# Patient Record
Sex: MALE | Race: WHITE | Employment: UNEMPLOYED | URBAN - NONMETROPOLITAN AREA
[De-identification: names, ages, dates, MRNs, and addresses within clinical notes are randomized per-mention and may not be internally consistent; named-entity substitution may affect disease eponyms.]

---

## 2017-12-15 LAB — PAIN MANAGEMENT DRUG PANEL INTERP, URINE: NORMAL

## 2021-02-22 LAB — AEROBIC CULTURE: ABNORMAL

## 2021-02-25 ENCOUNTER — HOSPITAL ENCOUNTER (INPATIENT)
Age: 56
LOS: 3 days | Discharge: HOME | DRG: 380 | End: 2021-02-28
Attending: INTERNAL MEDICINE | Admitting: INTERNAL MEDICINE
Payer: MEDICAID

## 2021-02-25 ENCOUNTER — APPOINTMENT (OUTPATIENT)
Dept: GENERAL RADIOLOGY | Age: 56
DRG: 380 | End: 2021-02-25
Payer: MEDICAID

## 2021-02-25 ENCOUNTER — APPOINTMENT (OUTPATIENT)
Dept: CT IMAGING | Age: 56
DRG: 380 | End: 2021-02-25
Payer: MEDICAID

## 2021-02-25 DIAGNOSIS — B35.0 TINEA CAPITIS: ICD-10-CM

## 2021-02-25 DIAGNOSIS — R41.82 ALTERED MENTAL STATUS, UNSPECIFIED ALTERED MENTAL STATUS TYPE: Primary | ICD-10-CM

## 2021-02-25 DIAGNOSIS — E11.621 DIABETIC ULCER OF LEFT MIDFOOT ASSOCIATED WITH TYPE 2 DIABETES MELLITUS, WITH FAT LAYER EXPOSED (HCC): ICD-10-CM

## 2021-02-25 DIAGNOSIS — M54.50 CHRONIC LOW BACK PAIN, UNSPECIFIED BACK PAIN LATERALITY, UNSPECIFIED WHETHER SCIATICA PRESENT: ICD-10-CM

## 2021-02-25 DIAGNOSIS — G89.29 CHRONIC LOW BACK PAIN, UNSPECIFIED BACK PAIN LATERALITY, UNSPECIFIED WHETHER SCIATICA PRESENT: ICD-10-CM

## 2021-02-25 DIAGNOSIS — L97.422 DIABETIC ULCER OF LEFT MIDFOOT ASSOCIATED WITH TYPE 2 DIABETES MELLITUS, WITH FAT LAYER EXPOSED (HCC): ICD-10-CM

## 2021-02-25 PROBLEM — G93.40 ACUTE ENCEPHALOPATHY: Status: ACTIVE | Noted: 2021-02-25

## 2021-02-25 LAB
ACETAMINOPHEN LEVEL: < 5 UG/ML (ref 0–20)
ALBUMIN SERPL-MCNC: 4.2 G/DL (ref 3.5–5.1)
ALBUMIN SERPL-MCNC: 4.4 G/DL (ref 3.5–5.1)
ALLEN TEST: POSITIVE
ALP BLD-CCNC: 87 U/L (ref 38–126)
ALP BLD-CCNC: 88 U/L (ref 38–126)
ALT SERPL-CCNC: 16 U/L (ref 11–66)
ALT SERPL-CCNC: 17 U/L (ref 11–66)
AMMONIA: 20 UMOL/L (ref 11–60)
AMMONIA: 26 UMOL/L (ref 11–60)
AMPHETAMINE+METHAMPHETAMINE URINE SCREEN: NEGATIVE
ANION GAP SERPL CALCULATED.3IONS-SCNC: 11 MEQ/L (ref 8–16)
ANION GAP SERPL CALCULATED.3IONS-SCNC: 16 MEQ/L (ref 8–16)
APTT: 30.3 SECONDS (ref 22–38)
AST SERPL-CCNC: 20 U/L (ref 5–40)
AST SERPL-CCNC: 20 U/L (ref 5–40)
AVERAGE GLUCOSE: 219 MG/DL (ref 70–126)
BARBITURATE QUANTITATIVE URINE: NEGATIVE
BASE EXCESS (CALCULATED): -2 MMOL/L (ref -2.5–2.5)
BASOPHILS # BLD: 0.4 %
BASOPHILS ABSOLUTE: 0 THOU/MM3 (ref 0–0.1)
BENZODIAZEPINE QUANTITATIVE URINE: NEGATIVE
BILIRUB SERPL-MCNC: 0.4 MG/DL (ref 0.3–1.2)
BILIRUB SERPL-MCNC: 0.5 MG/DL (ref 0.3–1.2)
BILIRUBIN DIRECT: < 0.2 MG/DL (ref 0–0.3)
BILIRUBIN URINE: NEGATIVE
BLOOD, URINE: NEGATIVE
BUN BLDV-MCNC: 14 MG/DL (ref 7–22)
BUN BLDV-MCNC: 18 MG/DL (ref 7–22)
CALCIUM SERPL-MCNC: 9.7 MG/DL (ref 8.5–10.5)
CALCIUM SERPL-MCNC: 9.8 MG/DL (ref 8.5–10.5)
CANNABINOID QUANTITATIVE URINE: NEGATIVE
CHARACTER, URINE: CLEAR
CHLORIDE BLD-SCNC: 103 MEQ/L (ref 98–111)
CHLORIDE BLD-SCNC: 107 MEQ/L (ref 98–111)
CO2: 21 MEQ/L (ref 23–33)
CO2: 25 MEQ/L (ref 23–33)
COCAINE METABOLITE QUANTITATIVE URINE: NEGATIVE
COLLECTED BY:: ABNORMAL
COLOR: YELLOW
CREAT SERPL-MCNC: 0.9 MG/DL (ref 0.4–1.2)
CREAT SERPL-MCNC: 1.1 MG/DL (ref 0.4–1.2)
DEVICE: ABNORMAL
EKG ATRIAL RATE: 84 BPM
EKG P AXIS: 62 DEGREES
EKG P-R INTERVAL: 206 MS
EKG Q-T INTERVAL: 382 MS
EKG QRS DURATION: 114 MS
EKG QTC CALCULATION (BAZETT): 451 MS
EKG R AXIS: -39 DEGREES
EKG T AXIS: 23 DEGREES
EKG VENTRICULAR RATE: 84 BPM
EOSINOPHIL # BLD: 0.8 %
EOSINOPHILS ABSOLUTE: 0.1 THOU/MM3 (ref 0–0.4)
ERYTHROCYTE [DISTWIDTH] IN BLOOD BY AUTOMATED COUNT: 13.6 % (ref 11.5–14.5)
ERYTHROCYTE [DISTWIDTH] IN BLOOD BY AUTOMATED COUNT: 13.6 % (ref 11.5–14.5)
ERYTHROCYTE [DISTWIDTH] IN BLOOD BY AUTOMATED COUNT: 43.7 FL (ref 35–45)
ERYTHROCYTE [DISTWIDTH] IN BLOOD BY AUTOMATED COUNT: 45.5 FL (ref 35–45)
ETHYL ALCOHOL, SERUM: < 0.01 %
GFR SERPL CREATININE-BSD FRML MDRD: 70 ML/MIN/1.73M2
GFR SERPL CREATININE-BSD FRML MDRD: 88 ML/MIN/1.73M2
GLUCOSE BLD-MCNC: 117 MG/DL (ref 70–108)
GLUCOSE BLD-MCNC: 131 MG/DL (ref 70–108)
GLUCOSE BLD-MCNC: 131 MG/DL (ref 70–108)
GLUCOSE BLD-MCNC: 178 MG/DL (ref 70–108)
GLUCOSE BLD-MCNC: 206 MG/DL (ref 70–108)
GLUCOSE URINE: NEGATIVE MG/DL
HBA1C MFR BLD: 9.3 % (ref 4.4–6.4)
HCO3: 24 MMOL/L (ref 23–28)
HCT VFR BLD CALC: 43.6 % (ref 42–52)
HCT VFR BLD CALC: 46 % (ref 42–52)
HEMOGLOBIN: 14.7 GM/DL (ref 14–18)
HEMOGLOBIN: 15.1 GM/DL (ref 14–18)
IMMATURE GRANS (ABS): 0.02 THOU/MM3 (ref 0–0.07)
IMMATURE GRANULOCYTES: 0.2 %
INR BLD: 0.97 (ref 0.85–1.13)
INR BLD: 0.99 (ref 0.85–1.13)
KETONES, URINE: ABNORMAL
LACTIC ACID, SEPSIS: 1.4 MMOL/L (ref 0.5–1.9)
LACTIC ACID, SEPSIS: 1.4 MMOL/L (ref 0.5–1.9)
LEUKOCYTE ESTERASE, URINE: NEGATIVE
LYMPHOCYTES # BLD: 44.6 %
LYMPHOCYTES ABSOLUTE: 4.7 THOU/MM3 (ref 1–4.8)
MCH RBC QN AUTO: 29.6 PG (ref 26–33)
MCH RBC QN AUTO: 29.7 PG (ref 26–33)
MCHC RBC AUTO-ENTMCNC: 32.8 GM/DL (ref 32.2–35.5)
MCHC RBC AUTO-ENTMCNC: 33.7 GM/DL (ref 32.2–35.5)
MCV RBC AUTO: 87.7 FL (ref 80–94)
MCV RBC AUTO: 90.4 FL (ref 80–94)
MONOCYTES # BLD: 7.1 %
MONOCYTES ABSOLUTE: 0.8 THOU/MM3 (ref 0.4–1.3)
NITRITE, URINE: NEGATIVE
NUCLEATED RED BLOOD CELLS: 0 /100 WBC
O2 SATURATION: 90 %
OPIATES, URINE: NEGATIVE
OSMOLALITY CALCULATION: 283.1 MOSMOL/KG (ref 275–300)
OSMOLALITY: 302 MOSMOL/KG (ref 275–295)
OXYCODONE: NEGATIVE
PCO2: 44 MMHG (ref 35–45)
PH BLOOD GAS: 7.35 (ref 7.35–7.45)
PH UA: 5 (ref 5–9)
PHENCYCLIDINE QUANTITATIVE URINE: NEGATIVE
PLATELET # BLD: 234 THOU/MM3 (ref 130–400)
PLATELET # BLD: 262 THOU/MM3 (ref 130–400)
PMV BLD AUTO: 9.6 FL (ref 9.4–12.4)
PMV BLD AUTO: 9.7 FL (ref 9.4–12.4)
PO2: 61 MMHG (ref 71–104)
POTASSIUM REFLEX MAGNESIUM: 3.8 MEQ/L (ref 3.5–5.2)
POTASSIUM SERPL-SCNC: 3.9 MEQ/L (ref 3.5–5.2)
PROTEIN UA: NEGATIVE
RBC # BLD: 4.97 MILL/MM3 (ref 4.7–6.1)
RBC # BLD: 5.09 MILL/MM3 (ref 4.7–6.1)
SALICYLATE, SERUM: < 0.3 MG/DL (ref 2–10)
SEG NEUTROPHILS: 46.9 %
SEGMENTED NEUTROPHILS ABSOLUTE COUNT: 5 THOU/MM3 (ref 1.8–7.7)
SODIUM BLD-SCNC: 140 MEQ/L (ref 135–145)
SODIUM BLD-SCNC: 143 MEQ/L (ref 135–145)
SOURCE, BLOOD GAS: ABNORMAL
SPECIFIC GRAVITY, URINE: 1.01 (ref 1–1.03)
T4 FREE: 1.03 NG/DL (ref 0.93–1.76)
TOTAL PROTEIN: 7.5 G/DL (ref 6.1–8)
TOTAL PROTEIN: 7.8 G/DL (ref 6.1–8)
TROPONIN T: < 0.01 NG/ML
TSH SERPL DL<=0.05 MIU/L-ACNC: 0.53 UIU/ML (ref 0.4–4.2)
UROBILINOGEN, URINE: 0.2 EU/DL (ref 0–1)
WBC # BLD: 10.6 THOU/MM3 (ref 4.8–10.8)
WBC # BLD: 11.8 THOU/MM3 (ref 4.8–10.8)

## 2021-02-25 PROCEDURE — 99223 1ST HOSP IP/OBS HIGH 75: CPT | Performed by: INTERNAL MEDICINE

## 2021-02-25 PROCEDURE — 1200000003 HC TELEMETRY R&B

## 2021-02-25 PROCEDURE — 2580000003 HC RX 258: Performed by: NURSE PRACTITIONER

## 2021-02-25 PROCEDURE — 36415 COLL VENOUS BLD VENIPUNCTURE: CPT

## 2021-02-25 PROCEDURE — 85610 PROTHROMBIN TIME: CPT

## 2021-02-25 PROCEDURE — 71045 X-RAY EXAM CHEST 1 VIEW: CPT

## 2021-02-25 PROCEDURE — 36600 WITHDRAWAL OF ARTERIAL BLOOD: CPT

## 2021-02-25 PROCEDURE — 95819 EEG AWAKE AND ASLEEP: CPT

## 2021-02-25 PROCEDURE — 80143 DRUG ASSAY ACETAMINOPHEN: CPT

## 2021-02-25 PROCEDURE — 84439 ASSAY OF FREE THYROXINE: CPT

## 2021-02-25 PROCEDURE — 85730 THROMBOPLASTIN TIME PARTIAL: CPT

## 2021-02-25 PROCEDURE — 70450 CT HEAD/BRAIN W/O DYE: CPT

## 2021-02-25 PROCEDURE — 94760 N-INVAS EAR/PLS OXIMETRY 1: CPT

## 2021-02-25 PROCEDURE — 82077 ASSAY SPEC XCP UR&BREATH IA: CPT

## 2021-02-25 PROCEDURE — 85027 COMPLETE CBC AUTOMATED: CPT

## 2021-02-25 PROCEDURE — 80053 COMPREHEN METABOLIC PANEL: CPT

## 2021-02-25 PROCEDURE — 93010 ELECTROCARDIOGRAM REPORT: CPT | Performed by: NUCLEAR MEDICINE

## 2021-02-25 PROCEDURE — 6370000000 HC RX 637 (ALT 250 FOR IP): Performed by: INTERNAL MEDICINE

## 2021-02-25 PROCEDURE — 99254 IP/OBS CNSLTJ NEW/EST MOD 60: CPT | Performed by: PSYCHIATRY & NEUROLOGY

## 2021-02-25 PROCEDURE — 6360000002 HC RX W HCPCS: Performed by: INTERNAL MEDICINE

## 2021-02-25 PROCEDURE — 82803 BLOOD GASES ANY COMBINATION: CPT

## 2021-02-25 PROCEDURE — 6360000002 HC RX W HCPCS

## 2021-02-25 PROCEDURE — 80179 DRUG ASSAY SALICYLATE: CPT

## 2021-02-25 PROCEDURE — 96374 THER/PROPH/DIAG INJ IV PUSH: CPT

## 2021-02-25 PROCEDURE — 83605 ASSAY OF LACTIC ACID: CPT

## 2021-02-25 PROCEDURE — 80307 DRUG TEST PRSMV CHEM ANLYZR: CPT

## 2021-02-25 PROCEDURE — 84443 ASSAY THYROID STIM HORMONE: CPT

## 2021-02-25 PROCEDURE — 6360000002 HC RX W HCPCS: Performed by: NURSE PRACTITIONER

## 2021-02-25 PROCEDURE — 84484 ASSAY OF TROPONIN QUANT: CPT

## 2021-02-25 PROCEDURE — 96375 TX/PRO/DX INJ NEW DRUG ADDON: CPT

## 2021-02-25 PROCEDURE — 85025 COMPLETE CBC W/AUTO DIFF WBC: CPT

## 2021-02-25 PROCEDURE — 96376 TX/PRO/DX INJ SAME DRUG ADON: CPT

## 2021-02-25 PROCEDURE — 93005 ELECTROCARDIOGRAM TRACING: CPT | Performed by: NURSE PRACTITIONER

## 2021-02-25 PROCEDURE — 96361 HYDRATE IV INFUSION ADD-ON: CPT

## 2021-02-25 PROCEDURE — 6370000000 HC RX 637 (ALT 250 FOR IP): Performed by: PSYCHIATRY & NEUROLOGY

## 2021-02-25 PROCEDURE — 83930 ASSAY OF BLOOD OSMOLALITY: CPT

## 2021-02-25 PROCEDURE — 82948 REAGENT STRIP/BLOOD GLUCOSE: CPT

## 2021-02-25 PROCEDURE — 82140 ASSAY OF AMMONIA: CPT

## 2021-02-25 PROCEDURE — 81003 URINALYSIS AUTO W/O SCOPE: CPT

## 2021-02-25 PROCEDURE — 82248 BILIRUBIN DIRECT: CPT

## 2021-02-25 PROCEDURE — 83036 HEMOGLOBIN GLYCOSYLATED A1C: CPT

## 2021-02-25 PROCEDURE — 99285 EMERGENCY DEPT VISIT HI MDM: CPT

## 2021-02-25 PROCEDURE — 95816 EEG AWAKE AND DROWSY: CPT | Performed by: PSYCHIATRY & NEUROLOGY

## 2021-02-25 RX ORDER — SODIUM CHLORIDE 0.9 % (FLUSH) 0.9 %
10 SYRINGE (ML) INJECTION PRN
Status: DISCONTINUED | OUTPATIENT
Start: 2021-02-25 | End: 2021-02-28 | Stop reason: HOSPADM

## 2021-02-25 RX ORDER — PREGABALIN 50 MG/1
100 CAPSULE ORAL 4 TIMES DAILY
Status: DISCONTINUED | OUTPATIENT
Start: 2021-02-25 | End: 2021-02-25

## 2021-02-25 RX ORDER — PREGABALIN 75 MG/1
75 CAPSULE ORAL 3 TIMES DAILY
Status: DISCONTINUED | OUTPATIENT
Start: 2021-02-25 | End: 2021-02-28 | Stop reason: HOSPADM

## 2021-02-25 RX ORDER — NICOTINE POLACRILEX 4 MG
15 LOZENGE BUCCAL PRN
Status: DISCONTINUED | OUTPATIENT
Start: 2021-02-25 | End: 2021-02-28 | Stop reason: HOSPADM

## 2021-02-25 RX ORDER — BUPRENORPHINE HYDROCHLORIDE 8 MG/1
4 TABLET SUBLINGUAL DAILY
Status: CANCELLED | OUTPATIENT
Start: 2021-02-25

## 2021-02-25 RX ORDER — DULOXETIN HYDROCHLORIDE 60 MG/1
60 CAPSULE, DELAYED RELEASE ORAL DAILY
Status: DISCONTINUED | OUTPATIENT
Start: 2021-02-25 | End: 2021-02-28 | Stop reason: HOSPADM

## 2021-02-25 RX ORDER — DULOXETIN HYDROCHLORIDE 60 MG/1
60 CAPSULE, DELAYED RELEASE ORAL DAILY
COMMUNITY
End: 2021-04-08

## 2021-02-25 RX ORDER — ACETAMINOPHEN 325 MG/1
650 TABLET ORAL EVERY 6 HOURS PRN
Status: DISCONTINUED | OUTPATIENT
Start: 2021-02-25 | End: 2021-02-28 | Stop reason: HOSPADM

## 2021-02-25 RX ORDER — PROMETHAZINE HYDROCHLORIDE 25 MG/1
12.5 TABLET ORAL EVERY 6 HOURS PRN
Status: DISCONTINUED | OUTPATIENT
Start: 2021-02-25 | End: 2021-02-28 | Stop reason: HOSPADM

## 2021-02-25 RX ORDER — NALOXONE HYDROCHLORIDE 1 MG/ML
2 INJECTION INTRAMUSCULAR; INTRAVENOUS; SUBCUTANEOUS ONCE
Status: COMPLETED | OUTPATIENT
Start: 2021-02-25 | End: 2021-02-25

## 2021-02-25 RX ORDER — ACETAMINOPHEN 650 MG/1
650 SUPPOSITORY RECTAL EVERY 6 HOURS PRN
Status: DISCONTINUED | OUTPATIENT
Start: 2021-02-25 | End: 2021-02-28 | Stop reason: HOSPADM

## 2021-02-25 RX ORDER — ONDANSETRON 2 MG/ML
4 INJECTION INTRAMUSCULAR; INTRAVENOUS EVERY 6 HOURS PRN
Status: DISCONTINUED | OUTPATIENT
Start: 2021-02-25 | End: 2021-02-28 | Stop reason: HOSPADM

## 2021-02-25 RX ORDER — GLIMEPIRIDE 4 MG/1
6 TABLET ORAL EVERY MORNING
COMMUNITY

## 2021-02-25 RX ORDER — POLYETHYLENE GLYCOL 3350 17 G/17G
17 POWDER, FOR SOLUTION ORAL DAILY PRN
Status: DISCONTINUED | OUTPATIENT
Start: 2021-02-25 | End: 2021-02-28 | Stop reason: HOSPADM

## 2021-02-25 RX ORDER — LORAZEPAM 2 MG/ML
1 INJECTION INTRAMUSCULAR ONCE
Status: COMPLETED | OUTPATIENT
Start: 2021-02-25 | End: 2021-02-25

## 2021-02-25 RX ORDER — PREGABALIN 100 MG/1
100 CAPSULE ORAL 3 TIMES DAILY
Status: ON HOLD | COMMUNITY
End: 2021-02-28 | Stop reason: SDUPTHER

## 2021-02-25 RX ORDER — 0.9 % SODIUM CHLORIDE 0.9 %
1000 INTRAVENOUS SOLUTION INTRAVENOUS ONCE
Status: COMPLETED | OUTPATIENT
Start: 2021-02-25 | End: 2021-02-25

## 2021-02-25 RX ORDER — LISINOPRIL 20 MG/1
20 TABLET ORAL 2 TIMES DAILY
Status: ON HOLD | COMMUNITY
End: 2021-02-28 | Stop reason: HOSPADM

## 2021-02-25 RX ORDER — DEXTROSE MONOHYDRATE 25 G/50ML
12.5 INJECTION, SOLUTION INTRAVENOUS PRN
Status: DISCONTINUED | OUTPATIENT
Start: 2021-02-25 | End: 2021-02-28 | Stop reason: HOSPADM

## 2021-02-25 RX ORDER — SODIUM CHLORIDE 0.9 % (FLUSH) 0.9 %
10 SYRINGE (ML) INJECTION EVERY 12 HOURS SCHEDULED
Status: DISCONTINUED | OUTPATIENT
Start: 2021-02-25 | End: 2021-02-28 | Stop reason: HOSPADM

## 2021-02-25 RX ORDER — LORAZEPAM 2 MG/ML
INJECTION INTRAMUSCULAR
Status: COMPLETED
Start: 2021-02-25 | End: 2021-02-25

## 2021-02-25 RX ORDER — FLUOXETINE HYDROCHLORIDE 20 MG/1
20 CAPSULE ORAL DAILY
Status: DISCONTINUED | OUTPATIENT
Start: 2021-02-25 | End: 2021-02-25

## 2021-02-25 RX ORDER — DULOXETIN HYDROCHLORIDE 60 MG/1
CAPSULE, DELAYED RELEASE ORAL
Status: ON HOLD | COMMUNITY
Start: 2021-02-12 | End: 2021-02-25

## 2021-02-25 RX ORDER — ASPIRIN 81 MG/1
81 TABLET, CHEWABLE ORAL DAILY
COMMUNITY

## 2021-02-25 RX ORDER — BUPRENORPHINE 10 UG/H
1 PATCH TRANSDERMAL WEEKLY
COMMUNITY
End: 2021-04-08

## 2021-02-25 RX ORDER — DEXTROSE MONOHYDRATE 50 MG/ML
100 INJECTION, SOLUTION INTRAVENOUS PRN
Status: DISCONTINUED | OUTPATIENT
Start: 2021-02-25 | End: 2021-02-28 | Stop reason: HOSPADM

## 2021-02-25 RX ADMIN — PREGABALIN 75 MG: 75 CAPSULE ORAL at 20:29

## 2021-02-25 RX ADMIN — DULOXETINE HYDROCHLORIDE 60 MG: 60 CAPSULE, DELAYED RELEASE ORAL at 20:28

## 2021-02-25 RX ADMIN — NALOXONE HYDROCHLORIDE 2 MG: 1 INJECTION PARENTERAL at 01:01

## 2021-02-25 RX ADMIN — INSULIN LISPRO 4 UNITS: 100 INJECTION, SOLUTION INTRAVENOUS; SUBCUTANEOUS at 17:23

## 2021-02-25 RX ADMIN — LORAZEPAM 1 MG: 2 INJECTION INTRAMUSCULAR; INTRAVENOUS at 05:51

## 2021-02-25 RX ADMIN — LORAZEPAM 1 MG: 2 INJECTION INTRAMUSCULAR at 02:53

## 2021-02-25 RX ADMIN — LORAZEPAM 1 MG: 2 INJECTION INTRAMUSCULAR; INTRAVENOUS at 02:53

## 2021-02-25 RX ADMIN — PREGABALIN 75 MG: 75 CAPSULE ORAL at 15:09

## 2021-02-25 RX ADMIN — ENOXAPARIN SODIUM 40 MG: 40 INJECTION SUBCUTANEOUS at 11:30

## 2021-02-25 RX ADMIN — SODIUM CHLORIDE 1000 ML: 9 INJECTION, SOLUTION INTRAVENOUS at 00:59

## 2021-02-25 NOTE — PROGRESS NOTES
66 [facility] Laboratory Technician Worksheet      EEG Date: 2021    Name: Laura Chandra   : 1965   Age: 48 y.o. SEX: male    ROOM: [facility] MRN: [id number]           CSN: [id number]      Ordering Provider: Bessie Ellington  EEG Number: [id number] Time of Test:  14:42    Hand: Right   Sedation: no    H.V. Done: No  Photic: No    Sleep: Yes  Drowsy: Yes   Sleep Deprived: No    Seizures observed: no    Mentality: alert      Clinical History:AMS, CONFUSION HX OF SUBSTANCE ABUSE; URINE TOXICOLOGY WAS NEG. HX OF SEIZURES   CT HEAD:  Impression   Impression:   1. No CT evidence of acute intracranial pathology.        Past Medical History:       Diagnosis Date    Seizures ([address]        Scheduled Meds:   sodium chloride flush  10 mL Intravenous 2 times per day    insulin lispro  0-12 Units Subcutaneous TID WC    insulin lispro  0-6 Units Subcutaneous Nightly    enoxaparin  40 mg Subcutaneous Daily    pregabalin  75 mg Oral TID    DULoxetine  60 mg Oral Daily     Continuous Infusions:   dextrose       PRN Meds:.sodium chloride flush, promethazine **OR** ondansetron, polyethylene glycol, acetaminophen **OR** acetaminophen, glucose, dextrose, glucagon (rDNA), dextrose    Technician: Adrian Galo 2021

## 2021-02-25 NOTE — ED NOTES
Pt resting on cot with lights off, eyes closed and unlabored respirations.       Rex Leach RN  02/25/21 0878

## 2021-02-25 NOTE — ED NOTES
Tele sitter placed in room at this time. Pt found at foot of bed standing again, bed alarm did not go off.       Latonia Leach RN  02/25/21 9185

## 2021-02-25 NOTE — ED NOTES
ED nurse-to-nurse bedside report    Chief Complaint   Patient presents with    Altered Mental Status      LOC: alert to only name  Vital signs   Vitals:    02/25/21 0352 02/25/21 0437 02/25/21 0538 02/25/21 0623   BP: (!) 152/91 (!) 160/96 (!) 156/84 (!) 128/104   Pulse: 74 73 84 102   Resp: 16 13 15 18   Temp:       TempSrc:       SpO2: 96% 97% 100% 97%      Pain:    Pain Interventions: ativan  Pain Goal: n/a  Oxygen: No    Current needs required room air   Telemetry: Yes  LDAs:   Peripheral IV 02/25/21 Right Antecubital (Active)   Site Assessment Clean;Dry; Intact 02/25/21 0450   Line Status Normal saline locked 02/25/21 0450   Dressing Status Dry;Clean; Intact 02/25/21 0450     Continuous Infusions:   Mobility: Requires assistance * 2  [place] Fall Risk Score: No flowsheet data found.   Fall Interventions: tele sitter, x 2 side rails  Report given to: Janelle Almodovar RN  02/25/21 2520

## 2021-02-25 NOTE — ED PROVIDER NOTES
[facility] Emergency Department    CHIEF COMPLAINT       Chief Complaint   Patient presents with    Altered Mental Status       Nurses Notes reviewed and I agree except as noted in the HPI. HISTORY OF PRESENT ILLNESS    Marti Glaser marcial 48 y.o. male who presents to the ED for evaluation of altered mental status. Patient is a resident of a sober living house. Police and EMS were called to the [facility] for an unresponsive male. Patient was intermittently unresponsive. When he was responsive he would open his eyes very wide and answer nonsensically. Airway was intact. EMS provided the HPI    REVIEW OF SYSTEMS     Review of Systems   Unable to perform ROS: Mental status change        All other systems negative except as noted. PAST MEDICAL HISTORY   History reviewed. No pertinent past medical history. SURGICALHISTORY      has no past surgical history on file. CURRENT MEDICATIONS       Previous Medications    No medications on file       ALLERGIES     has No Known Allergies. FAMILY HISTORY     has no family status information on file. family history is not on file.     SOCIAL HISTORY       Social History     Socioeconomic History    Marital status: Single     Spouse name: Not on file    Number of children: Not on file    Years of education: Not on file    Highest education level: Not on file   Occupational History    Not on file   Social Needs    Financial resource strain: Not on file    Food insecurity     Worry: Not on file     Inability: Not on file    Transportation needs     Medical: Not on file     Non-medical: Not on file   Tobacco Use    Smoking status: Unknown If Ever Smoked   Substance and Sexual Activity    Alcohol use: Not on file    Drug use: Not on file    Sexual activity: Not on file   Lifestyle    Physical activity     Days per week: Not on file     Minutes per session: Not on file    Stress: Not on file   Relationships    Social connections     Talks on phone: Not on file     Gets together: Not on file     Attends Muslim service: Not on file     Active member of club or organization: Not on file     Attends meetings of clubs or organizations: Not on file     Relationship status: Not on file    Intimate partner violence     Fear of current or ex partner: Not on file     Emotionally abused: Not on file     Physically abused: Not on file     Forced sexual activity: Not on file   Other Topics Concern    Not on file   Social History Narrative    Not on file       PHYSICAL EXAM     INITIAL VITALS:  axillary temperature is 96.6 °F (35.9 °C). His blood pressure is 156/84 (abnormal) and his pulse is 84. His respiration is 15 and oxygen saturation is 100%. Physical Exam  Vitals signs and nursing note reviewed. Constitutional:       General: He is not in acute distress. Appearance: He is well-developed and well-groomed. He is diaphoretic. He is not ill-appearing. HENT:      Head: Normocephalic and atraumatic. Mouth/Throat:      Mouth: Mucous membranes are moist.      Pharynx: Oropharynx is clear. Eyes:      Extraocular Movements: Extraocular movements intact. Right eye: Normal extraocular motion and no nystagmus. Left eye: Normal extraocular motion and no nystagmus. Pupils:      Right eye: Pupil is round and reactive. Left eye: Pupil is round and reactive. Comments: PUPILS ARE 5 AND MINIMALLY RESPONSIVE   Neck:      Musculoskeletal: Normal range of motion and neck supple. Cardiovascular:      Rate and Rhythm: Normal rate and regular rhythm. Heart sounds: Normal heart sounds. Pulmonary:      Effort: Pulmonary effort is normal.      Breath sounds: Normal breath sounds. Abdominal:      General: Bowel sounds are normal.      Palpations: Abdomen is soft. Genitourinary:     Comments: Patient was incontinent of urine  Musculoskeletal: Normal range of motion. Skin:     General: Skin is warm.       Capillary Refill: Capillary refill takes less than 2 seconds. Findings: Rash present. Neurological:      General: No focal deficit present. Mental Status: He is disoriented. Comments: Unable to perform neuro exam.   Psychiatric:      Comments: Patient is sleeping but when he does arouse he becomes agitated very easily. DIFFERENTIAL DIAGNOSIS:   Intoxication, overdose, CVA, seizure    DIAGNOSTIC RESULTS     EKG: All EKG's are interpreted by the Emergency Department Physician who eithersigns or Co-signs this chart in the absence of a cardiologist.     EKG 12 Lead (Preliminary result)   Component (Lab Inquiry)  Collection Time Result Time Ventricular Rate Atrial Rate P-R Interval QRS Duration Q-T Interval QTc Calculation (Bazett) P Axis R Axis T Axis   02/25/21 00:47:38 02/25/21 02:57:57 84 84 206 114 382 451 62 -39 23         Preliminary result                Narrative:    Normal sinus rhythm   Left axis deviation   Incomplete right bundle branch block   Abnormal ECG   No previous ECGs available                        RADIOLOGY: non-plainfilm images(s) such as CT, Ultrasound and MRI are read by the radiologist.  Plain radiographic images are visualized and preliminarily interpreted by the emergency physician unless otherwise stated below. XR CHEST PORTABLE   Final Result   Impression:   1. No acute cardiopulmonary disease. 2.  Poor inspiratory effort accentuating the heart size and pulmonary    vessels. This document has been electronically signed by: Caron Aquino MD on    02/25/2021 01:25 AM      CT HEAD WO CONTRAST (CODE STROKE)   Final Result   Impression:   1. No CT evidence of acute intracranial pathology. This document has been electronically signed by: Caron Aquino MD on    02/25/2021 12:58 AM      All CTs at this facility use dose modulation techniques and iterative    reconstructions, and/or weight-based dosing   when appropriate to reduce radiation to a low as reasonably achievable. LABS:   Labs Reviewed   BASIC METABOLIC PANEL - Abnormal; Notable for the following components:       Result Value    CO2 21 (*)     Glucose 131 (*)     All other components within normal limits   SALICYLATE LEVEL - Abnormal; Notable for the following components:    Salicylate, Serum < 0.3 (*)     All other components within normal limits   URINE RT REFLEX TO CULTURE - Abnormal; Notable for the following components:    Ketones, Urine TRACE (*)     All other components within normal limits   BLOOD GAS, ARTERIAL - Abnormal; Notable for the following components:    PO2 61 (*)     All other components within normal limits   GLOMERULAR FILTRATION RATE, ESTIMATED - Abnormal; Notable for the following components:    Est, Glom Filt Rate 70 (*)     All other components within normal limits   POCT GLUCOSE - Abnormal; Notable for the following components:    POC Glucose 131 (*)     All other components within normal limits   CBC WITH AUTO DIFFERENTIAL   ETHANOL   HEPATIC FUNCTION PANEL   ACETAMINOPHEN LEVEL   URINE DRUG SCREEN   TSH WITHOUT REFLEX   TROPONIN   PROTIME-INR   APTT   AMMONIA   ANION GAP   OSMOLALITY       EMERGENCY DEPARTMENT COURSE:   Vitals:    Vitals:    02/25/21 0307 02/25/21 0352 02/25/21 0437 02/25/21 0538   BP: (!) 167/92 (!) 152/91 (!) 160/96 (!) 156/84   Pulse: 85 74 73 84   Resp: 14 16 13 15   Temp:       TempSrc:       SpO2: 98% 96% 97% 100%          MDM             NIH Stroke Scale  Interval: Baseline  Level of Consciousness (1a. ): Not alert, requires repeated stimulation to attend  LOC Questions (1b. ): (ROSEMARY)  LOC Commands (1c. ): (ROSEMARY)  Best Gaze (2. ): (ROSEMARY)  Visual (3. ): (ROSEMARY)  Facial Palsy (4. ): (ROSEMARY)  Motor Arm, Left (5a. ): (ROSEMARY)  Motor Arm, Right (5b. ): (ROSEMARY)  Motor Leg, Left (6a. ): (ROSEMARY)  Motor Leg, Right (6b. ): (ROSEMARY)  Limb Ataxia (7. ): (ROSEMARY)  Sensory (8. ): (ROSEMARY)  Best Language (9. ): No aphasia  Dysarthria (10. ): (ROSEMARY)  Extinction and Inattention (11): (ROSEMARY) MDM    Patient seen and evaluated in the emergency room for altered mentation. Appropriate labs and imaging are ordered and reviewed. Patient is given 2 mg of Narcan without a whole lot of affect. He is hydrated with IV fluids. He is given 2 doses of Ativan secondary to his agitation. He has episodes of lucidity and then becomes confused. Sometimes he answers questions appropriately but then he will answer a couple of questions with nonsensical answers. CT of the head was negative for acute pathology. Urine is negative for infection. Overall labs are reassuring. EKG is unremarkable. At the end of my shift I am considering 2 diagnoses. One would be intoxication/overdose with a synthetic substance as his tox screen is negative. His physical exam is consistent with a toxidrome. The other consideration would be that of a seizure. He could be having an abnormal postictal phase. .  But this is not consistent with normal postictal behavior of a patient. Reviewed the case with my attending, Dr. Janet Brumfield. The decision was made to monitor the patient in the emergency room for More hours to see if he becomes more lucid. Care will be transferred to my oncoming provider Mana Whitman [place]  Medications   LORazepam (ATIVAN) injection 1 mg (has no administration in time range)   0.9 % sodium chloride bolus (0 mLs Intravenous Stopped 2/25/21 0202)   naloxone [facility]) injection 2 mg (2 mg Intravenous Given 2/25/21 0101)   LORazepam (ATIVAN) injection 1 mg (1 mg Intravenous Given 2/25/21 0253)         Patient was seen independently by myself. The patient's final impression and disposition and plan was determined by myself. Strict return precautions and follow up instructions were discussed with the patient prior to discharge, with which the patient agrees. Physical assessment findings, diagnostic testing(s) if applicable, and vital signs reviewed with patient/patient representative. Questions answered. Medications asdirected, including OTC medications for supportive care. Education provided on medications. Differential diagnosis(s) discussed with patient/patient representative. Home care/self care instructions reviewed withpatient/patient representative. Patient is to follow-up with family care provider in 2-3 days if no improvement. Patient is to go to the emergency department if symptoms worsen. Patient/patient representative isaware of care plan, questions answered, verbalizes understanding and is in agreement. CRITICAL CARE:   None    CONSULTS:      PROCEDURES:  None    FINAL IMPRESSION     1. Altered mental status, unspecified altered mental status type          DISPOSITION/PLAN   DISPOSITION Ed Observation 02/25/2021 05:49:01 AM      PATIENT REFERREDTO:  No follow-up provider specified.     DISCHARGE MEDICATIONS:  New Prescriptions    No medications on file       (Please note that portions of this note were completed with a voice recognition program.  Efforts were made to edit the dictations but occasionally words are mis-transcribed.)         AVRIL Muir CNP, APRN - CNP  02/25/21 3878

## 2021-02-25 NOTE — ED NOTES
Bed: 005A  Expected date: 2/25/21  Expected time: 12:27 AM  Means of arrival: [facility]  Comments:     Marisa Camacho RN  02/25/21 6495

## 2021-02-25 NOTE — PROGRESS NOTES
Pharmacy Medication History Note      List of current medications patient is taking is complete. Source of information: patient, outside dispense history     Changes made to medication list:  Medications removed (include reason, ex. therapy complete or physician discontinued):  · none    Medications added/doses adjusted:  · Aspirin 81 mg daily  · Cymbalta 60 mg daily   · Lyrica 100 mg- prescribed QID, pt reports 2-3 times a day   · Humalog scale   · Amaryl 6 mg daily   · Topical pain cream from Dr Charlie Lynch- [id number]-[id number]/BACLO2/DICLO3/GABA6/LIDO2/PRILO2% Grams 1-2 grams topically 3-4 times daily   · Dr Charlie Lynch prescribed Butrans 10mcg/hour patch, confirmed picked up from [facility], pt reports he never started. Other notes (ex. Recent course of antibiotics, Coumadin dosing):  · Patient reports he has not taken any of his homemed medications for the past 3 weeks due to being incarcerated. · Patient reports history of high dose fentanyl patches for 20 years, highest requirement was 250 mcg patch, slowly decreased to 75 mcg/hour patch. He reports being fired from [employer] [facility] [employer] [employer] due to alcohol use. He does not currently have a prescriber for the fentanyl patch, last filled 25 mcg/hour patch 1/3/2021 #10. · Pt reports Lantus- however no recent fill history, therefore did not add to homemed lis  · Pt reports lisinopril 20 mg BID, however last filled at [facility] 3/2020, no active prescription.         Electronically signed by Damián Liz [facility] on 2/25/2021 at 10:02 AM

## 2021-02-25 NOTE — ED NOTES
Winifred Antoine NP at bedside administering 2mg of narcan at this time.       Rosa Leach RN  02/25/21 6053

## 2021-02-25 NOTE — ED NOTES
Pt medicated per MAR. Pt tolerated well. Pt respirations unlabored. Pt very anxious at this time. Pt lights turned off for decrease in stimulation.       Joan GUERRA RN  02/25/21 0300

## 2021-02-25 NOTE — ED NOTES
Pt medicated per MAR. Pt repeating himself and stating that the year is 2020. Pt continuously asked \"what are we giving\" while medicating. Pt respirations unlabored.       Cuong Leach RN  02/25/21 3013

## 2021-02-25 NOTE — ED PROVIDER NOTES
Called to help evaluate the patient. This is a 81-year-old male with a past history of substance abuse coming in today for behavior. Patient is neurologically intact. He is able to answer my questions however he is repetitive in answering my questions. He is able to move all his extremities. Pupils are severely dilated. This looks like a toxidrome to me. Most likely methamphetamines or some sort of synthetic. Patient is awake alert and oriented. He has no alcohol registering. He is slightly tachycardic I feel that this is most likely reflective of what ever ingestion he had. EKG reveals normal sinus rhythm, left axis deviation, incomplete right bundle branch block, ventricular rate of 84 TX interval 206 QRS duration 114 QT interval 382 QTC of 451. Labs Reviewed   BASIC METABOLIC PANEL - Abnormal; Notable for the following components:       Result Value    CO2 21 (*)     Glucose 131 (*)     All other components within normal limits   SALICYLATE LEVEL - Abnormal; Notable for the following components:    Salicylate, Serum < 0.3 (*)     All other components within normal limits   BLOOD GAS, ARTERIAL - Abnormal; Notable for the following components:    PO2 61 (*)     All other components within normal limits   GLOMERULAR FILTRATION RATE, ESTIMATED - Abnormal; Notable for the following components:    Est, Glom Filt Rate 70 (*)     All other components within normal limits   POCT GLUCOSE - Abnormal; Notable for the following components:    POC Glucose 131 (*)     All other components within normal limits   CBC WITH AUTO DIFFERENTIAL   ETHANOL   HEPATIC FUNCTION PANEL   ACETAMINOPHEN LEVEL   TSH WITHOUT REFLEX   TROPONIN   PROTIME-INR   APTT   AMMONIA   ANION GAP   OSMOLALITY   URINE DRUG SCREEN   URINE RT REFLEX TO CULTURE     XR CHEST PORTABLE   Final Result   Impression:   1. No acute cardiopulmonary disease. 2.  Poor inspiratory effort accentuating the heart size and pulmonary    vessels.       This document has been electronically signed by: Osmani Abraham MD on    02/25/2021 01:25 AM      CT HEAD WO CONTRAST (CODE STROKE)   Final Result   Impression:   1. No CT evidence of acute intracranial pathology. This document has been electronically signed by: Osmani Abraham MD on    02/25/2021 12:58 AM      All CTs at this facility use dose modulation techniques and iterative    reconstructions, and/or weight-based dosing   when appropriate to reduce radiation to a low as reasonably achievable. I have seen this patient with Doyle King and agree with her assessment and plan.      Eli Sewell DO  02/25/21 7625

## 2021-02-25 NOTE — ED NOTES
Pt resting on cot with eyes closed and easy respirations. Pt lights off in room to decrease stimulation.       Louise Ta RN  02/25/21 0464

## 2021-02-25 NOTE — ED PROVIDER NOTES
Addendum. Care was assumed at 6 AM.  We did is assumed care from Conchita Horton NP and Dr. Castañeda Avers the patient continued to be altered. We did discuss the case with the hospitalist who will admit the patient.      Flo Jean [place]  02/25/21 9877

## 2021-02-25 NOTE — ED NOTES
Called [facility]K and informed LUIS that the patient is on their way to the unit. Patient transported via bed in a stable condition.      Calista Weaver  02/25/21 6060

## 2021-02-25 NOTE — H&P
hypertension, CKD, seizure disorder, reported polysubstance use disorder. Patient presented to the ER yesterday with confusion. Labs were fairly unremarkable and there is no evidence of any illicit substances in his urine. Patient would answer some questions but then would be nonsensical at other times. He was found down at a sober house. He was given IV fluids and Ativan and then admitted to the hospitalist service. Per my interview the patient is more oriented at this time. He knows he is at a hospital and he knows the month and year. He says that this has happened to him before where he presents to the hospital and is unsure why he is there. When I try to obtain further medical history the patient is a very poor historian. When I prompted him for certain illnesses he is able to elaborate some but is not able to provide much information. He acknowledges that he is a diabetic and takes insulin. He states that he does have a history of seizure disorder but then states he was never on any medication. Chart review in care everywhere demonstrates he has been admitted before for alcohol withdrawal seizures and has been having issues with chronic back pain for which she was on a Subutex patch and was following pain management. He denies any recent alcohol or illicit drug use. He does not recall why he was found down. He states that when he has a seizure is generally a general tonic-clonic seizure. He states his last seizure was a few months ago. He otherwise denies any chest pain, shortness of breath, fevers, chills, diarrhea, nausea, vomiting, headache, vision changes. PMH:  Per HPI  SHX:    Social History     Tobacco Use    Smoking status: Former Smoker     Types: Cigarettes    Smokeless tobacco: Never Used   Substance Use Topics    Alcohol use: Not Currently    Drug use: Not Currently     FHX: History reviewed. No pertinent family history.   Allergies: No Known Allergies  Medications:     dextrose        sodium chloride flush  10 mL Intravenous 2 times per day    insulin lispro  0-12 Units Subcutaneous TID     insulin lispro  0-6 Units Subcutaneous Nightly    FLUoxetine  20 mg Oral Daily    enoxaparin  40 mg Subcutaneous Daily    pregabalin  75 mg Oral TID       Vital Signs:   /86   Pulse 96   Temp 98.6 °F (37 °C) (Axillary)   Resp 18   SpO2 97% Comment: o2 not needed at this time per o2 protocol   No intake or output data in the 24 hours ending 02/25/21 1429     General:   Resting in bed  HEENT:  normocephalic and atraumatic. No scleral icterus. PERR. Dilated pupils bilaterally  Neck: supple. No JVD. No thyromegaly. Lungs: clear to auscultation. No retractions  Cardiac: RRR without murmur. Abdomen: soft. Nontender. Bowel sounds positive. Extremities:  No clubbing, cyanosis, or edema x 4. Vasculature: capillary refill < 3 seconds. Palpable LE pulses bilaterally. Skin:  warm and dry. Psych:  Alert and oriented x3. Affect appropriate. Delayed responses at times. Lymph:  No supraclavicular adenopathy. Neurologic:  No focal deficit. No seizures. Data: (All radiographs, tracings, PFTs, and imaging are personally viewed and interpreted unless otherwise noted).    Recent Results (from the past 24 hour(s))   EKG 12 Lead    Collection Time: 02/25/21 12:47 AM   Result Value Ref Range    Ventricular Rate 84 BPM    Atrial Rate 84 BPM    P-R Interval 206 ms    QRS Duration 114 ms    Q-T Interval 382 ms    QTc Calculation (Bazett) 451 ms    P Axis 62 degrees    R Axis -39 degrees    T Axis 23 degrees   POCT Glucose    Collection Time: 02/25/21 12:49 AM   Result Value Ref Range    POC Glucose 131 (H) 70 - 108 mg/dl   Basic Metabolic Panel    Collection Time: 02/25/21 12:55 AM   Result Value Ref Range    Sodium 140 135 - 145 meq/L    Potassium 3.9 3.5 - 5.2 meq/L    Chloride 103 98 - 111 meq/L    CO2 21 (L) 23 - 33 meq/L    Glucose 131 (H) 70 - 108 mg/dL    BUN 18 7 - 22 mg/dL CREATININE 1.1 0.4 - 1.2 mg/dL    Calcium 9.7 8.5 - 10.5 mg/dL   CBC Auto Differential    Collection Time: 02/25/21 12:55 AM   Result Value Ref Range    WBC 10.6 4.8 - 10.8 thou/mm3    RBC 4.97 4.70 - 6.10 mill/mm3    Hemoglobin 14.7 14.0 - 18.0 gm/dl    Hematocrit 43.6 42.0 - 52.0 %    MCV 87.7 80.0 - 94.0 fL    MCH 29.6 26.0 - 33.0 pg    MCHC 33.7 32.2 - 35.5 gm/dl    RDW-CV 13.6 11.5 - 14.5 %    RDW-SD 43.7 35.0 - 45.0 fL    Platelets 463 904 - 466 thou/mm3    MPV 9.6 9.4 - 12.4 fL    Seg Neutrophils 46.9 %    Lymphocytes 44.6 %    Monocytes 7.1 %    Eosinophils 0.8 %    Basophils 0.4 %    Immature Granulocytes 0.2 %    Segs Absolute 5.0 1.8 - 7.7 thou/mm3    Lymphocytes Absolute 4.7 1.0 - 4.8 thou/mm3    Monocytes Absolute 0.8 0.4 - 1.3 thou/mm3    Eosinophils Absolute 0.1 0.0 - 0.4 thou/mm3    Basophils Absolute 0.0 0.0 - 0.1 thou/mm3    Immature Grans (Abs) 0.02 0.00 - 0.07 thou/mm3    nRBC 0 /100 wbc   Ethanol    Collection Time: 02/25/21 12:55 AM   Result Value Ref Range    ETHYL ALCOHOL, SERUM < 0.01 0.00 %   Hepatic Function Panel    Collection Time: 02/25/21 12:55 AM   Result Value Ref Range    Albumin 4.2 3.5 - 5.1 g/dL    Total Bilirubin 0.4 0.3 - 1.2 mg/dL    Bilirubin, Direct <0.2 0.0 - 0.3 mg/dL    Alkaline Phosphatase 87 38 - 126 U/L    AST 20 5 - 40 U/L    ALT 16 11 - 66 U/L    Total Protein 7.5 6.1 - 8.0 g/dL   Acetaminophen Level    Collection Time: 02/25/21 12:55 AM   Result Value Ref Range    Acetaminophen Level < 5.0 0.0 - 13.9 ug/mL   Salicylate    Collection Time: 02/25/21 12:55 AM   Result Value Ref Range    Salicylate, Serum < 0.3 (L) 2.0 - 10.0 mg/dL   TSH without Reflex    Collection Time: 02/25/21 12:55 AM   Result Value Ref Range    TSH 0.533 0.400 - 4.200 uIU/mL   Troponin    Collection Time: 02/25/21 12:55 AM   Result Value Ref Range    Troponin T < 0.010 ng/ml   Protime-INR    Collection Time: 02/25/21 12:55 AM   Result Value Ref Range    INR 0.97 0.85 - 1.13   APTT    Collection Time: 02/25/21 12:55 AM   Result Value Ref Range    aPTT 30.3 22.0 - 38.0 seconds   Anion Gap    Collection Time: 02/25/21 12:55 AM   Result Value Ref Range    Anion Gap 16.0 8.0 - 16.0 meq/L   Osmolality    Collection Time: 02/25/21 12:55 AM   Result Value Ref Range    Osmolality Calc 283.1 275.0 - 300.0 mOsmol/kg   Glomerular Filtration Rate, Estimated    Collection Time: 02/25/21 12:55 AM   Result Value Ref Range    Est, Glom Filt Rate 70 (A) ml/min/1.73m2   Blood gas, arterial    Collection Time: 02/25/21  1:08 AM   Result Value Ref Range    pH, Blood Gas 7.35 7.35 - 7.45    PCO2 44 35 - 45 mmhg    PO2 61 (L) 71 - 104 mmhg    HCO3 24 23 - 28 mmol/l    Base Excess (Calculated) -2.0 -2.5 - 2.5 mmol/l    O2 Sat 90 %    DEVICE Room Air     Victoriano Test Positive     Source: R Radial     COLLECTED BY: [id number]    Ammonia    Collection Time: 02/25/21  1:45 AM   Result Value Ref Range    Ammonia 20 11 - 60 umol/L   Urine Drug Screen    Collection Time: 02/25/21  2:45 AM   Result Value Ref Range    AMPHETAMINE+METHAMPHETAMINE URINE SCREEN negative NEGATIVE    Barbiturate Quant, Ur negative NEGATIVE    Benzodiazepine Quant, Ur negative NEGATIVE    Cannabinoid Quant, Ur negative NEGATIVE    Cocaine Metab Quant, Ur negative NEGATIVE    Opiates, Urine negative NEGATIVE    Oxycodone negative NEGATIVE    PCP Quant, Ur negative NEGATIVE   Urinalysis Reflex to Culture    Collection Time: 02/25/21  2:45 AM    Specimen: Urine, clean catch   Result Value Ref Range    Glucose, Ur NEGATIVE NEGATIVE mg/dl    Bilirubin Urine NEGATIVE NEGATIVE    Ketones, Urine TRACE (A) NEGATIVE    Specific Gravity, Urine 1.007 1.002 - 1.030    Blood, Urine NEGATIVE NEGATIVE    pH, UA 5.0 5.0 - 9.0    Protein, UA NEGATIVE NEGATIVE    Urobilinogen, Urine 0.2 0.0 - 1.0 eu/dl    Nitrite, Urine NEGATIVE NEGATIVE    Leukocyte Esterase, Urine NEGATIVE NEGATIVE    Color, UA YELLOW STRAW-YELLOW    Character, Urine CLEAR CLEAR-SL CLOUD   CBC    Collection Time: 02/25/21  9:40 AM   Result Value Ref Range    WBC 11.8 (H) 4.8 - 10.8 thou/mm3    RBC 5.09 4.70 - 6.10 mill/mm3    Hemoglobin 15.1 14.0 - 18.0 gm/dl    Hematocrit 46.0 42.0 - 52.0 %    MCV 90.4 80.0 - 94.0 fL    MCH 29.7 26.0 - 33.0 pg    MCHC 32.8 32.2 - 35.5 gm/dl    RDW-CV 13.6 11.5 - 14.5 %    RDW-SD 45.5 (H) 35.0 - 45.0 fL    Platelets 557 270 - 044 thou/mm3    MPV 9.7 9.4 - 12.4 fL   Protime-INR    Collection Time: 02/25/21  9:40 AM   Result Value Ref Range    INR 0.99 0.85 - 1.13   Ammonia    Collection Time: 02/25/21  9:40 AM   Result Value Ref Range    Ammonia 26 11 - 60 umol/L   Osmolality, Serum    Collection Time: 02/25/21  9:40 AM   Result Value Ref Range    Osmolality 302 (H) 275 - 295 mosmol/kg   Comprehensive Metabolic Panel w/ Reflex to MG    Collection Time: 02/25/21  9:41 AM   Result Value Ref Range    Glucose 117 (H) 70 - 108 mg/dL    CREATININE 0.9 0.4 - 1.2 mg/dL    BUN 14 7 - 22 mg/dL    Sodium 143 135 - 145 meq/L    Potassium reflex Magnesium 3.8 3.5 - 5.2 meq/L    Chloride 107 98 - 111 meq/L    CO2 25 23 - 33 meq/L    Calcium 9.8 8.5 - 10.5 mg/dL    AST 20 5 - 40 U/L    Alkaline Phosphatase 88 38 - 126 U/L    Total Protein 7.8 6.1 - 8.0 g/dL    Albumin 4.4 3.5 - 5.1 g/dL    Total Bilirubin 0.5 0.3 - 1.2 mg/dL    ALT 17 11 - 66 U/L   Lactate, Sepsis    Collection Time: 02/25/21  9:41 AM   Result Value Ref Range    Lactic Acid, Sepsis 1.4 0.5 - 1.9 mmol/L   T4, Free    Collection Time: 02/25/21  9:41 AM   Result Value Ref Range    T4 Free 1.03 0.93 - 1.76 ng/dL   Hemoglobin A1c    Collection Time: 02/25/21  9:41 AM   Result Value Ref Range    Hemoglobin A1C 9.3 (H) 4.4 - 6.4 %    AVERAGE GLUCOSE 219 (H) 70 - 126 mg/dL   Anion Gap    Collection Time: 02/25/21  9:41 AM   Result Value Ref Range    Anion Gap 11.0 8.0 - 16.0 meq/L   Glomerular Filtration Rate, Estimated    Collection Time: 02/25/21  9:41 AM   Result Value Ref Range    Est, Glom Filt Rate 88 (A) ml/min/1.73m2   Lactate, Sepsis Collection Time: 02/25/21 11:30 AM   Result Value Ref Range    Lactic Acid, Sepsis 1.4 0.5 - 1.9 mmol/L         XR CHEST PORTABLE   Final Result   Impression:   1. No acute cardiopulmonary disease. 2.  Poor inspiratory effort accentuating the heart size and pulmonary    vessels. This document has been electronically signed by: Davidson Lopez MD on    02/25/2021 01:25 AM      CT HEAD WO CONTRAST (CODE STROKE)   Final Result   Impression:   1. No CT evidence of acute intracranial pathology. This document has been electronically signed by: Davidson Lopez MD on    02/25/2021 12:58 AM      All CTs at this facility use dose modulation techniques and iterative    reconstructions, and/or weight-based dosing   when appropriate to reduce radiation to a low as reasonably achievable.              Electronically signed by Nasir Blanchard MD on 2/25/2021 at 2:29 PM

## 2021-02-25 NOTE — ED NOTES
Pt rolling around in bed at this time. This RN into room multiple times to redirect pt. Bed alarm on. Pt respirations unlabored.       Cuong Leach RN  02/25/21 [id number]

## 2021-02-25 NOTE — CONSULTS
NEUROLOGY CONSULT NOTE      Requesting Physician: Sharon Jennings MD    Reason for Consult:  Evaluate for altered mental status. History of Present Illness:  Penny Davila is a 48 y.o. male admitted to [facility] on 2/25/2021.     60-year-old male with a past history of substance abuse coming in today for altered mental status. In the emergency room he was found to answer all orientation questions and moving bilateral extremities equally. He was found to have dilated pupils otherwise his urine toxicology was negative and lab work is normal.    I examined him by the bedside patient is completely back to normal.  Patient's ex-wife is present and she confirms that patient is normal now. Reports no chest pain. No shortness of breath with exertion. Reports no neck pain. No vision changes. No dysphagia. No fever. No rash. No weight loss. Past Medical History:        Diagnosis Date    Seizures ([address]        History reviewed. No pertinent surgical history.     Allergies:    No Known Allergies     Current Medications:       sodium chloride flush 0.9 % injection 10 mL, 2 times per day      sodium chloride flush 0.9 % injection 10 mL, PRN      promethazine (PHENERGAN) tablet 12.5 mg, Q6H PRN    Or      ondansetron (ZOFRAN) injection 4 mg, Q6H PRN      polyethylene glycol (GLYCOLAX) packet 17 g, Daily PRN      acetaminophen (TYLENOL) tablet 650 mg, Q6H PRN    Or      acetaminophen (TYLENOL) suppository 650 mg, Q6H PRN      glucose (GLUTOSE) 40 % oral gel 15 g, PRN      dextrose 50 % IV solution, PRN      glucagon (rDNA) injection 1 mg, PRN      dextrose 5 % solution, PRN      insulin lispro (HUMALOG) injection vial 0-12 Units, TID WC      insulin lispro (HUMALOG) injection vial 0-6 Units, Nightly      pregabalin (LYRICA) capsule 100 mg, 4x Daily      FLUoxetine (PROZAC) capsule 20 mg, Daily      enoxaparin (LOVENOX) injection 40 mg, Daily         Social History:  Social History Tobacco Use   Smoking Status Former Smoker    Types: Cigarettes   Smokeless Tobacco Never Used     Social History     Substance and Sexual Activity   Alcohol Use Not Currently     Social History     Substance and Sexual Activity   Drug Use Not Currently     Single    Family History:   History reviewed. No pertinent family history. Review of Systems:  All systems reviewed and are all negative except what is mentioned in history of present illness. Physical Exam:  /86   Pulse 96   Temp 98.6 °F (37 °C) (Axillary)   Resp 18   SpO2 100%  I There is no height or weight on file to calculate BMI. I   Wt Readings from Last 1 Encounters:   No data found for Wt           General:  pleasant in no acute distress. HEENT: No pallor or icterus. Neck supple. No Carotid bruits. Heart: S1 and S2 normal with regular rhythm. No murmur. GENERAL NEUROLOGIC EXAM     Mental Status: Awake alert and oriented to person place and time. Language is normal for comprehension, repetition, naming and fluency. Recent and remote memory were normal.  Attention span and concentration were normal. Fund of knowledge was normal.      Cranial nerves:  Fundi were normal bilaterally. Visual fields were full to confrontation. Pupils are equal regular and reactive to light. Extraocular movements are intact. Facial sensation was normal and symmetrical bilaterally. Muscles of facial expression were strong and symmetric. Hearing to finger rub normal bilaterally. Palate elevates symmetrically. Sternocleidomastoid and trapezius normal. The tongue protrudes midline without atrophy or fasciculations. Motor: Normal tone and bulk with no involuntary movements or pronator drift. Strength normal 5/5 in bilateral upper and lower extremities. Sensation: Light touch, pin prick, vibration and joint position sense were normal in the upper and lower extremities.      Reflexes: 1+ bilaterally symmetrical with down going toes.    Coordination: finger nose and heel shin test normal bilaterally. Gait: Normal station and gait. Heel, toe and tandem walk normal.  Romberg negative. Labs:    CBC:   Recent Labs     02/25/21  0055 02/25/21  0940   WBC 10.6 11.8*   HGB 14.7 15.1    262   MCV 87.7 90.4   MCH 29.6 29.7   MCHC 33.7 32.8     CMP:  Recent Labs     02/25/21  0055 02/25/21  0941    143   K 3.9 3.8    107   CO2 21* 25   BUN 18 14   CREATININE 1.1 0.9   LABGLOM 70* 88*   GLUCOSE 131* 117*   CALCIUM 9.7 9.8     Liver:   Recent Labs     02/25/21  0941   AST 20   ALT 17   ALKPHOS 88   PROT 7.8   LABALBU 4.4   BILITOT 0.5     Stroke Labs:   Recent Labs     02/25/21  0055   TSH 0.533       Imaging:  No results found for this or any previous visit. No results found for this or any previous visit. No results found for this or any previous visit. No results found for this or any previous visit. No results found for this or any previous visit. No results found for this or any previous visit. No results found for this or any previous visit. Results for orders placed during the hospital encounter of 02/25/21   CT HEAD WO CONTRAST (CODE STROKE)    Narrative   This report was discussed with Yaw Laguna on Feb 25, 2021 01:00:00   EST. This document has been electronically signed by: Jimmy Smith on   02/25/2021 01:00 AM    CT head  without IV contrast    Comparison: None    Findings:  The ventricles are normal in configuration. Basal cisterns intact. No   intracranial hemorrhage, mass-effect or midline shift. No extra-axial   fluid collections. The parenchyma is unremarkable in attenuation. Gray-white matter junction preserved. No evidence of acute large vessel   or territorial ischemia. Brainstem and cerebellum unremarkable. Suspect   choroidal cyst on the right. The calvarium is intact. The imaged portion of the paranasal are clear. No mastoid effusions.   The orbital contents are unremarkable. Impression Impression:  1. No CT evidence of acute intracranial pathology. This document has been electronically signed by: Omayra Calderon MD on   02/25/2021 12:58 AM    All CTs at this facility use dose modulation techniques and iterative   reconstructions, and/or weight-based dosing  when appropriate to reduce radiation to a low as reasonably achievable. We reviewed the patient records and available information in the EHR     Active Problems:    Acute encephalopathy  Resolved Problems:    * No resolved hospital problems. *      ASSESSMENT/PLAN: This is a 48 y.o. male who  has a past medical history of Seizures ([facility] [address] He presents with altered mental status of unknown duration. No focal deficits. CT head negative. 1.  Acute encephalopathy of unknown etiology: Questionable medication encephalopathy versus seizures    -Urinalysis negative for infection  -EEG pending  -Reduce pregabalin to 75 mg 3 times daily    If EEG is negative patient can be discharged from neurological standpoint. Thank you for allowing us to participate in the care of this patient.       Electronically signed by Divina Gale MD on 2/25/2021 at 10:54 AM

## 2021-02-25 NOTE — ED NOTES
Pt awake and talking at this time. Dr. Juan Duckworth and Kandice peter NP at bedside. Respirations unlabored.       Guero Leach RN  02/25/21 [id number]

## 2021-02-25 NOTE — ED NOTES
Pt to ED via squad from [facility] for sober living. Pt was seen around 5pm yesterday in the common area acting appropriately. The pt was then seen around 10pm strewn across his bed oddly and then began throwing objects around the area when [facility] and EMS were called. Pt is intermittently alert. Pt is arousable to his name. Pt snaps his head around quickly and smiles. Pt drooling. Pt stating that he is at home and continues to repeat \"2020\". Monitor applied. EKG complete. IV inserted.       723 Josiah Ames RN  02/25/21 [id number]

## 2021-02-26 ENCOUNTER — APPOINTMENT (OUTPATIENT)
Dept: GENERAL RADIOLOGY | Age: 56
DRG: 380 | End: 2021-02-26
Payer: MEDICAID

## 2021-02-26 LAB
ALBUMIN SERPL-MCNC: 3.8 G/DL (ref 3.5–5.1)
ALP BLD-CCNC: 75 U/L (ref 38–126)
ALT SERPL-CCNC: 14 U/L (ref 11–66)
ANION GAP SERPL CALCULATED.3IONS-SCNC: 7 MEQ/L (ref 8–16)
AST SERPL-CCNC: 14 U/L (ref 5–40)
BILIRUB SERPL-MCNC: 0.4 MG/DL (ref 0.3–1.2)
BUN BLDV-MCNC: 19 MG/DL (ref 7–22)
CALCIUM SERPL-MCNC: 9.2 MG/DL (ref 8.5–10.5)
CHLORIDE BLD-SCNC: 107 MEQ/L (ref 98–111)
CO2: 27 MEQ/L (ref 23–33)
CREAT SERPL-MCNC: 1.2 MG/DL (ref 0.4–1.2)
ERYTHROCYTE [DISTWIDTH] IN BLOOD BY AUTOMATED COUNT: 14.1 % (ref 11.5–14.5)
ERYTHROCYTE [DISTWIDTH] IN BLOOD BY AUTOMATED COUNT: 46.7 FL (ref 35–45)
GFR SERPL CREATININE-BSD FRML MDRD: 63 ML/MIN/1.73M2
GLUCOSE BLD-MCNC: 138 MG/DL (ref 70–108)
GLUCOSE BLD-MCNC: 142 MG/DL (ref 70–108)
GLUCOSE BLD-MCNC: 158 MG/DL (ref 70–108)
GLUCOSE BLD-MCNC: 353 MG/DL (ref 70–108)
GLUCOSE BLD-MCNC: 75 MG/DL (ref 70–108)
HCT VFR BLD CALC: 42.3 % (ref 42–52)
HEMOGLOBIN: 13.6 GM/DL (ref 14–18)
MCH RBC QN AUTO: 29.2 PG (ref 26–33)
MCHC RBC AUTO-ENTMCNC: 32.2 GM/DL (ref 32.2–35.5)
MCV RBC AUTO: 91 FL (ref 80–94)
PLATELET # BLD: 220 THOU/MM3 (ref 130–400)
PMV BLD AUTO: 9.7 FL (ref 9.4–12.4)
POTASSIUM REFLEX MAGNESIUM: 4.2 MEQ/L (ref 3.5–5.2)
RBC # BLD: 4.65 MILL/MM3 (ref 4.7–6.1)
SODIUM BLD-SCNC: 141 MEQ/L (ref 135–145)
TOTAL PROTEIN: 6.8 G/DL (ref 6.1–8)
WBC # BLD: 8.1 THOU/MM3 (ref 4.8–10.8)

## 2021-02-26 PROCEDURE — 36415 COLL VENOUS BLD VENIPUNCTURE: CPT

## 2021-02-26 PROCEDURE — 6370000000 HC RX 637 (ALT 250 FOR IP): Performed by: INTERNAL MEDICINE

## 2021-02-26 PROCEDURE — 87075 CULTR BACTERIA EXCEPT BLOOD: CPT

## 2021-02-26 PROCEDURE — 80053 COMPREHEN METABOLIC PANEL: CPT

## 2021-02-26 PROCEDURE — 6370000000 HC RX 637 (ALT 250 FOR IP): Performed by: FAMILY MEDICINE

## 2021-02-26 PROCEDURE — 87040 BLOOD CULTURE FOR BACTERIA: CPT

## 2021-02-26 PROCEDURE — 85027 COMPLETE CBC AUTOMATED: CPT

## 2021-02-26 PROCEDURE — 99232 SBSQ HOSP IP/OBS MODERATE 35: CPT | Performed by: PSYCHIATRY & NEUROLOGY

## 2021-02-26 PROCEDURE — 99232 SBSQ HOSP IP/OBS MODERATE 35: CPT | Performed by: FAMILY MEDICINE

## 2021-02-26 PROCEDURE — 6360000002 HC RX W HCPCS: Performed by: INTERNAL MEDICINE

## 2021-02-26 PROCEDURE — 82948 REAGENT STRIP/BLOOD GLUCOSE: CPT

## 2021-02-26 PROCEDURE — 6370000000 HC RX 637 (ALT 250 FOR IP): Performed by: PSYCHIATRY & NEUROLOGY

## 2021-02-26 PROCEDURE — 87077 CULTURE AEROBIC IDENTIFY: CPT

## 2021-02-26 PROCEDURE — 2580000003 HC RX 258: Performed by: STUDENT IN AN ORGANIZED HEALTH CARE EDUCATION/TRAINING PROGRAM

## 2021-02-26 PROCEDURE — 87147 CULTURE TYPE IMMUNOLOGIC: CPT

## 2021-02-26 PROCEDURE — 87186 SC STD MICRODIL/AGAR DIL: CPT

## 2021-02-26 PROCEDURE — 87205 SMEAR GRAM STAIN: CPT

## 2021-02-26 PROCEDURE — 87070 CULTURE OTHR SPECIMN AEROBIC: CPT

## 2021-02-26 PROCEDURE — 2580000003 HC RX 258: Performed by: INTERNAL MEDICINE

## 2021-02-26 PROCEDURE — 73630 X-RAY EXAM OF FOOT: CPT

## 2021-02-26 PROCEDURE — 1200000003 HC TELEMETRY R&B

## 2021-02-26 PROCEDURE — 6360000002 HC RX W HCPCS: Performed by: STUDENT IN AN ORGANIZED HEALTH CARE EDUCATION/TRAINING PROGRAM

## 2021-02-26 RX ORDER — LABETALOL 20 MG/4 ML (5 MG/ML) INTRAVENOUS SYRINGE
5 EVERY 4 HOURS PRN
Status: DISCONTINUED | OUTPATIENT
Start: 2021-02-26 | End: 2021-02-28 | Stop reason: HOSPADM

## 2021-02-26 RX ORDER — KETOCONAZOLE 20 MG/ML
SHAMPOO TOPICAL
Status: DISCONTINUED | OUTPATIENT
Start: 2021-02-26 | End: 2021-02-28 | Stop reason: HOSPADM

## 2021-02-26 RX ORDER — LEVETIRACETAM 500 MG/1
500 TABLET ORAL 2 TIMES DAILY
Status: DISCONTINUED | OUTPATIENT
Start: 2021-02-26 | End: 2021-02-28 | Stop reason: HOSPADM

## 2021-02-26 RX ORDER — TERBINAFINE HYDROCHLORIDE 250 MG/1
250 TABLET ORAL DAILY
Status: DISCONTINUED | OUTPATIENT
Start: 2021-02-26 | End: 2021-02-28 | Stop reason: HOSPADM

## 2021-02-26 RX ORDER — LISINOPRIL 40 MG/1
40 TABLET ORAL DAILY
Status: DISCONTINUED | OUTPATIENT
Start: 2021-02-27 | End: 2021-02-28 | Stop reason: HOSPADM

## 2021-02-26 RX ORDER — INSULIN GLARGINE 100 [IU]/ML
10 INJECTION, SOLUTION SUBCUTANEOUS
Status: DISCONTINUED | OUTPATIENT
Start: 2021-02-27 | End: 2021-02-28 | Stop reason: HOSPADM

## 2021-02-26 RX ORDER — TRAMADOL HYDROCHLORIDE 50 MG/1
50 TABLET ORAL EVERY 8 HOURS PRN
Status: DISCONTINUED | OUTPATIENT
Start: 2021-02-26 | End: 2021-02-28 | Stop reason: HOSPADM

## 2021-02-26 RX ORDER — LIDOCAINE 4 G/G
1 PATCH TOPICAL DAILY
Status: DISCONTINUED | OUTPATIENT
Start: 2021-02-26 | End: 2021-02-28 | Stop reason: HOSPADM

## 2021-02-26 RX ADMIN — PIPERACILLIN AND TAZOBACTAM 3375 MG: 3; .375 INJECTION, POWDER, LYOPHILIZED, FOR SOLUTION INTRAVENOUS at 10:50

## 2021-02-26 RX ADMIN — TERBINAFINE 250 MG: 250 TABLET ORAL at 17:43

## 2021-02-26 RX ADMIN — PREGABALIN 75 MG: 75 CAPSULE ORAL at 16:20

## 2021-02-26 RX ADMIN — INSULIN LISPRO 10 UNITS: 100 INJECTION, SOLUTION INTRAVENOUS; SUBCUTANEOUS at 16:24

## 2021-02-26 RX ADMIN — PIPERACILLIN AND TAZOBACTAM 3375 MG: 3; .375 INJECTION, POWDER, LYOPHILIZED, FOR SOLUTION INTRAVENOUS at 20:31

## 2021-02-26 RX ADMIN — LEVETIRACETAM 500 MG: 500 TABLET, FILM COATED ORAL at 20:30

## 2021-02-26 RX ADMIN — KETOCONAZOLE: 20 SHAMPOO TOPICAL at 20:32

## 2021-02-26 RX ADMIN — ENOXAPARIN SODIUM 40 MG: 40 INJECTION SUBCUTANEOUS at 10:48

## 2021-02-26 RX ADMIN — SODIUM CHLORIDE, PRESERVATIVE FREE 10 ML: 5 INJECTION INTRAVENOUS at 20:32

## 2021-02-26 RX ADMIN — TRAMADOL HYDROCHLORIDE 50 MG: 50 TABLET, FILM COATED ORAL at 16:35

## 2021-02-26 RX ADMIN — VANCOMYCIN HYDROCHLORIDE 1750 MG: 5 INJECTION, POWDER, LYOPHILIZED, FOR SOLUTION INTRAVENOUS at 13:59

## 2021-02-26 RX ADMIN — DULOXETINE HYDROCHLORIDE 60 MG: 60 CAPSULE, DELAYED RELEASE ORAL at 10:47

## 2021-02-26 RX ADMIN — SODIUM CHLORIDE, PRESERVATIVE FREE 10 ML: 5 INJECTION INTRAVENOUS at 10:49

## 2021-02-26 RX ADMIN — PREGABALIN 75 MG: 75 CAPSULE ORAL at 20:30

## 2021-02-26 RX ADMIN — LEVETIRACETAM 500 MG: 500 TABLET, FILM COATED ORAL at 13:52

## 2021-02-26 RX ADMIN — PREGABALIN 75 MG: 75 CAPSULE ORAL at 10:48

## 2021-02-26 RX ADMIN — ACETAMINOPHEN 650 MG: 325 TABLET ORAL at 16:20

## 2021-02-26 ASSESSMENT — PAIN SCALES - GENERAL
PAINLEVEL_OUTOF10: 8
PAINLEVEL_OUTOF10: 8

## 2021-02-26 NOTE — CONSULTS
Meds:   piperacillin-tazobactam  3,375 mg Intravenous Q8H    levETIRAcetam  500 mg Oral BID    lidocaine  1 patch Transdermal Daily    ketoconazole   Topical Q3 Days    terbinafine  250 mg Oral Daily    sodium chloride flush  10 mL Intravenous 2 times per day    insulin lispro  0-12 Units Subcutaneous TID WC    insulin lispro  0-6 Units Subcutaneous Nightly    enoxaparin  40 mg Subcutaneous Daily    pregabalin  75 mg Oral TID    DULoxetine  60 mg Oral Daily     Continuous Infusions:   dextrose       PRN Meds:traMADol, sodium chloride flush, promethazine **OR** ondansetron, polyethylene glycol, acetaminophen **OR** acetaminophen, glucose, dextrose, glucagon (rDNA), dextrose  Allergies:   ALLERGIES:    Patient has no known allergies. SOCIAL HISTORY:     TOBACCO:   reports that he has quit smoking. His smoking use included cigarettes. He has never used smokeless tobacco.     ETOH:   reports previous alcohol use. FAMILY HISTORY:     History reviewed. No pertinent family history. REVIEW OF SYSTEMS:     Constitutional: no fever, no night sweats, no fatigue, no weight loss. Head: no head ache , no head injury, no migranes. Eye: no eye discharge, blurring of vision, no double vision,no eye pain. Ears: no hearing difficulty, no tinnitus  Mouth/throat: no ulceration, dental caries , dysphagia, no hoarseness and voice change  Respiratory: no cough no chest pain,no shortness of breath,no wheezing  CVS: no palpitation, no chest pain,   GI: no abdominal pain, no nausea , no vomiting, no constipation,no diarrhea.   AYDEN: no dysuria, frequency and urgency, no hematuria, no kidney stones  Musculoskeletal: chronic back pain, breast as noted in HPI  Endocrine: no polyuria, polydipsia, no cold or heat intolerance  Hematology: no anemia, no easy brusing or bleeding, no hx of clotting disorder  Dermatology:+ skin rash, no skin lesions, no pruritis,  Neurological:no headaches,no dizziness, no seizure, no BC    Problem list of patient      Patient Active Problem List   Diagnosis Code    Acute encephalopathy G93.40           Impression and Recommendation:   Change in mental state: Has resolved. It may be related to his medications. Diabetes with neuropathy  Diabetic foot ulcer with cellulitis: Continue current IV antibiotic. Will transition patient to oral antibiotic  History of seizure disorder: Current change in mental state does not appear to be due to seizure. We will follow culture report and transition him to oral antibiotic. Thank you Diogo Rankin MD for allowing me to participate in this patient's care.     Hilton Homans, MD,FACP 2/26/2021 6:31 PM

## 2021-02-26 NOTE — PROCEDURES
[address] [place] [address]                          ELECTROENCEPHALOGRAM REPORT    PATIENT NAME: Glendy Morrison                   :        1965  MED REC NO:   [id number]                           ROOM:       [id number]  ACCOUNT NO:   [de-identified]                           ADMIT DATE: 2021  PROVIDER:     Ethan Moulton. Zaheer Mccormick MD    DATE OF EE2021    REFERRING PROVIDER: Brock Gilbert MD    CLINICAL HISTORY:  A 25-year-old male presenting with altered mental  status, confusion, history of seizure, substance abuse. Medications listed are insulin, Lovenox, pregabalin, duloxetine,  dextrose. This is a 17-channel EEG performed without sleep deprivation. Hyperventilation was not performed. Photic stimulation was not  performed. The patient is described as alert. Background rhythm activity is noted to be 7-8 Hz in the posterior  parietal area, symmetric, attenuates with eye opening. The patient will  be drowsy during parts of the recording. Bifrontal high-voltage delta  activity is observed intermittently during the recording that may  represent a paradoxical cortical dysfunction or indicate seizure  tendency in the proper clinical context. No clinical seizures were  observed. Lead artifact and muscle artifact were noted. IMPRESSION:  This is an abnormal EEG due to presence of bifrontal  high-voltage delta activity intermittently during the recording that may  represent a paradoxical cortical dysfunction or indicate seizure  tendency in the proper clinical context. No clinical seizures were  observed. Clinical correlation is recommended.         Lucy Don MD    D: 2021 8:49:42       T: 2021 8:59:38     [email or web address]  Job#: [id number]     Doc#: [id number]    CC:

## 2021-02-26 NOTE — PROGRESS NOTES
02/25/21 1723    insulin lispro (HUMALOG) injection vial 0-6 Units  0-6 Units Subcutaneous Nightly Catherin Lefort, MD   1 Units at 02/25/21 2105    enoxaparin (LOVENOX) injection 40 mg  40 mg Subcutaneous Daily Catherin Lefort, MD   40 mg at 02/25/21 1130    pregabalin (LYRICA) capsule 75 mg  75 mg Oral TID Duke Ashley MD   75 mg at 02/25/21 2029    DULoxetine (CYMBALTA) extended release capsule 60 mg  60 mg Oral Daily Catherin Lefort, MD   60 mg at 02/25/21 2028     No Known Allergies    PHYSICAL EXAMINATION:    Vitals:   Patient Vitals for the past 4 hrs:   BP Temp Temp src Pulse Resp SpO2   02/26/21 0430 137/85 98.1 °F (36.7 °C) Oral 88 18 95 %           General:  pleasant in no acute distress. HEENT: No pallor or icterus. Neck supple. No Carotid bruits. Heart: S1 and S2 normal with regular rhythm. No murmur. GENERAL NEUROLOGIC EXAM     Mental Status:   Awake alert and oriented to person place and time. Language is normal for comprehension and fluency. Attention span and concentration were normal.       Cranial nerves:      Pupils are equal regular and reactive to light. Muscles of facial expression were strong and symmetric. The tongue protrudes midline without atrophy or fasciculations. Motor: Normal tone and bulk with no involuntary movements or pronator drift. Strength normal 5/5 in bilateral upper and lower extremities. Sensation: Light touch, pin prick, vibration and joint position sense were normal in the upper and lower extremities. Reflexes: 1+ bilaterally symmetrical with down going toes. Coordination: finger nose and heel shin test normal bilaterally. Gait: Normal station and gait. Heel, toe and tandem walk normal.  Romberg negative.            Labs:     CBC:   Recent Labs     02/25/21  0055 02/25/21  0940 02/26/21  0451   WBC 10.6 11.8* 8.1   HGB 14.7 15.1 13.6*    262 220   MCV 87.7 90.4 91.0   MCH 29.6 29.7 29.2   MCHC 33.7 32.8 32.2     CMP:  Recent Labs     02/25/21  0055 02/25/21  0941 02/26/21  0451    143 141   K 3.9 3.8 4.2    107 107   CO2 21* 25 27   BUN 18 14 19   CREATININE 1.1 0.9 1.2   LABGLOM 70* 88* 63*   GLUCOSE 131* 117* 138*   CALCIUM 9.7 9.8 9.2     Liver:   Recent Labs     02/26/21  0451   AST 14   ALT 14   ALKPHOS 75   PROT 6.8   LABALBU 3.8   BILITOT 0.4     Stroke Labs:   Recent Labs     02/25/21  0055 02/25/21  0941   TSH 0.533  --    LABA1C  --  9.3*       Imaging:  No results found for this or any previous visit. No results found for this or any previous visit. No results found for this or any previous visit. No results found for this or any previous visit. No results found for this or any previous visit. No results found for this or any previous visit. No results found for this or any previous visit. Results for orders placed during the hospital encounter of 02/25/21   CT HEAD WO CONTRAST (CODE STROKE)    Narrative   This report was discussed with Lucero Orellana on Feb 25, 2021 01:00:00   EST. This document has been electronically signed by: Kati Burnette on   02/25/2021 01:00 AM  T head  without IV contrast    Comparison: None    Findings:  The ventricles are normal in configuration. Basal cisterns intact. No   intracranial hemorrhage, mass-effect or midline shift. No extra-axial   fluid collections. The parenchyma is unremarkable in attenuation. Gray-white matter junction preserved. No evidence of acute large vessel   or territorial ischemia. Brainstem and cerebellum unremarkable. Suspect   choroidal cyst on the right. The calvarium is intact. The imaged portion of the paranasal are clear. No mastoid effusions. The orbital contents are unremarkable. Impression Impression:  1. No CT evidence of acute intracranial pathology.     This document has been electronically signed by: Vincent Berrios MD on   02/25/2021 12:58 AM    All CTs at this facility use dose modulation techniques and iterative   reconstructions, and/or weight-based dosing  when appropriate to reduce radiation to a low as reasonably achievable. Active Problems:    Acute encephalopathy  Resolved Problems:    * No resolved hospital problems. *       ASSESSMENT/PLAN:   This is a 48 y.o. male who  has a past medical history of Seizures ([facility] [address] He presents with altered mental status of unknown duration. No focal deficits. CT head negative. -Urinalysis negative for infection     1. Acute encephalopathy of unknown etiology: Questionable medication encephalopathy versus seizures: -EEG abnormal for bifrontal high-voltage delta activity.    -Reduce pregabalin to 75 mg 3 times daily  -Start Keppra 500 twice daily     Patient can be discharged from neurological standpoint. Follow-up with Dr. Carmita Watson in 4 to 6 weeks after discharge    Thank you for allowing us to participate in the care of this patient.     Electronically signed by Alton Araya MD on 2/26/2021 at 8:00 AM

## 2021-02-26 NOTE — CARE COORDINATION
2/26/21, 8:29 AM EST  DISCHARGE PLANNING EVALUATION:    Victory Layer       Admitted: 2/25/2021/ [facility] day: 1   Location: [facility]23/023-A Reason for admit: Acute encephalopathy [G93.40]   PMH:  has a past medical history of Seizures ([facility] [address] Barriers to Discharge:  EEG completed, okay for discharge from neuro standpoint if neg. DM management. Podiatry consulted, cx taken from left foot wound. PCP: Dr. Joanna So   Readmission Risk Score: 9%    Patient Goals/Plan/Treatment Preferences: Spoke with Lindsey Tineo, he plans to return home with his wife as PTA. He follows with Dr. Dave Lester at [facility] for his foot wounds and has surgical shoes for ambulation. He denies needs for [facility] and [facility]. Transportation/Food Security/Housekeeping Addressed:  No issues identified.

## 2021-02-26 NOTE — PROGRESS NOTES
Pharmacy Note  Vancomycin Consult    Nelson Lucas is a 48 y.o. male started on Vancomycin for bilateral diabetic ulcers and cellulitis of the 5th metatarsal base; consult received from Dr. Brady Anderson to provide a one time dose. Also receiving the following antibiotics: Zosyn. Patient Active Problem List   Diagnosis    Acute encephalopathy       Allergies:  Patient has no known allergies. Temp max: 98.6    Recent Labs     02/25/21  0941 02/26/21  0451   BUN 14 19     Recent Labs     02/25/21  0941 02/26/21  0451   CREATININE 0.9 1.2     Recent Labs     02/25/21  0940 02/26/21  0451   WBC 11.8* 8.1     Culture Date Source Results   2/26/2021 Tissue culture sent                Ht Readings from Last 1 Encounters:   02/26/21 6' 1\" (1.854 m)        Wt Readings from Last 1 Encounters:   02/26/21 265 lb 12.8 oz (120.6 kg)       Body mass index is 35.07 kg/m². CrCl: 96 mL/min    Assessment/Plan:  Will give a one time dose of vancomycin 1750 mg (~15 mg/kg). Thank you for the consult.   Kathi Bustillos, PharmD, BCPS, [facility] 2/26/2021 10:48 AM

## 2021-02-26 NOTE — PROGRESS NOTES
Hospitalist Progress Note    Patient:  Christa Zamora      Unit/Bed:6K-23/023-A    YOB: 1965    MRN: [id number]       Acct: [de-identified]     PCP: Roman Rosado MD    Date of Admission: 2/25/2021    Chief Complaint: Follow-up for confusion    Hospital Course:     Per H&P note:    \"Patient is a 67-year-old male with a medical history of insulin-dependent type 2 diabetes with history of peripheral neuropathy and amputation of multiple phalanges, hypertension, CKD, seizure disorder, reported polysubstance use disorder. Patient presented to the ER yesterday with confusion. Labs were fairly unremarkable and there is no evidence of any illicit substances in his urine. Patient would answer some questions but then would be nonsensical at other times. He was found down at a sober house. He was given IV fluids and Ativan and then admitted to the hospitalist service. Per my interview the patient is more oriented at this time. He knows he is at a hospital and he knows the month and year. He says that this has happened to him before where he presents to the hospital and is unsure why he is there. When I try to obtain further medical history the patient is a very poor historian. When I prompted him for certain illnesses he is able to elaborate some but is not able to provide much information. He acknowledges that he is a diabetic and takes insulin. He states that he does have a history of seizure disorder but then states he was never on any medication. Chart review in care everywhere demonstrates he has been admitted before for alcohol withdrawal seizures and has been having issues with chronic back pain for which she was on a Subutex patch and was following pain management. He denies any recent alcohol or illicit drug use. He does not recall why he was found down. He states that when he has a seizure is generally a general tonic-clonic seizure. He states his last seizure was a few months ago. He otherwise denies any chest pain, shortness of breath, fevers, chills, diarrhea, nausea, vomiting, headache, vision changes. \"      Subjective:     Patient seen and examined. Pt denies HA, blurry vision, chest pain, sob, N/V, abd pain, diarrhea, UTI symptoms. He's c/o chronic lumbar pain x 1 year now, worsening for past 2 months per pt. Denies injury. Low back pain is constant, 8/10. He states that he had ablation and about to see neurosurgeon in OP. He reports that he has B/L feet swelling for past few months, has been worsening for past few weeks and had multiple toe amputation. Pt's wife and son at bedside      Medications:  Reviewed    Infusion Medications    dextrose       Scheduled Medications    piperacillin-tazobactam  3,375 mg Intravenous Q8H    levETIRAcetam  500 mg Oral BID    vancomycin  1,750 mg Intravenous Once    lidocaine  1 patch Transdermal Daily    ketoconazole   Topical Q3 Days    terbinafine  250 mg Oral Daily    sodium chloride flush  10 mL Intravenous 2 times per day    insulin lispro  0-12 Units Subcutaneous TID WC    insulin lispro  0-6 Units Subcutaneous Nightly    enoxaparin  40 mg Subcutaneous Daily    pregabalin  75 mg Oral TID    DULoxetine  60 mg Oral Daily     PRN Meds: sodium chloride flush, promethazine **OR** ondansetron, polyethylene glycol, acetaminophen **OR** acetaminophen, glucose, dextrose, glucagon (rDNA), dextrose    No intake or output data in the 24 hours ending 02/26/21 1254    Diet:  DIET CARB CONTROL; Exam:  BP (!) 142/86   Pulse 84   Temp 97.9 °F (36.6 °C)   Resp 18   Ht 6' 1\" (1.854 m)   Wt 265 lb 12.8 oz (120.6 kg)   SpO2 96%   BMI 35.07 kg/m²     General appearance: alert, not in acute distress   HEENT: Pupils equal, round, and reactive to light. Conjunctivae clear. Neck: Supple, with full range of motion. No jugular venous distention. Trachea midline. Respiratory:  Normal respiratory effort.  Clear to auscultation, bilaterally without Rales/Wheezes/Rhonchi. Cardiovascular: normal rate, regular rhythm with normal S1/S2 without murmurs, rubs or gallops. Abdomen: Soft, non-tender, non-distended with normal bowel sounds. Musculoskeletal: passive and active ROM x 4 extremities. (+) lumbar tenderness, (+) B/L straight leg test  Skin: (+) scattered scaly, erythematous/pinkish on base rashes on the scalp  Neurological exam reveals alert, oriented, normal speech, no focal findings or movement disorder noted. Exam of extremities: feet wrapped with ace wrap      Labs:   Recent Labs     02/25/21 0055 02/25/21  0940 02/26/21  0451   WBC 10.6 11.8* 8.1   HGB 14.7 15.1 13.6*   HCT 43.6 46.0 42.3    262 220     Recent Labs     02/25/21 0055 02/25/21  0941 02/26/21  0451    143 141   K 3.9 3.8 4.2    107 107   CO2 21* 25 27   BUN 18 14 19   CREATININE 1.1 0.9 1.2   CALCIUM 9.7 9.8 9.2     Recent Labs     02/25/21 0055 02/25/21  0941 02/26/21  0451   AST 20 20 14   ALT 16 17 14   BILIDIR <0.2  --   --    BILITOT 0.4 0.5 0.4   ALKPHOS 87 88 75     Recent Labs     02/25/21 0055 02/25/21  0940   INR 0.97 0.99     No results for input(s): Sarah Mcginnis in the last 72 hours. Urinalysis:      Lab Results   Component Value Date    NITRU NEGATIVE 02/25/2021    BLOODU NEGATIVE 02/25/2021    GLUCOSEU NEGATIVE 02/25/2021       Radiology:  XR FOOT LEFT (MIN 3 VIEWS)   Final Result    Amputation of the fifth digit with heterotopic ossification without definite evidence of osteomyelitis to limits of the sensitivity of this examination. **This report has been created using voice recognition software. It may contain minor errors which are inherent in voice recognition technology. **      Final report electronically signed by Dr. Yas Aguilar MD on 2/26/2021 10:40 AM      XR CHEST PORTABLE   Final Result   Impression:   1. No acute cardiopulmonary disease.    2.  Poor inspiratory effort accentuating the heart size and pulmonary    vessels. This document has been electronically signed by: Rustam Villar MD on    02/25/2021 01:25 AM      CT HEAD WO CONTRAST (CODE STROKE)   Final Result   Impression:   1. No CT evidence of acute intracranial pathology. This document has been electronically signed by: Rustam Villar MD on    02/25/2021 12:58 AM      All CTs at this facility use dose modulation techniques and iterative    reconstructions, and/or weight-based dosing   when appropriate to reduce radiation to a low as reasonably achievable. Diet: DIET CARB CONTROL; Assessment/Plan:      Acute encephalopathy, exact etiology unclear, questionable medication-induced vs seizure vs infection (diabetic foot ulcer)    -per neurology note, EEG is abnormal for bifrontal high-voltage delta activity  -UA (-) for infection  -UDS (-)   -neurology started keppra 500 mg PO BID. Appreciate neurology input  -pregabalin dose was reduced to 75 mg PO TID  -please see below for DM foot ulcer management     Left 5th metatarsal base cellulitis  DM foot ulcer, B/L     -soft tissue gram stain showed many gram positive cocci occurring singly and in pairs. Culture pending  -blood cxs x 2 pending  -on IV vancomycin and zosyn  -ID and Podiatry on-board. Appreciate specialists input      Seizure    -abn EEG as stated above  -neurology started Keppra. Neurology recommend to follow-up with Dr. Carlos A Menjivar in 4-6 weeks after discharge.  Appreciate Neurology input.   -patient was advised no driving until cleared by neurologist.    DM type 2, uncontrolled    -A1C 9.3% on 2/25/2021  -cont SSI, low carb diet, accu-check  -start lantus     DM peripheral neuropathy    -cont lyrica and cymbalta     Essential HTN, uncontrolled    -resume home meds lisinopril with holding parameters  -labetaol IV prn  -VS per protocol     Chronic low back pain    -start lidocaine patch  -cont tylenol  -tramadol prn for severe pain      Tinea capitis    -start ketoconazole shampoo x 2 weeks and lamisil PO x 6 weeks.  Monitor LFTs daily    Hx of ADONIS    -per care everywhere, pt had creatinine as high as 6 in the past, otherwise, eGFR looks normal most of the time   -BMP in am      Leukocytosis, likely from DM foot ulcer, resolved    -CBC in am    Hx of polysubstance abuse    -UDS (-)      Anticipated Discharge in : pending           DVT prophylaxis: [] Lovenox                                 [] SCDs                                 [] SQ Heparin                                 [] Encourage ambulation           [] Already on Anticoagulation     Disposition:    [] Home       [] TCU       [] Rehab       [] Psych       [] SNF       [] [place]       [x] Other-Plan as above       Code Status: Full Code        Electronically signed by Corinne Blanton MD on 2/26/2021 at 12:54 PM

## 2021-02-26 NOTE — PROGRESS NOTES
Physician Progress Note      Marie Barbosa  HECTOR #:                  [id number]  :                       1965  ADMIT DATE:       2021 12:33 AM  DISCH DATE:  RESPONDING  PROVIDER #:        Efren Ventura MD        QUERY TEXT:    Stage of Chronic Kidney Disease: Please provide further specificity, if known. Clinical indicators include: ckd, creatinines, creatinine, bun  Options provided:  -- Chronic kidney disease stage 1  -- Chronic kidney disease stage 2  -- Chronic kidney disease stage 3  -- Chronic kidney disease stage 4  -- Chronic kidney disease stage 5  -- Chronic kidney disease stage 5, requiring dialysis  -- End stage renal disease  -- Other - I will add my own diagnosis  -- Disagree - Not applicable / Not valid  -- Disagree - Clinically Unable to determine / Unknown        PROVIDER RESPONSE TEXT:    The patient has chronic kidney disease stage 3.       Electronically signed by:  Efren Ventura MD 2021 12:14 PM

## 2021-02-26 NOTE — CONSULTS
Podiatric Surgery Consult    Reason for Consult:  Bilateral foot wounds  Requesting Physician:  Marcie Holder MD    CHIEF COMPLAINT:  Bilateral foot wound    HISTORY OF PRESENT ILLNESS:                The patient is a 48 y.o. male with significant past medical history of seizures, Type 2 DM who being seen at bedside with complaints of bilateral foot wounds. Patient has been dealing with these foot wounds for a long time. He has previously had a left partial 5th ray resection, right partial 1st ray amputation, and 4th partial digital amputation right foot. Patient relates that 1 month ago he began having increased pain, swelling, and purulent discharge to his left foot 5th ray stump. The incision was closed, however it re-opened. Patient followed up with Dr. Joanna Maier for this condition. Patient had a culture taken, was prescribed a course of doxycycline, and has wrapped the foot since then. Patient states that he finished his course of doxy. However, he still has residual pain and redness to this foot. Patient also relates that he has recently been dealing with a right foot ulcer, but he is more concerned about the left. He does not want to lose his feet. Patient denies any current nausea, vomiting, fever, chills, calf pain, chest pain, or shortness of breath. No other pedal complaints. Past Medical History:        Diagnosis Date    Seizures [facility])      Past Surgical History:    History reviewed. No pertinent surgical history.   Current Medications:    Current Facility-Administered Medications: sodium chloride flush 0.9 % injection 10 mL, 10 mL, Intravenous, 2 times per day  sodium chloride flush 0.9 % injection 10 mL, 10 mL, Intravenous, PRN  promethazine (PHENERGAN) tablet 12.5 mg, 12.5 mg, Oral, Q6H PRN **OR** ondansetron (ZOFRAN) injection 4 mg, 4 mg, Intravenous, Q6H PRN  polyethylene glycol (GLYCOLAX) packet 17 g, 17 g, Oral, Daily PRN  acetaminophen (TYLENOL) tablet 650 mg, 650 mg, Oral, Q6H PRN **OR** acetaminophen (TYLENOL) suppository 650 mg, 650 mg, Rectal, Q6H PRN  glucose (GLUTOSE) 40 % oral gel 15 g, 15 g, Oral, PRN  dextrose 50 % IV solution, 12.5 g, Intravenous, PRN  glucagon (rDNA) injection 1 mg, 1 mg, Intramuscular, PRN  dextrose 5 % solution, 100 mL/hr, Intravenous, PRN  insulin lispro (HUMALOG) injection vial 0-12 Units, 0-12 Units, Subcutaneous, TID WC  insulin lispro (HUMALOG) injection vial 0-6 Units, 0-6 Units, Subcutaneous, Nightly  enoxaparin (LOVENOX) injection 40 mg, 40 mg, Subcutaneous, Daily  pregabalin (LYRICA) capsule 75 mg, 75 mg, Oral, TID  DULoxetine (CYMBALTA) extended release capsule 60 mg, 60 mg, Oral, Daily  Allergies:  Patient has no known allergies. Social History:    TOBACCO:   reports that he has quit smoking. His smoking use included cigarettes. He has never used smokeless tobacco.  ETOH:   reports previous alcohol use. DRUGS:   reports previous drug use. Family History:   History reviewed. No pertinent family history. REVIEW OF SYSTEMS:    Pertinent ROS in HPI  PHYSICAL EXAM:      Vitals:    BP (!) 142/86   Pulse 84   Temp 97.9 °F (36.6 °C)   Resp 18   SpO2 96%     Exam:   Dressing intact and well maintained. No apparent strike through noted. Vascular: Dorsalis pedis and posterior tibial pulses are palpable bilaterally. Skin temperature is warm to warm from proximal tibial tuberosity to distal digits. CFT < 3 seconds to exposed digits. Edema positive over 5th metatarsal base left foot. Hair growth negative. Quality of skin diminished. Dermatologic: Patient has wounds to the bilateral feet. On left, patient has a wound at the stump site of his partial 5th ray resection. Wounds has inflammatory cyst present, and probes to bone. No purulence visualized. Some questionable malodor vs. poor pedal hygiene present. Erythema and edema present in rakesh-wound area. Hyperkeratotic wound margins. On right, patient has an ulceration sub-4th metatarsal head.  Wound is partial thickness, with granular base. Sanguinous discharge present. No surrounding erythema, edema. No purulence or malodor. Neurovascular: Protective sensation grossly diminished bilaterally. Light touch sensation diminished to level of Achilles tendon bilaterally. Musculoskeletal: Muscle strength 5/5 for all plantarflexors, dorsiflexors, and inverters. Muscle strength 4/5 for everters examined. Pain on palpation of wounds bilaterally. S/p partial 5th ray resection left, partial 1st ray resection right, partial 4th digital amputation right. Left foot      Dorsal left foot      Plantar right foot          XRAY: Pending left foot xray    LABS:    Recent Labs     02/25/21  0940 02/26/21  0451   WBC 11.8* 8.1   HGB 15.1 13.6*   HCT 46.0 42.3    220        Recent Labs     02/26/21  0451      K 4.2      CO2 27   BUN 19   CREATININE 1.2        Recent Labs     02/25/21  0055 02/25/21  0940 02/25/21  0941 02/26/21  0451   PROT 7.5  --  7.8 6.8   INR 0.97 0.99  --   --    APTT 30.3  --   --   --       No results for input(s): CKTOTAL, CKMB, CKMBINDEX, TROPONINI in the last 72 hours. Assessment  Principle  1) Cellulitis, left 5th metatarsal base  2) Diabetic foot ulcerations, bilateral    Plan  - Patient initially examined and evaluated  - WBC 8.1, afebrile  - Labs reviewed, last A1c 9.3%  - Culture taken from left 5th metatarsal wound, sent for aerobic/anaerobic microbiologic analysis  - Xrays ordered of left foot  - Clinically concerned for osteomyelitis of left 5th metatarsal base wound given residual erythema following doxy, probe to bone. - Discussed with patient that he may have residual bony infection of left 5th metatarsal base, and if this is the case, that he will need surgical resection of that bone.   - Vancomycin 1 dose pharmacy to dose  - Zosyn 3.375g q8h  - Would recommend consult to ID  - Discussed need for offloading of right foot for resolution of this ulceration  - Ordered post-op shoe for right foot  - Heel WB in post-op shoe left foot, WBAT in post-op shoe right foot  - Wounds dressed with betadine, gauze, kerlix, and ACE bandage bilaterally  - Orders placed for nursing to change dressings daily  - Podiatry will continue to follow patient    DISPO: Pending left foot xrays, culture, response to IV abx    Dr. Mika Samuel, thank you for the consultation, allowing podiatry to assist in the medical welfare of this patient. Podiatry will continue to follow this patient throughout the duration of hospitalization.    Paulette Arechiga DPM  2/26/2021 9:29 AM

## 2021-02-27 LAB
ALBUMIN SERPL-MCNC: 3.8 G/DL (ref 3.5–5.1)
ALP BLD-CCNC: 78 U/L (ref 38–126)
ALT SERPL-CCNC: 15 U/L (ref 11–66)
ANION GAP SERPL CALCULATED.3IONS-SCNC: 12 MEQ/L (ref 8–16)
AST SERPL-CCNC: 20 U/L (ref 5–40)
BASOPHILS # BLD: 0.5 %
BASOPHILS ABSOLUTE: 0 THOU/MM3 (ref 0–0.1)
BILIRUB SERPL-MCNC: 0.5 MG/DL (ref 0.3–1.2)
BUN BLDV-MCNC: 18 MG/DL (ref 7–22)
CALCIUM SERPL-MCNC: 9.3 MG/DL (ref 8.5–10.5)
CHLORIDE BLD-SCNC: 106 MEQ/L (ref 98–111)
CO2: 22 MEQ/L (ref 23–33)
CREAT SERPL-MCNC: 1.1 MG/DL (ref 0.4–1.2)
EOSINOPHIL # BLD: 1.6 %
EOSINOPHILS ABSOLUTE: 0.2 THOU/MM3 (ref 0–0.4)
ERYTHROCYTE [DISTWIDTH] IN BLOOD BY AUTOMATED COUNT: 13.6 % (ref 11.5–14.5)
ERYTHROCYTE [DISTWIDTH] IN BLOOD BY AUTOMATED COUNT: 45.2 FL (ref 35–45)
GFR SERPL CREATININE-BSD FRML MDRD: 70 ML/MIN/1.73M2
GLUCOSE BLD-MCNC: 156 MG/DL (ref 70–108)
GLUCOSE BLD-MCNC: 172 MG/DL (ref 70–108)
GLUCOSE BLD-MCNC: 173 MG/DL (ref 70–108)
GLUCOSE BLD-MCNC: 173 MG/DL (ref 70–108)
GLUCOSE BLD-MCNC: 187 MG/DL (ref 70–108)
HCT VFR BLD CALC: 43.4 % (ref 42–52)
HEMOGLOBIN: 14.2 GM/DL (ref 14–18)
IMMATURE GRANS (ABS): 0.02 THOU/MM3 (ref 0–0.07)
IMMATURE GRANULOCYTES: 0.2 %
LYMPHOCYTES # BLD: 39.2 %
LYMPHOCYTES ABSOLUTE: 3.7 THOU/MM3 (ref 1–4.8)
MCH RBC QN AUTO: 29.5 PG (ref 26–33)
MCHC RBC AUTO-ENTMCNC: 32.7 GM/DL (ref 32.2–35.5)
MCV RBC AUTO: 90.2 FL (ref 80–94)
MONOCYTES # BLD: 7.8 %
MONOCYTES ABSOLUTE: 0.7 THOU/MM3 (ref 0.4–1.3)
NUCLEATED RED BLOOD CELLS: 0 /100 WBC
PLATELET # BLD: 248 THOU/MM3 (ref 130–400)
PMV BLD AUTO: 9.9 FL (ref 9.4–12.4)
POTASSIUM REFLEX MAGNESIUM: 4.2 MEQ/L (ref 3.5–5.2)
RBC # BLD: 4.81 MILL/MM3 (ref 4.7–6.1)
SEG NEUTROPHILS: 50.7 %
SEGMENTED NEUTROPHILS ABSOLUTE COUNT: 4.8 THOU/MM3 (ref 1.8–7.7)
SODIUM BLD-SCNC: 140 MEQ/L (ref 135–145)
TOTAL PROTEIN: 7.1 G/DL (ref 6.1–8)
WBC # BLD: 9.4 THOU/MM3 (ref 4.8–10.8)

## 2021-02-27 PROCEDURE — 1200000003 HC TELEMETRY R&B

## 2021-02-27 PROCEDURE — 2580000003 HC RX 258: Performed by: INTERNAL MEDICINE

## 2021-02-27 PROCEDURE — 99232 SBSQ HOSP IP/OBS MODERATE 35: CPT | Performed by: FAMILY MEDICINE

## 2021-02-27 PROCEDURE — 6360000002 HC RX W HCPCS: Performed by: STUDENT IN AN ORGANIZED HEALTH CARE EDUCATION/TRAINING PROGRAM

## 2021-02-27 PROCEDURE — 85025 COMPLETE CBC W/AUTO DIFF WBC: CPT

## 2021-02-27 PROCEDURE — 82948 REAGENT STRIP/BLOOD GLUCOSE: CPT

## 2021-02-27 PROCEDURE — 6370000000 HC RX 637 (ALT 250 FOR IP): Performed by: PSYCHIATRY & NEUROLOGY

## 2021-02-27 PROCEDURE — 6370000000 HC RX 637 (ALT 250 FOR IP): Performed by: FAMILY MEDICINE

## 2021-02-27 PROCEDURE — 36415 COLL VENOUS BLD VENIPUNCTURE: CPT

## 2021-02-27 PROCEDURE — 2500000003 HC RX 250 WO HCPCS: Performed by: FAMILY MEDICINE

## 2021-02-27 PROCEDURE — 80053 COMPREHEN METABOLIC PANEL: CPT

## 2021-02-27 PROCEDURE — 2580000003 HC RX 258: Performed by: STUDENT IN AN ORGANIZED HEALTH CARE EDUCATION/TRAINING PROGRAM

## 2021-02-27 PROCEDURE — 6370000000 HC RX 637 (ALT 250 FOR IP): Performed by: INTERNAL MEDICINE

## 2021-02-27 PROCEDURE — 6360000002 HC RX W HCPCS: Performed by: INTERNAL MEDICINE

## 2021-02-27 RX ORDER — AMLODIPINE BESYLATE 5 MG/1
5 TABLET ORAL DAILY
Status: DISCONTINUED | OUTPATIENT
Start: 2021-02-27 | End: 2021-02-28 | Stop reason: HOSPADM

## 2021-02-27 RX ADMIN — LABETALOL 20 MG/4 ML (5 MG/ML) INTRAVENOUS SYRINGE 5 MG: at 03:06

## 2021-02-27 RX ADMIN — INSULIN LISPRO 2 UNITS: 100 INJECTION, SOLUTION INTRAVENOUS; SUBCUTANEOUS at 17:08

## 2021-02-27 RX ADMIN — ACETAMINOPHEN 650 MG: 325 TABLET ORAL at 06:07

## 2021-02-27 RX ADMIN — TRAMADOL HYDROCHLORIDE 50 MG: 50 TABLET, FILM COATED ORAL at 13:43

## 2021-02-27 RX ADMIN — TERBINAFINE 250 MG: 250 TABLET ORAL at 08:39

## 2021-02-27 RX ADMIN — AMLODIPINE BESYLATE 5 MG: 5 TABLET ORAL at 17:10

## 2021-02-27 RX ADMIN — INSULIN LISPRO 2 UNITS: 100 INJECTION, SOLUTION INTRAVENOUS; SUBCUTANEOUS at 08:43

## 2021-02-27 RX ADMIN — ACETAMINOPHEN 650 MG: 325 TABLET ORAL at 20:15

## 2021-02-27 RX ADMIN — PREGABALIN 75 MG: 75 CAPSULE ORAL at 08:39

## 2021-02-27 RX ADMIN — DULOXETINE HYDROCHLORIDE 60 MG: 60 CAPSULE, DELAYED RELEASE ORAL at 08:39

## 2021-02-27 RX ADMIN — PIPERACILLIN AND TAZOBACTAM 3375 MG: 3; .375 INJECTION, POWDER, LYOPHILIZED, FOR SOLUTION INTRAVENOUS at 21:20

## 2021-02-27 RX ADMIN — ENOXAPARIN SODIUM 40 MG: 40 INJECTION SUBCUTANEOUS at 08:39

## 2021-02-27 RX ADMIN — PIPERACILLIN AND TAZOBACTAM 3375 MG: 3; .375 INJECTION, POWDER, LYOPHILIZED, FOR SOLUTION INTRAVENOUS at 04:44

## 2021-02-27 RX ADMIN — LEVETIRACETAM 500 MG: 500 TABLET, FILM COATED ORAL at 20:15

## 2021-02-27 RX ADMIN — LEVETIRACETAM 500 MG: 500 TABLET, FILM COATED ORAL at 08:39

## 2021-02-27 RX ADMIN — PREGABALIN 75 MG: 75 CAPSULE ORAL at 13:43

## 2021-02-27 RX ADMIN — PREGABALIN 75 MG: 75 CAPSULE ORAL at 20:15

## 2021-02-27 RX ADMIN — TRAMADOL HYDROCHLORIDE 50 MG: 50 TABLET, FILM COATED ORAL at 02:45

## 2021-02-27 RX ADMIN — LISINOPRIL 40 MG: 40 TABLET ORAL at 08:39

## 2021-02-27 RX ADMIN — SODIUM CHLORIDE, PRESERVATIVE FREE 10 ML: 5 INJECTION INTRAVENOUS at 20:18

## 2021-02-27 RX ADMIN — SODIUM CHLORIDE, PRESERVATIVE FREE 10 ML: 5 INJECTION INTRAVENOUS at 08:42

## 2021-02-27 RX ADMIN — PIPERACILLIN AND TAZOBACTAM 3375 MG: 3; .375 INJECTION, POWDER, LYOPHILIZED, FOR SOLUTION INTRAVENOUS at 12:01

## 2021-02-27 RX ADMIN — INSULIN LISPRO 2 UNITS: 100 INJECTION, SOLUTION INTRAVENOUS; SUBCUTANEOUS at 12:03

## 2021-02-27 RX ADMIN — INSULIN GLARGINE 10 UNITS: 100 INJECTION, SOLUTION SUBCUTANEOUS at 08:45

## 2021-02-27 ASSESSMENT — PAIN SCALES - GENERAL
PAINLEVEL_OUTOF10: 8
PAINLEVEL_OUTOF10: 10
PAINLEVEL_OUTOF10: 10
PAINLEVEL_OUTOF10: 8
PAINLEVEL_OUTOF10: 10
PAINLEVEL_OUTOF10: 4

## 2021-02-27 ASSESSMENT — PAIN DESCRIPTION - PAIN TYPE: TYPE: CHRONIC PAIN

## 2021-02-27 ASSESSMENT — PAIN DESCRIPTION - LOCATION: LOCATION: BACK

## 2021-02-27 NOTE — PROGRESS NOTES
Progress note: Infectious diseases    Patient - Debora Raerdon,  Age - 48 y.o.    - 1965      Room Number - 6K-23/023-A   MRN -  [id number]   Acct # - [de-identified]  Date of Admission -  2021 12:33 AM    SUBJECTIVE:   No new issues. OBJECTIVE   VITALS    height is 6' 1\" (1.854 m) and weight is 265 lb 12.8 oz (120.6 kg). His oral temperature is 98.5 °F (36.9 °C). His blood pressure is 178/103 (abnormal) and his pulse is 89. His respiration is 18 and oxygen saturation is 98%. Wt Readings from Last 3 Encounters:   21 265 lb 12.8 oz (120.6 kg)       I/O (24 Hours)    Intake/Output Summary (Last 24 hours) at 2021 1524  Last data filed at 2021 1344  Gross per 24 hour   Intake --   Output 1850 ml   Net -1850 ml       General Appearance  Awake, alert, oriented,  not  In acute distress  HEENT - normocephalic, atraumatic, slightly  conjunctiva,  anicteric sclera  Neck - Supple, no mass  Lungs -  Bilateral   air entry, no rhonchi, no wheeze  Cardiovascular - Heart sounds are normal.     Abdomen - soft, not distended, nontender,   Neurologic -oriented  Skin - No bruising or bleeding  Extremities - dressed feet wound.     MEDICATIONS:      amLODIPine  5 mg Oral Daily    piperacillin-tazobactam  3,375 mg Intravenous Q8H    levETIRAcetam  500 mg Oral BID    lidocaine  1 patch Transdermal Daily    ketoconazole   Topical Q3 Days    terbinafine  250 mg Oral Daily    insulin glargine  10 Units Subcutaneous Daily with breakfast    lisinopril  40 mg Oral Daily    sodium chloride flush  10 mL Intravenous 2 times per day    insulin lispro  0-12 Units Subcutaneous TID WC    insulin lispro  0-6 Units Subcutaneous Nightly    enoxaparin  40 mg Subcutaneous Daily    pregabalin  75 mg Oral TID    DULoxetine  60 mg Oral Daily      dextrose       traMADol, labetalol, sodium chloride flush, promethazine **OR** ondansetron, polyethylene glycol, acetaminophen **OR** acetaminophen, glucose, dextrose, glucagon (rDNA), dextrose      LABS:     CBC:   Recent Labs     02/25/21  0940 02/26/21  0451 02/27/21  0518   WBC 11.8* 8.1 9.4   HGB 15.1 13.6* 14.2    220 248     BMP:    Recent Labs     02/25/21  0941 02/26/21  0451 02/27/21  0518    141 140   K 3.8 4.2 4.2    107 106   CO2 25 27 22*   BUN 14 19 18   CREATININE 0.9 1.2 1.1   GLUCOSE 117* 138* 156*     Calcium:  Recent Labs     02/27/21  0518   CALCIUM 9.3    Glucose:  Recent Labs     02/26/21 2027 02/27/21  0621 02/27/21  1052   POCGLU 75 172* 187*     HgbA1C:   Recent Labs     02/25/21  0941   LABA1C 9.3*     INR:   Recent Labs     02/25/21  0055 02/25/21  0940   INR 0.97 0.99     Hepatic:   Recent Labs     02/25/21  0055 02/25/21  0941 02/26/21  0451 02/27/21  0518   ALKPHOS 87 88 75 78   ALT 16 17 14 15   AST 20 20 14 20   PROT 7.5 7.8 6.8 7.1   BILITOT 0.4 0.5 0.4 0.5   BILIDIR <0.2  --   --   --    LABALBU 4.2 4.4 3.8 3.8        CULTURES:   UA:   Recent Labs     02/25/21  0245   PHUR 5.0   COLORU YELLOW   PROTEINU NEGATIVE   BLOODU NEGATIVE   NITRU NEGATIVE   GLUCOSEU NEGATIVE   BILIRUBINUR NEGATIVE   UROBILINOGEN 0.2   KETUA TRACE*     Micro:   Lab Results   Component Value Date    BC No growth-preliminary  02/26/2021        Problem list of patient:     Patient Active Problem List   Diagnosis Code    Acute encephalopathy G93.40    Cellulitis of fifth toe, left L03.032    Diabetic foot ulcer associated with type 2 diabetes mellitus ([facility] [facility] E11.621, L97.509    Seizure ([facility] [address] R56.9    DM type 2, uncontrolled, with neuropathy ([facility] [address] E11.40, E11.65    Essential hypertension I10    Chronic low back pain M54.5, G89.29    Tinea capitis B35.0    Leukocytosis D72.829         ASSESSMENT/PLAN   Non healing left foot diabetic wound with cellulites  Diabetes with neuropathy  Will narrow antibiotic to rocephin.will discharge with augmentin if podiatry are not planning surgery. He follows at [address] [place] [address] with Dr Trinity Toth.  Advised to schedule follow up      Javi Soria MD, FACP 2/27/2021 3:24 PM

## 2021-02-27 NOTE — PROGRESS NOTES
Hospitalist Progress Note    Patient:  Audrey Waldrop      Unit/Bed:6K-23/023-A    YOB: 1965    MRN: [id number]       Acct: [de-identified]     PCP: Darlyn Edmonds MD    Date of Admission: 2/25/2021    Chief Complaint: Follow-up for confusion    Hospital Course:     Per H&P note:    \"Patient is a 27-year-old male with a medical history of insulin-dependent type 2 diabetes with history of peripheral neuropathy and amputation of multiple phalanges, hypertension, CKD, seizure disorder, reported polysubstance use disorder. Patient presented to the ER yesterday with confusion. Labs were fairly unremarkable and there is no evidence of any illicit substances in his urine. Patient would answer some questions but then would be nonsensical at other times. He was found down at a sober house. He was given IV fluids and Ativan and then admitted to the hospitalist service. Per my interview the patient is more oriented at this time. He knows he is at a hospital and he knows the month and year. He says that this has happened to him before where he presents to the hospital and is unsure why he is there. When I try to obtain further medical history the patient is a very poor historian. When I prompted him for certain illnesses he is able to elaborate some but is not able to provide much information. He acknowledges that he is a diabetic and takes insulin. He states that he does have a history of seizure disorder but then states he was never on any medication. Chart review in care everywhere demonstrates he has been admitted before for alcohol withdrawal seizures and has been having issues with chronic back pain for which she was on a Subutex patch and was following pain management. He denies any recent alcohol or illicit drug use. He does not recall why he was found down. He states that when he has a seizure is generally a general tonic-clonic seizure. He states his last seizure was a few months ago. He otherwise denies any chest pain, shortness of breath, fevers, chills, diarrhea, nausea, vomiting, headache, vision changes. \"      Subjective:     Patient seen and examined. Pt denies HA, blurry vision, chest pain, sob, N/V, abd pain, diarrhea, UTI symptoms. Pt is c/o low back pain today, 8/10, non-radiating. Denies leg weakness/numbness, urinary/stool incontinence. Medications:  Reviewed    Infusion Medications    dextrose       Scheduled Medications    amLODIPine  5 mg Oral Daily    piperacillin-tazobactam  3,375 mg Intravenous Q8H    levETIRAcetam  500 mg Oral BID    lidocaine  1 patch Transdermal Daily    ketoconazole   Topical Q3 Days    terbinafine  250 mg Oral Daily    insulin glargine  10 Units Subcutaneous Daily with breakfast    lisinopril  40 mg Oral Daily    sodium chloride flush  10 mL Intravenous 2 times per day    insulin lispro  0-12 Units Subcutaneous TID WC    insulin lispro  0-6 Units Subcutaneous Nightly    enoxaparin  40 mg Subcutaneous Daily    pregabalin  75 mg Oral TID    DULoxetine  60 mg Oral Daily     PRN Meds: traMADol, labetalol, sodium chloride flush, promethazine **OR** ondansetron, polyethylene glycol, acetaminophen **OR** acetaminophen, glucose, dextrose, glucagon (rDNA), dextrose      Intake/Output Summary (Last 24 hours) at 2/27/2021 1341  Last data filed at 2/27/2021 0444  Gross per 24 hour   Intake --   Output 1000 ml   Net -1000 ml       Diet:  DIET CARB CONTROL; Exam:  BP (!) 178/103   Pulse 89   Temp 98.5 °F (36.9 °C) (Oral)   Resp 18   Ht 6' 1\" (1.854 m)   Wt 265 lb 12.8 oz (120.6 kg)   SpO2 98%   BMI 35.07 kg/m²     General appearance: alert, not in acute distress   HEENT: Pupils equal, round, and reactive to light. Conjunctivae clear. Neck: Supple, with full range of motion. No jugular venous distention. Trachea midline. Respiratory:  Normal respiratory effort.  Clear to auscultation, bilaterally without Rales/Wheezes/Rhonchi. Cardiovascular: normal rate, regular rhythm with normal S1/S2 without murmurs, rubs or gallops. Abdomen: Soft, non-tender, non-distended with normal bowel sounds. Musculoskeletal: passive and active ROM x 4 extremities. (+) lumbar tenderness  Skin: (+) scattered scaly, erythematous/pinkish on base rashes on the scalp  Neurological exam reveals alert, oriented, normal speech, no focal findings or movement disorder noted. Exam of extremities: feet wrapped with ace wrap      Labs:   Recent Labs     02/25/21  0940 02/26/21  0451 02/27/21  0518   WBC 11.8* 8.1 9.4   HGB 15.1 13.6* 14.2   HCT 46.0 42.3 43.4    220 248     Recent Labs     02/25/21  0941 02/26/21  0451 02/27/21  0518    141 140   K 3.8 4.2 4.2    107 106   CO2 25 27 22*   BUN 14 19 18   CREATININE 0.9 1.2 1.1   CALCIUM 9.8 9.2 9.3     Recent Labs     02/25/21  0055 02/25/21  0941 02/26/21  0451 02/27/21  0518   AST 20 20 14 20   ALT 16 17 14 15   BILIDIR <0.2  --   --   --    BILITOT 0.4 0.5 0.4 0.5   ALKPHOS 87 88 75 78     Recent Labs     02/25/21  0055 02/25/21  0940   INR 0.97 0.99     No results for input(s): Ayleen Delgadillo in the last 72 hours. Urinalysis:      Lab Results   Component Value Date    NITRU NEGATIVE 02/25/2021    BLOODU NEGATIVE 02/25/2021    GLUCOSEU NEGATIVE 02/25/2021       Radiology:  XR FOOT LEFT (MIN 3 VIEWS)   Final Result    Amputation of the fifth digit with heterotopic ossification without definite evidence of osteomyelitis to limits of the sensitivity of this examination. **This report has been created using voice recognition software. It may contain minor errors which are inherent in voice recognition technology. **      Final report electronically signed by Dr. Niurka Mario MD on 2/26/2021 10:40 AM      XR CHEST PORTABLE   Final Result   Impression:   1. No acute cardiopulmonary disease.    2.  Poor inspiratory effort accentuating the heart size and pulmonary    vessels. This document has been electronically signed by: Elva Hodgkins, MD on    02/25/2021 01:25 AM      CT HEAD WO CONTRAST (CODE STROKE)   Final Result   Impression:   1. No CT evidence of acute intracranial pathology. This document has been electronically signed by: Elva Hodgkins, MD on    02/25/2021 12:58 AM      All CTs at this facility use dose modulation techniques and iterative    reconstructions, and/or weight-based dosing   when appropriate to reduce radiation to a low as reasonably achievable. Diet: DIET CARB CONTROL; Assessment/Plan:      Acute encephalopathy, exact etiology unclear, questionable medication-induced vs seizure vs infection (diabetic foot ulcer), resolved     -pt is alert and oriented x3 and answers questions properly   -per neurology note, EEG is abnormal for bifrontal high-voltage delta activity  -UA (-) for infection  -UDS (-)   -neurology started keppra 500 mg PO BID on 2/26/2021. Appreciate neurology input  -pregabalin dose was reduced to 75 mg PO TID  -please see below for DM foot ulcer management     Left 5th metatarsal base cellulitis  DM foot ulcer, B/L     -soft tissue gram stain showed many gram positive cocci occurring singly and in pairs. Preliminary report of wound culture came back heavy growth of GBS  -blood cxs x 2 pending, no growth so far  -on IV vancomycin and zosyn, started 2/26/2021  -ID and Podiatry on-board. Appreciate specialists input      Seizure    -abn EEG as stated above, this was discussed to pt and his wife ( at bedside) on 2/26/2021, discussed again to pt on 2/27/2021 as he was asking about the EEG report again   -neurology started Keppra. Neurology recommend to follow-up with Dr. Skinny Ambrose in 4-6 weeks after discharge.  Appreciate Neurology input.   -patient was advised no driving until cleared by neurologist.    DM type 2, uncontrolled    -A1C 9.3% on 2/25/2021  -cont SSI, low carb diet, accu-check  -started lantus on 2/26/2021. BG on accu-check acceptable      DM peripheral neuropathy    -cont lyrica and cymbalta     Essential HTN, uncontrolled    -resume home meds lisinopril with holding parameters  -labetaol IV prn  -VS per protocol     Chronic low back pain    -cont lidocaine patch  -cont tylenol  -tramadol prn for severe pain      Tinea capitis    -cont ketoconazole shampoo x 2 weeks and lamisil PO x 6 weeks ( start date 2/26/2021).  Monitor LFTs daily    Hx of ADONIS    -per care everywhere, pt had creatinine as high as 6 in the past, otherwise, eGFR looks normal most of the time   -creatinine 1.1 today  -BMP in am      Mild non-AGMA    -BMP in am     Leukocytosis, likely from DM foot ulcer, resolved    -CBC in am    Hx of polysubstance abuse    -UDS (-)      Anticipated Discharge in : pending           DVT prophylaxis: [] Lovenox                                 [] SCDs                                 [] SQ Heparin                                 [] Encourage ambulation           [] Already on Anticoagulation     Disposition:    [] Home       [] TCU       [] Rehab       [] Psych       [] SNF       [] [place]       [x] Other-Plan as above       Code Status: Full Code        Electronically signed by Melissa Cuevas MD on 2/27/2021 at 1:41 PM

## 2021-02-27 NOTE — PROGRESS NOTES
Podiatric Progress Note  Rylee Bender  Subjective :     2/27/2021:  Patient seen at bedside today on behalf of Dr. Linh Chanel. Patient appeared pleasant, was oriented to person, place and time and in no acute distress. Patient was inquisitive about what the plan would be regarding the left foot ulcer. He denies any resting pain to either of his feet. Patient denies any N/V/F/C/SOB or CP. Patient has no further pedal concerns at this point in time. HPI: The patient is a 48 y.o. male with significant past medical history of seizures, Type 2 DM who being seen at bedside with complaints of bilateral foot wounds. Patient has been dealing with these foot wounds for a long time. He has previously had a left partial 5th ray resection, right partial 1st ray amputation, and 4th partial digital amputation right foot. Patient relates that 1 month ago he began having increased pain, swelling, and purulent discharge to his left foot 5th ray stump. The incision was closed, however it re-opened. Patient followed up with Dr. Linh Chanel for this condition. Patient had a culture taken, was prescribed a course of doxycycline, and has wrapped the foot since then. Patient states that he finished his course of doxy. However, he still has residual pain and redness to this foot. Patient also relates that he has recently been dealing with a right foot ulcer, but he is more concerned about the left. He does not want to lose his feet. Patient denies any current nausea, vomiting, fever, chills, calf pain, chest pain, or shortness of breath. No other pedal complaints.      Current Medications:    Current Facility-Administered Medications: piperacillin-tazobactam (ZOSYN) 3,375 mg in dextrose 5 % 50 mL IVPB extended infusion (mini-bag), 3,375 mg, Intravenous, Q8H  levETIRAcetam (KEPPRA) tablet 500 mg, 500 mg, Oral, BID  lidocaine 4 % external patch 1 patch, 1 patch, Transdermal, Daily  ketoconazole (NIZORAL) 2 % shampoo, , Topical, Q3 Days  terbinafine (LAMISIL) tablet 250 mg, 250 mg, Oral, Daily  traMADol (ULTRAM) tablet 50 mg, 50 mg, Oral, Q8H PRN  insulin glargine (LANTUS) injection vial 10 Units, 10 Units, Subcutaneous, Daily with breakfast  lisinopril (PRINIVIL;ZESTRIL) tablet 40 mg, 40 mg, Oral, Daily  labetalol (NORMODYNE;TRANDATE) injection syringe 5 mg, 5 mg, Intravenous, Q4H PRN  sodium chloride flush 0.9 % injection 10 mL, 10 mL, Intravenous, 2 times per day  sodium chloride flush 0.9 % injection 10 mL, 10 mL, Intravenous, PRN  promethazine (PHENERGAN) tablet 12.5 mg, 12.5 mg, Oral, Q6H PRN **OR** ondansetron (ZOFRAN) injection 4 mg, 4 mg, Intravenous, Q6H PRN  polyethylene glycol (GLYCOLAX) packet 17 g, 17 g, Oral, Daily PRN  acetaminophen (TYLENOL) tablet 650 mg, 650 mg, Oral, Q6H PRN **OR** acetaminophen (TYLENOL) suppository 650 mg, 650 mg, Rectal, Q6H PRN  glucose (GLUTOSE) 40 % oral gel 15 g, 15 g, Oral, PRN  dextrose 50 % IV solution, 12.5 g, Intravenous, PRN  glucagon (rDNA) injection 1 mg, 1 mg, Intramuscular, PRN  dextrose 5 % solution, 100 mL/hr, Intravenous, PRN  insulin lispro (HUMALOG) injection vial 0-12 Units, 0-12 Units, Subcutaneous, TID WC  insulin lispro (HUMALOG) injection vial 0-6 Units, 0-6 Units, Subcutaneous, Nightly  enoxaparin (LOVENOX) injection 40 mg, 40 mg, Subcutaneous, Daily  pregabalin (LYRICA) capsule 75 mg, 75 mg, Oral, TID  DULoxetine (CYMBALTA) extended release capsule 60 mg, 60 mg, Oral, Daily    Objective     BP (!) 170/98   Pulse 88   Temp 97.5 °F (36.4 °C) (Oral)   Resp 18   Ht 6' 1\" (1.854 m)   Wt 265 lb 12.8 oz (120.6 kg)   SpO2 97%   BMI 35.07 kg/m²      I/O:    Intake/Output Summary (Last 24 hours) at 2/27/2021 1152  Last data filed at 2/27/2021 0444  Gross per 24 hour   Intake --   Output 1000 ml   Net -1000 ml              Wt Readings from Last 3 Encounters:   02/26/21 265 lb 12.8 oz (120.6 kg)       LABS:    Recent Labs     02/26/21  0451 02/27/21  0518   WBC 8.1 9.4   HGB 13.6* 14.2   HCT 42.3 43.4    248        Recent Labs     02/27/21  0518      K 4.2      CO2 22*   BUN 18   CREATININE 1.1        Recent Labs     02/25/21  0055 02/25/21  0940 02/25/21  0940 02/26/21  0451 02/27/21  0518   PROT 7.5  --    < > 6.8 7.1   INR 0.97 0.99  --   --   --    APTT 30.3  --   --   --   --     < > = values in this interval not displayed. No results for input(s): CKTOTAL, CKMB, CKMBINDEX, TROPONINI in the last 72 hours. Focused Lower Extremity Examination:    Vitals:    BP (!) 170/98   Pulse 88   Temp 97.5 °F (36.4 °C) (Oral)   Resp 18   Ht 6' 1\" (1.854 m)   Wt 265 lb 12.8 oz (120.6 kg)   SpO2 97%   BMI 35.07 kg/m²      Dressing intact and well maintained. No apparent strike through noted. Vascular: Dorsalis pedis and posterior tibial pulses are palpable bilaterally. Skin temperature is warm to warm from proximal tibial tuberosity to distal digits. CFT < 3 seconds to exposed digits. Edema positive over 5th metatarsal base left foot. Hair growth negative. Quality of skin diminished.      Dermatologic: Patient has wounds to the bilateral feet. On left, patient has a wound at the stump site of his partial 5th ray resection. Wound has inflammatory cyst present, and probes to bone. No purulence visualized. No malodor noted. Erythema and edema present in rakesh-wound area; improving since start of IV antibiotics. Hyperkeratotic wound margins.     On right, patient has an ulceration sub-4th metatarsal head. Wound is partial thickness, with granular base. Mild sanguinous discharge present. No surrounding erythema, edema. No purulence or malodor noted.      Neurovascular: Protective sensation grossly diminished to the level of mid tibia bilaterally. Light touch sensation diminished to level of Achilles tendon bilaterally.     Musculoskeletal: Muscle strength 5/5 for all plantarflexors, dorsiflexors, and inverters. Muscle strength 4/5 for everters examined.  Pain on palpation of wounds bilaterally. S/p partial 5th ray resection left, partial 1st ray resection right, partial 4th digital amputation right. IMAGING:  XR, LEFT FOOT  Impression    Amputation of the fifth digit with heterotopic ossification without definite evidence of osteomyelitis to limits of the sensitivity of this examination.                   **This report has been created using voice recognition software. It may contain minor errors which are inherent in voice recognition technology. **       Final report electronically signed by Dr. Sophy Peterson MD on 2/26/2021 10:40 AM       ASSESSMENT: Pt. is a 48 y.o. male with:  Principle  1) Cellulitis, left 5th metatarsal base  2) Diabetic foot ulcerations, bilateral    Chronic  Patient Active Problem List   Diagnosis    Acute encephalopathy    Cellulitis of fifth toe, left    Diabetic foot ulcer associated with type 2 diabetes mellitus ([address]    Seizure ([address]    DM type 2, uncontrolled, with neuropathy ([address]    Essential hypertension    Chronic low back pain    Tinea capitis    Leukocytosis       PLAN:   1) Cellulitis, left 5th metatarsal base  2) Diabetic foot ulcerations, bilateral  - Patient initially examined and evaluated  - WBC 9.4; patient afebrile  - Labs reviewed, last A1c 9.3%  - Reviewed radiographs; impression above  - Preliminary cultures: No segmented neutrophils observed. Rare epithelial cells observed. Many gram positive cocci occurring singly and in pairs.  - Clinically concerned for osteomyelitis of left 5th metatarsal base wound given residual erythema following doxycycline, probe to bone. - Discussed with patient that he has extensive heterotopic ossification on the plantar lateral aspect of the base of the left metatarsal.  The ulcer likely keeps forming due to the pressure from the heterotopic ossification.   Discussed with patient that resection of heterotopic bone may help long-term closure of the ulcer to the lateral aspect of the left foot. - Discussed with patient that due to the cocked up nature of the right fourth digit, he has excessive pressure on the plantar aspect of the right fourth metatarsal head.   Discussed with patient that surgical resection of the metatarsal head or complete removal of metatarsal head may help relieve the pressure and help the ulcer from reoccurring.  - Vancomycin 1 dose pharmacy to dose  - Continue with Zosyn 3.375g q8h  - Discussed need for offloading of right foot for resolution of this ulceration  - Ordered post-op shoe for right foot  - Heel WB in post-op shoe left foot, WBAT in post-op shoe right foot  - Wounds dressed with betadine, gauze, kerlix, and ACE bandage bilaterally  - Orders placed for nursing to change dressings daily  - Podiatry will continue to follow patient     DISPO: Pending left foot xrays, culture, response to IV abx    Junito Kessler DPM  Podiatric Surgical Resident  2/27/2021   11:52 AM

## 2021-02-28 VITALS
HEART RATE: 95 BPM | WEIGHT: 265.8 LBS | TEMPERATURE: 98 F | OXYGEN SATURATION: 96 % | SYSTOLIC BLOOD PRESSURE: 124 MMHG | RESPIRATION RATE: 18 BRPM | HEIGHT: 73 IN | DIASTOLIC BLOOD PRESSURE: 79 MMHG | BODY MASS INDEX: 35.23 KG/M2

## 2021-02-28 LAB
ALBUMIN SERPL-MCNC: 4 G/DL (ref 3.5–5.1)
ALP BLD-CCNC: 78 U/L (ref 38–126)
ALT SERPL-CCNC: 16 U/L (ref 11–66)
ANION GAP SERPL CALCULATED.3IONS-SCNC: 9 MEQ/L (ref 8–16)
AST SERPL-CCNC: 15 U/L (ref 5–40)
BASOPHILS # BLD: 1 %
BASOPHILS ABSOLUTE: 0.1 THOU/MM3 (ref 0–0.1)
BILIRUB SERPL-MCNC: 0.6 MG/DL (ref 0.3–1.2)
BUN BLDV-MCNC: 13 MG/DL (ref 7–22)
CALCIUM SERPL-MCNC: 9.5 MG/DL (ref 8.5–10.5)
CHLORIDE BLD-SCNC: 102 MEQ/L (ref 98–111)
CO2: 29 MEQ/L (ref 23–33)
CREAT SERPL-MCNC: 1 MG/DL (ref 0.4–1.2)
EOSINOPHIL # BLD: 2.2 %
EOSINOPHILS ABSOLUTE: 0.2 THOU/MM3 (ref 0–0.4)
ERYTHROCYTE [DISTWIDTH] IN BLOOD BY AUTOMATED COUNT: 13.8 % (ref 11.5–14.5)
ERYTHROCYTE [DISTWIDTH] IN BLOOD BY AUTOMATED COUNT: 44.6 FL (ref 35–45)
GFR SERPL CREATININE-BSD FRML MDRD: 78 ML/MIN/1.73M2
GLUCOSE BLD-MCNC: 150 MG/DL (ref 70–108)
GLUCOSE BLD-MCNC: 150 MG/DL (ref 70–108)
GLUCOSE BLD-MCNC: 155 MG/DL (ref 70–108)
HCT VFR BLD CALC: 43.9 % (ref 42–52)
HEMOGLOBIN: 14.7 GM/DL (ref 14–18)
IMMATURE GRANS (ABS): 0.01 THOU/MM3 (ref 0–0.07)
IMMATURE GRANULOCYTES: 0.1 %
LYMPHOCYTES # BLD: 49.3 %
LYMPHOCYTES ABSOLUTE: 3.4 THOU/MM3 (ref 1–4.8)
MCH RBC QN AUTO: 29.8 PG (ref 26–33)
MCHC RBC AUTO-ENTMCNC: 33.5 GM/DL (ref 32.2–35.5)
MCV RBC AUTO: 89 FL (ref 80–94)
MONOCYTES # BLD: 8.2 %
MONOCYTES ABSOLUTE: 0.6 THOU/MM3 (ref 0.4–1.3)
NUCLEATED RED BLOOD CELLS: 0 /100 WBC
PLATELET # BLD: 236 THOU/MM3 (ref 130–400)
PMV BLD AUTO: 9.5 FL (ref 9.4–12.4)
POTASSIUM REFLEX MAGNESIUM: 4.4 MEQ/L (ref 3.5–5.2)
RBC # BLD: 4.93 MILL/MM3 (ref 4.7–6.1)
SEG NEUTROPHILS: 39.2 %
SEGMENTED NEUTROPHILS ABSOLUTE COUNT: 2.7 THOU/MM3 (ref 1.8–7.7)
SODIUM BLD-SCNC: 140 MEQ/L (ref 135–145)
TOTAL PROTEIN: 7.4 G/DL (ref 6.1–8)
WBC # BLD: 6.9 THOU/MM3 (ref 4.8–10.8)

## 2021-02-28 PROCEDURE — 99239 HOSP IP/OBS DSCHRG MGMT >30: CPT | Performed by: FAMILY MEDICINE

## 2021-02-28 PROCEDURE — 36415 COLL VENOUS BLD VENIPUNCTURE: CPT

## 2021-02-28 PROCEDURE — 82948 REAGENT STRIP/BLOOD GLUCOSE: CPT

## 2021-02-28 PROCEDURE — 6370000000 HC RX 637 (ALT 250 FOR IP): Performed by: PSYCHIATRY & NEUROLOGY

## 2021-02-28 PROCEDURE — 6370000000 HC RX 637 (ALT 250 FOR IP): Performed by: INTERNAL MEDICINE

## 2021-02-28 PROCEDURE — 2580000003 HC RX 258: Performed by: STUDENT IN AN ORGANIZED HEALTH CARE EDUCATION/TRAINING PROGRAM

## 2021-02-28 PROCEDURE — 6370000000 HC RX 637 (ALT 250 FOR IP): Performed by: FAMILY MEDICINE

## 2021-02-28 PROCEDURE — 80053 COMPREHEN METABOLIC PANEL: CPT

## 2021-02-28 PROCEDURE — 6360000002 HC RX W HCPCS: Performed by: STUDENT IN AN ORGANIZED HEALTH CARE EDUCATION/TRAINING PROGRAM

## 2021-02-28 PROCEDURE — 85025 COMPLETE CBC W/AUTO DIFF WBC: CPT

## 2021-02-28 PROCEDURE — 94760 N-INVAS EAR/PLS OXIMETRY 1: CPT

## 2021-02-28 PROCEDURE — 2580000003 HC RX 258: Performed by: INTERNAL MEDICINE

## 2021-02-28 PROCEDURE — 6360000002 HC RX W HCPCS: Performed by: INTERNAL MEDICINE

## 2021-02-28 RX ORDER — LEVETIRACETAM 500 MG/1
500 TABLET ORAL 2 TIMES DAILY
Qty: 90 TABLET | Refills: 0 | Status: ON HOLD | OUTPATIENT
Start: 2021-02-28 | End: 2021-12-14

## 2021-02-28 RX ORDER — AMLODIPINE BESYLATE 5 MG/1
5 TABLET ORAL DAILY
Qty: 30 TABLET | Refills: 0 | Status: ON HOLD | OUTPATIENT
Start: 2021-03-01 | End: 2021-12-14

## 2021-02-28 RX ORDER — TERBINAFINE HYDROCHLORIDE 250 MG/1
250 TABLET ORAL DAILY
Qty: 39 TABLET | Refills: 0 | Status: SHIPPED | OUTPATIENT
Start: 2021-03-01 | End: 2021-04-09

## 2021-02-28 RX ORDER — PREGABALIN 75 MG/1
75 CAPSULE ORAL 3 TIMES DAILY
Qty: 90 CAPSULE | Refills: 0
Start: 2021-02-28 | End: 2022-08-24

## 2021-02-28 RX ORDER — KETOCONAZOLE 20 MG/ML
SHAMPOO TOPICAL
Qty: 1 BOTTLE | Refills: 0 | Status: SHIPPED | OUTPATIENT
Start: 2021-03-01 | End: 2021-04-08

## 2021-02-28 RX ORDER — AMOXICILLIN AND CLAVULANATE POTASSIUM 875; 125 MG/1; MG/1
1 TABLET, FILM COATED ORAL 2 TIMES DAILY
Qty: 28 TABLET | Refills: 0 | Status: SHIPPED | OUTPATIENT
Start: 2021-02-28 | End: 2021-03-14

## 2021-02-28 RX ORDER — LISINOPRIL 40 MG/1
40 TABLET ORAL DAILY
Qty: 30 TABLET | Refills: 0 | Status: SHIPPED | OUTPATIENT
Start: 2021-03-01 | End: 2021-04-08

## 2021-02-28 RX ADMIN — PREGABALIN 75 MG: 75 CAPSULE ORAL at 14:27

## 2021-02-28 RX ADMIN — TERBINAFINE 250 MG: 250 TABLET ORAL at 08:45

## 2021-02-28 RX ADMIN — PIPERACILLIN AND TAZOBACTAM 3375 MG: 3; .375 INJECTION, POWDER, LYOPHILIZED, FOR SOLUTION INTRAVENOUS at 03:57

## 2021-02-28 RX ADMIN — INSULIN LISPRO 2 UNITS: 100 INJECTION, SOLUTION INTRAVENOUS; SUBCUTANEOUS at 11:59

## 2021-02-28 RX ADMIN — PIPERACILLIN AND TAZOBACTAM 3375 MG: 3; .375 INJECTION, POWDER, LYOPHILIZED, FOR SOLUTION INTRAVENOUS at 11:59

## 2021-02-28 RX ADMIN — TRAMADOL HYDROCHLORIDE 50 MG: 50 TABLET, FILM COATED ORAL at 00:15

## 2021-02-28 RX ADMIN — ENOXAPARIN SODIUM 40 MG: 40 INJECTION SUBCUTANEOUS at 08:46

## 2021-02-28 RX ADMIN — LEVETIRACETAM 500 MG: 500 TABLET, FILM COATED ORAL at 08:45

## 2021-02-28 RX ADMIN — DULOXETINE HYDROCHLORIDE 60 MG: 60 CAPSULE, DELAYED RELEASE ORAL at 08:45

## 2021-02-28 RX ADMIN — LISINOPRIL 40 MG: 40 TABLET ORAL at 08:45

## 2021-02-28 RX ADMIN — INSULIN LISPRO 2 UNITS: 100 INJECTION, SOLUTION INTRAVENOUS; SUBCUTANEOUS at 08:46

## 2021-02-28 RX ADMIN — INSULIN GLARGINE 10 UNITS: 100 INJECTION, SOLUTION SUBCUTANEOUS at 08:49

## 2021-02-28 RX ADMIN — SODIUM CHLORIDE, PRESERVATIVE FREE 10 ML: 5 INJECTION INTRAVENOUS at 08:46

## 2021-02-28 RX ADMIN — PREGABALIN 75 MG: 75 CAPSULE ORAL at 08:45

## 2021-02-28 RX ADMIN — AMLODIPINE BESYLATE 5 MG: 5 TABLET ORAL at 08:45

## 2021-02-28 ASSESSMENT — PAIN SCALES - GENERAL: PAINLEVEL_OUTOF10: 0

## 2021-02-28 NOTE — PROGRESS NOTES
Discharge teaching and instructions for diagnosis/procedure of acute encephalopathy completed with patient using teachback method. AVS reviewed. Patient voiced understanding regarding prescriptions, follow up appointments, and care of self at home. Discharged in a wheelchair to  home with support per family.

## 2021-02-28 NOTE — DISCHARGE SUMMARY
Hospital Medicine Discharge Summary      Patient Identification:   Jaycee Evans   : 1965  MRN: [id number]   Account: [de-identified]      Patient's PCP: Vicente Gutierrez MD    Admit Date: 2021     Discharge Date:   2021     Admitting Physician: Guzman Burt MD     Discharge Physician:  Melissa Cuevas MD     Discharge Diagnoses with hospital course:    Per H&P note:     \"Patient is a 58-year-old male with a medical history of insulin-dependent type 2 diabetes with history of peripheral neuropathy and amputation of multiple phalanges, hypertension, CKD, seizure disorder, reported polysubstance use disorder.  Patient presented to the ER yesterday with confusion.  Labs were fairly unremarkable and there is no evidence of any illicit substances in his urine.  Patient would answer some questions but then would be nonsensical at other times. [facility] was found down at a sober house. [facility] was given IV fluids and Ativan and then admitted to the hospitalist service.  Per my interview the patient is more oriented at this time. [facility] knows he is at a hospital and he knows the month and year. [facility] says that this has happened to him before where he presents to the hospital and is unsure why he is there.  When I try to obtain further medical history the patient is a very poor historian.  When I prompted him for certain illnesses he is able to elaborate some but is not able to provide much information. [facility] acknowledges that he is a diabetic and takes insulin.  He states that he does have a history of seizure disorder but then states he was never on any medication.  Chart review in care everywhere demonstrates he has been admitted before for alcohol withdrawal seizures and has been having issues with chronic back pain for which she was on a Subutex patch and was following pain management.  He denies any recent alcohol or illicit drug use.  He does not recall why he was found down. [facility] states that when he has a seizure is generally a general tonic-clonic seizure. Kathy David states his last seizure was a few months ago. Kathy David otherwise denies any chest pain, shortness of breath, fevers, chills, diarrhea, nausea, vomiting, headache, vision changes. \"    Please see below for details of hospital course:    Acute encephalopathy, exact etiology unclear, questionable medication-induced vs seizure vs infection (diabetic foot ulcer), resolved      -pt is alert and oriented x3 and answers questions properly   -per neurology note, EEG is abnormal for bifrontal high-voltage delta activity  -UA (-) for infection  -UDS (-)   -neurology started keppra 500 mg PO BID on 2/26/2021. Appreciate neurology input  -pregabalin dose was reduced to 75 mg PO TID  -please see below for DM foot ulcer management      Left 5th metatarsal base cellulitis  DM foot ulcer, B/L      -soft tissue gram stain showed many gram positive cocci occurring singly and in pairs. Preliminary report of wound culture came back heavy growth of GBS, also grew Enterococcus species, and heavy growth of Staphylococcus aureus   -blood cxs x 2 pending, no growth so far  -on IV vancomycin and zosyn, started 2/26/2021. ID recommend Augmentin PO BID x 14 days   -ID and Podiatry on-board. Appreciate specialists input       Seizure     -abn EEG as stated above, this was discussed to pt and his wife ( at bedside) on 2/26/2021, discussed again to pt on 2/27/2021 as he was asking about the EEG report again   -neurology started Keppra. Neurology recommend to follow-up with Dr. Sharmila Herndon in 4-6 weeks after discharge. Appreciate Neurology input.   -patient was advised no driving until cleared by neurologist.     DM type 2, uncontrolled     -A1C 9.3% on 2/25/2021  -cont SSI, low carb diet, accu-check  -started lantus on 2/26/2021. BG on accu-check acceptable.  Resume home meds on discharge and f/u with PCP       DM peripheral neuropathy     -cont lyrica and cymbalta      Essential HTN, uncontrolled     -continue home meds lisinopril   -labetaol IV prn while IP     Chronic low back pain     -cont lidocaine patch  -cont tylenol  -tramadol prn for severe pain   -follow-up with pain management specialist on discharge       Tinea capitis     -cont ketoconazole shampoo x 2 weeks and lamisil PO x 6 weeks ( start date 2/26/2021). LFTs normal, repeat LFTs in 1 week and follow-up with PCP     Hx of ADONIS     -per care everywhere, pt had creatinine as high as 6 in the past, otherwise, eGFR looks normal most of the time   -creatinine 1.1 today  -BMP in am       Mild non-AGMA, resolved      Leukocytosis, likely from DM foot ulcer, resolved     Hx of polysubstance abuse     -UDS (-)      On 2/28/2021, patient is doing well. Chronic back pain stable. Patient is medically stable for discharge. Sent perfectserve message to Podiatry and ID Dr. Kade Ahmadi, both are okay for discharge today. Neurology was okay for discharge as well. Podiatry recommend call an appointment in [address] [place] [address] office. ID recommend Augmentin for 14 days. Neurology started the patient with Silvestre Branch as stated above. Patient was advised to continue Keppra on discharge and follow-up with Dr. Jolie Siddiqui ( neurology) in 4 weeks, he was advise to make an appointment. Patient was also advised NO DRIVING/SWIMMING/CLIMBING UNTIL CLEARED BY NEUROLOGIST. Patient verbalized understanding and agreed with plan. The patient was seen and examined on day of discharge and this discharge summary is in conjunction with any daily progress note from day of discharge.         Exam:     Vitals:  Vitals:    02/28/21 0345 02/28/21 0830 02/28/21 1050 02/28/21 1127   BP: (!) 152/103 121/75  124/79   Pulse: 85 84  95   Resp: 18 18  18   Temp: 97.8 °F (36.6 °C) 98.3 °F (36.8 °C)  98 °F (36.7 °C)   TempSrc: Oral Oral  Oral   SpO2: 96% 97% 98% 96%   Weight:       Height:         Weight: Weight: 265 lb 12.8 oz (120.6 kg)     24 hour intake/output:    Intake/Output Summary (Last 24 hours) at 2/28/2021 1737  Last data filed at 2/28/2021 1329  Gross per 24 hour   Intake 714 ml   Output 1000 ml   Net -286 ml         General appearance: alert, not in acute distress   HEENT: Pupils equal, round, and reactive to light. Conjunctivae clear. Neck: Supple, with full range of motion. No jugular venous distention. Trachea midline. Respiratory:  Normal respiratory effort. Clear to auscultation, bilaterally without Rales/Wheezes/Rhonchi. Cardiovascular: normal rate, regular rhythm with normal S1/S2 without murmurs, rubs or gallops. Abdomen: Soft, non-tender, non-distended with normal bowel sounds. Musculoskeletal: passive and active ROM x 4 extremities. (+) lumbar tenderness  Skin: (+) scattered scaly, erythematous/pinkish on base rashes on the scalp  Neurological exam reveals alert, oriented, normal speech, no focal findings or movement disorder noted. Exam of extremities: feet wrapped with ace wrap      Labs: For convenience and continuity at follow-up the following most recent labs are provided:      CBC:    Lab Results   Component Value Date    WBC 6.9 02/28/2021    HGB 14.7 02/28/2021    HCT 43.9 02/28/2021     02/28/2021       Renal:    Lab Results   Component Value Date     02/28/2021    K 4.4 02/28/2021     02/28/2021    CO2 29 02/28/2021    BUN 13 02/28/2021    CREATININE 1.0 02/28/2021    CALCIUM 9.5 02/28/2021         Significant Diagnostic Studies    Radiology:   XR FOOT LEFT (MIN 3 VIEWS)   Final Result    Amputation of the fifth digit with heterotopic ossification without definite evidence of osteomyelitis to limits of the sensitivity of this examination. **This report has been created using voice recognition software. It may contain minor errors which are inherent in voice recognition technology. **      Final report electronically signed by Dr. Eddie Lu MD on 2/26/2021 10:40 AM      XR CHEST PORTABLE   Final Result   Impression:   1.  No acute cardiopulmonary disease. 2.  Poor inspiratory effort accentuating the heart size and pulmonary    vessels. This document has been electronically signed by: Vincent Berrios MD on    02/25/2021 01:25 AM      CT HEAD WO CONTRAST (CODE STROKE)   Final Result   Impression:   1. No CT evidence of acute intracranial pathology. This document has been electronically signed by: Vincent Berrios MD on    02/25/2021 12:58 AM      All CTs at this facility use dose modulation techniques and iterative    reconstructions, and/or weight-based dosing   when appropriate to reduce radiation to a low as reasonably achievable. Consults:     IP CONSULT TO NEUROLOGY  IP CONSULT TO PODIATRY  IP CONSULT TO NEUROLOGY  PHARMACY TO DOSE VANCOMYCIN  IP CONSULT TO INFECTIOUS DISEASES    Disposition:    [x] Home       [] TCU       [] Rehab       [] Psych       [] SNF       [] [place]       [] Other-    Condition at Discharge: Stable    Code Status:  Prior     Patient Instructions:    Discharge lab work: LFTs in 1 week   Activity: activity as tolerated/ NO DRIVING/SWIMMING/CLIMBING UNTIL CLEARED BY NEUROLOGIST   Diet: DIET CARB CONTROL;       Follow-up visits:   Jesús Manning MD    Schedule an appointment as soon as possible for a visit in 1 week  for hospital follow up     Nimco Tapia MD  [address] [place] [address] [address]  [phone]    Schedule an appointment as soon as possible for a visit in 4 weeks  for EEG and hospital follow up     Steph Burnette, [address] [place] [address]  [phone]    Schedule an appointment as soon as possible for a visit in 1 week  for hospital and bilateral foot wound follow up          Discharge Medications:      Nimco Tapia   Home Medication Instructions QWT:[id number]    Printed on:02/28/21 0745   Medication Information                      amLODIPine (NORVASC) 5 MG tablet  Take 1 tablet by mouth daily amoxicillin-clavulanate (AUGMENTIN) 875-125 MG per tablet  Take 1 tablet by mouth 2 times daily for 14 days             aspirin 81 MG chewable tablet  Take 81 mg by mouth daily             buprenorphine (BUTRANS) 10 MCG/HR PTWK  Place 1 patch onto the skin once a week. DULoxetine (CYMBALTA) 60 MG extended release capsule  Take 60 mg by mouth daily             glimepiride (AMARYL) 4 MG tablet  Take 6 mg by mouth every morning             insulin lispro (HUMALOG) 100 UNIT/ML injection vial  Inject 0-15 Units into the skin 3 times daily (before meals)             ketoconazole (NIZORAL) 2 % shampoo  Apply to wet hair/scalp every 3 days for 2 weeks             levETIRAcetam (KEPPRA) 500 MG tablet  Take 1 tablet by mouth 2 times daily             lisinopril (PRINIVIL;ZESTRIL) 40 MG tablet  Take 1 tablet by mouth daily             NONFORMULARY  Apply 1-2 g topically 4 times daily 5FB4-NUCULRTX5/BACLO2/DICLO3/GABA6/LIDO2/PRILO2% Grams cream             pregabalin (LYRICA) 75 MG capsule  Take 1 capsule by mouth 3 times daily for 30 days. terbinafine (LAMISIL) 250 MG tablet  Take 1 tablet by mouth daily                 Time Spent on discharge is more than 30 minutes in the examination, evaluation, counseling and review of medications and discharge plan. Signed: Thank you Mauricio Chester MD for the opportunity to be involved in this patient's care.     Electronically signed by John Hall MD on 2/28/2021 at 5:37PM

## 2021-02-28 NOTE — DISCHARGE INSTR - ACTIVITY
Up as tolerated. ****Weight bearing to heel only in post-op shoe to left foot.  Weight bearing as tolerated in post-op shoe to right foot.    ****NO DRIVING UNTIL SEEN BY NEUROLOGY

## 2021-02-28 NOTE — DISCHARGE INSTR - DIET

## 2021-03-01 LAB
AEROBIC CULTURE: ABNORMAL
ANAEROBIC CULTURE: ABNORMAL
GRAM STAIN RESULT: ABNORMAL
ORGANISM: ABNORMAL
ORGANISM: ABNORMAL

## 2021-03-04 ENCOUNTER — HOSPITAL ENCOUNTER (EMERGENCY)
Age: 56
Discharge: HOME | End: 2021-03-04
Attending: EMERGENCY MEDICINE
Payer: MEDICAID

## 2021-03-04 VITALS
TEMPERATURE: 98.2 F | RESPIRATION RATE: 15 BRPM | OXYGEN SATURATION: 99 % | BODY MASS INDEX: 35.89 KG/M2 | WEIGHT: 265 LBS | HEART RATE: 108 BPM | DIASTOLIC BLOOD PRESSURE: 95 MMHG | HEIGHT: 72 IN | SYSTOLIC BLOOD PRESSURE: 131 MMHG

## 2021-03-04 DIAGNOSIS — G89.29 ACUTE EXACERBATION OF CHRONIC LOW BACK PAIN: Primary | ICD-10-CM

## 2021-03-04 DIAGNOSIS — M54.50 ACUTE EXACERBATION OF CHRONIC LOW BACK PAIN: Primary | ICD-10-CM

## 2021-03-04 LAB — BLOOD CULTURE, ROUTINE: NORMAL

## 2021-03-04 PROCEDURE — 99283 EMERGENCY DEPT VISIT LOW MDM: CPT

## 2021-03-04 PROCEDURE — 6360000002 HC RX W HCPCS: Performed by: STUDENT IN AN ORGANIZED HEALTH CARE EDUCATION/TRAINING PROGRAM

## 2021-03-04 PROCEDURE — 6370000000 HC RX 637 (ALT 250 FOR IP): Performed by: STUDENT IN AN ORGANIZED HEALTH CARE EDUCATION/TRAINING PROGRAM

## 2021-03-04 PROCEDURE — 96372 THER/PROPH/DIAG INJ SC/IM: CPT

## 2021-03-04 RX ORDER — KETOROLAC TROMETHAMINE 30 MG/ML
30 INJECTION, SOLUTION INTRAMUSCULAR; INTRAVENOUS ONCE
Status: COMPLETED | OUTPATIENT
Start: 2021-03-04 | End: 2021-03-04

## 2021-03-04 RX ORDER — LIDOCAINE 4 G/G
1 PATCH TOPICAL ONCE
Status: DISCONTINUED | OUTPATIENT
Start: 2021-03-04 | End: 2021-03-04 | Stop reason: HOSPADM

## 2021-03-04 RX ADMIN — KETOROLAC TROMETHAMINE 30 MG: 30 INJECTION, SOLUTION INTRAMUSCULAR at 19:29

## 2021-03-04 ASSESSMENT — ENCOUNTER SYMPTOMS
VOMITING: 0
SINUS PAIN: 0
SORE THROAT: 0
SINUS PRESSURE: 0
ABDOMINAL PAIN: 0
SHORTNESS OF BREATH: 0
RHINORRHEA: 0
COUGH: 0
BACK PAIN: 1
CHEST TIGHTNESS: 0
COLOR CHANGE: 0
CONSTIPATION: 0
DIARRHEA: 0
WHEEZING: 0
EYE DISCHARGE: 0
TROUBLE SWALLOWING: 0
EYE PAIN: 0

## 2021-03-04 ASSESSMENT — PAIN DESCRIPTION - LOCATION: LOCATION: BACK

## 2021-03-04 ASSESSMENT — PAIN SCALES - GENERAL: PAINLEVEL_OUTOF10: 8

## 2021-03-04 ASSESSMENT — PAIN DESCRIPTION - ORIENTATION: ORIENTATION: MID;LOWER

## 2021-03-05 NOTE — PROGRESS NOTES
Resources for mental health are requested by medical provider. Resources for patients community mental health are provided. Patient is encouraged to call to verify if online or Tele- services are available as he is currently receiving inpatient rehab for addiction.

## 2021-03-05 NOTE — ED NOTES
Presents to ER with complaints of chronic back pain that has been worse than usual. Pt states he has arthritis throughout his back and bilateral hips. States the last 2 days he has been finding it hard to walk. Pt denies taking anything for pain, states he is a recovering alcoholic and has been in rehab. Respirations unlabored.      Cecilia Uriostegui RN  03/04/21 1910

## 2021-04-01 LAB
METER GLUCOSE: 253 MG/DL (ref 70–110)
METER GLUCOSE: 263 MG/DL (ref 70–110)
MRSA SCREEN: NEGATIVE
VANCOMYCIN RESISTANT ENTEROCOCCUS: NEGATIVE

## 2021-04-05 LAB — SURGICAL OR TISSUE SPECIMEN: NORMAL

## 2021-04-08 ENCOUNTER — OFFICE VISIT (OUTPATIENT)
Dept: PHYSICAL MEDICINE AND REHAB | Age: 56
End: 2021-04-08
Payer: MEDICAID

## 2021-04-08 VITALS
BODY MASS INDEX: 35.89 KG/M2 | DIASTOLIC BLOOD PRESSURE: 80 MMHG | OXYGEN SATURATION: 97 % | HEART RATE: 85 BPM | SYSTOLIC BLOOD PRESSURE: 130 MMHG | WEIGHT: 264.99 LBS | HEIGHT: 72 IN

## 2021-04-08 DIAGNOSIS — M48.061 SPINAL STENOSIS OF LUMBAR REGION WITHOUT NEUROGENIC CLAUDICATION: ICD-10-CM

## 2021-04-08 DIAGNOSIS — Z86.39 HISTORY OF DIABETIC NEUROPATHY: ICD-10-CM

## 2021-04-08 DIAGNOSIS — M47.816 LUMBAR SPONDYLOSIS: ICD-10-CM

## 2021-04-08 DIAGNOSIS — M47.816 LUMBAR FACET ARTHROPATHY: ICD-10-CM

## 2021-04-08 DIAGNOSIS — F10.10 ALCOHOL ABUSE: ICD-10-CM

## 2021-04-08 DIAGNOSIS — M48.56XA PATHOLOGICAL COMPRESSION FRACTURE OF LUMBAR VERTEBRA, INITIAL ENCOUNTER (HCC): Primary | ICD-10-CM

## 2021-04-08 PROCEDURE — G8417 CALC BMI ABV UP PARAM F/U: HCPCS | Performed by: PAIN MEDICINE

## 2021-04-08 PROCEDURE — 99244 OFF/OP CNSLTJ NEW/EST MOD 40: CPT | Performed by: PAIN MEDICINE

## 2021-04-08 PROCEDURE — G8427 DOCREV CUR MEDS BY ELIG CLIN: HCPCS | Performed by: PAIN MEDICINE

## 2021-04-08 RX ORDER — FLUOXETINE 10 MG/1
10 CAPSULE ORAL DAILY
COMMUNITY

## 2021-04-08 ASSESSMENT — ENCOUNTER SYMPTOMS
CONSTIPATION: 0
VOMITING: 0
WHEEZING: 0
COLOR CHANGE: 0
DIARRHEA: 0
SORE THROAT: 0
BOWEL INCONTINENCE: 0
ABDOMINAL PAIN: 0
EYE PAIN: 0
COUGH: 0
NAUSEA: 0
RHINORRHEA: 0
SINUS PRESSURE: 0
CHEST TIGHTNESS: 0
PHOTOPHOBIA: 0
BACK PAIN: 1
SHORTNESS OF BREATH: 0

## 2021-04-08 NOTE — LETTER
[facility]  [address] [facility] [address] [place] [address]  Phone: [phone]  Fax: [phone]    Lavonia Hamman, MD        April 8, 2021       Patient: Jennifer Simmons   MR Number: [id number]   YOB: 1965   Date of Visit: 4/8/2021       Dear Dr. Smith Asa:    Thank you for the request for consultation for Krissy Jackson to me for the evaluation of back pain. Below are the relevant portions of my assessment and plan of care. If you have questions, please do not hesitate to call me. I look forward to following Elizabeth Yip along with you.     Sincerely,        Abimbola Calloway MD    CC providers:  Paige Corbett MD  [address] [place] [address]  [place]ia [place] [address] [facility], [place] Danyel Burris [address] [place] [address]  Via Mail

## 2021-04-08 NOTE — PROGRESS NOTES
[address] [facility] [address]  Dept: [phone]  Dept Fax: [phone]: [phone]    Visit Date: 4/8/2021    Patrick Martinez is a 48 y.o. male who is referred for pain management evaluation and treatment per Dr. Remedios Baer. CAGE and CAGE[employer] Questions   1. In the last three months, have you felt you should cut down or stop drinking or using drugs? Yes []        No [x]     2. In the last three months, has anyone annoyed you or gotten on your nerves by telling you to cut down or stop drinking or using drugs? Yes []        No [x]     3. In the last three months, have you felt guilty or bad about how much you drink or use drugs? Yes [x]        No []     4. In the last three months, have you been waking up wanting to have an alcoholic drink or use drugs? Yes []        No [x]        Opioid Risk Tool:  Clinician Form       1. Family History of Substance Abuse: Female Male    Alcohol   []1   []3    Illegal drugs   []2   []3    Prescription drugs     []4   []4   2. Personal History of Substance Abuse:          Alcohol   []3   [x]3    Illegal drugs   []4   []4    Prescription drugs     []5   []5   3. Age (raúl box if between 12 and 39):     []1   []1   4. History of Preadolescent Sexual Abuse:     []3   []0   5. Psychological Disease:      Attention deficit disorder, obsessive-compulsive disorder, bipolar, schizophrenia   []2   []2      Depression     []1   [x]1    Scoring Totals 0 4     Total Score  Low Risk  Moderate Risk  High Risk   Risk Category   0 - 3   4 - 7   8 or Above      Patient states symptoms interfere with:  A.  General Activity:  yes   B. Mood: yes    C. Walking Ability:   yes   D. Normal Work (Includes both work outside the home and housework):   yes    E.  Relations with Other People:  yes   F. Sleep:   yes   G.  Enjoyment of Life:  yes     Incarcerated sounds: Normal heart sounds. No murmur. No friction rub. No gallop. Pulmonary:      Effort: Pulmonary effort is normal. No respiratory distress. Breath sounds: Normal breath sounds. No wheezing or rales. Chest:      Chest wall: No tenderness. Abdominal:      General: Bowel sounds are normal. There is no distension. Palpations: Abdomen is soft. Tenderness: There is no abdominal tenderness. There is no guarding or rebound. Musculoskeletal:      Right hip: He exhibits no tenderness. Left hip: He exhibits no tenderness. Right knee: He exhibits normal range of motion. No tenderness found. Left knee: Tenderness found. Right ankle: He exhibits no swelling. Cervical back: He exhibits normal range of motion, no tenderness and no pain. Thoracic back: He exhibits tenderness. Lumbar back: He exhibits decreased range of motion, tenderness and pain. Back:         Legs:    Skin:     General: Skin is warm. Coloration: Skin is not pale. Findings: No erythema or rash. Neurological:      Mental Status: He is alert and oriented to person, place, and time. He is not disoriented. Cranial Nerves: No cranial nerve deficit. Sensory: Sensory deficit present. Motor: No weakness, atrophy or abnormal muscle tone. Coordination: Coordination normal.      Gait: Gait abnormal.      Deep Tendon Reflexes: Reflexes are normal and symmetric. Babinski sign absent on the right side. Reflex Scores:       Tricep reflexes are 2+ on the right side and 2+ on the left side. Bicep reflexes are 2+ on the right side and 2+ on the left side. Brachioradialis reflexes are 2+ on the right side and 2+ on the left side. Patellar reflexes are 2+ on the right side. Achilles reflexes are 2+ on the right side. Comments: SENSORY- decreased right foot.  Unable to examen left cast present  SLR neg   Psychiatric:         Attention and Perception: Attention normal. He is attentive. Mood and Affect: Mood normal. Mood is not anxious or depressed. Affect is not labile, blunt, angry or inappropriate. Speech: Speech normal. He is communicative. Speech is not rapid and pressured, delayed, slurred or tangential.         Behavior: Behavior normal. Behavior is not agitated, slowed, aggressive, withdrawn, hyperactive or combative. Behavior is cooperative. Thought Content: Thought content normal. Thought content is not paranoid or delusional. Thought content does not include homicidal or suicidal ideation. Thought content does not include homicidal or suicidal plan. Cognition and Memory: Cognition normal. Memory is not impaired. He does not exhibit impaired recent memory or impaired remote memory. Judgment: Judgment normal. Judgment is not impulsive or inappropriate. JOSE CARLOS test: neg  Yeoman's test: neg  Gaenslen test: neg     Assessment:     1. Pathological compression fracture of lumbar vertebra, initial encounter ([address]    2. Lumbar spondylosis    3. Lumbar facet arthropathy    4. Spinal stenosis of lumbar region without neurogenic claudication    5. History of diabetic neuropathy    6. Alcohol abuse            Plan:      · Patient read and signed orientation and opioid agreement. · OARRS reviewed. Current MED: 0  · Patient was not offered naloxone for home. · Discussed long term side effects of medications, tolerance, dependency and addiction. · UDS NOT preformed today. · Patient told can not receive any pain medications from any other source. · No evidence of abuse, diversion or aberrant behavior. · Prescription Needs: No prescription pain medications at this time   Medications and/or procedures to improve function and quality of life- patient understanding with this and that may not be pain free   Discussed possible weaning of medication dosing dependent on treatment/procedure results.     Discussed with patient about safe storage of medications at home   Testing: Reviewed MRI and CT Lumbar Spine with patient in detail.  Procedures: Kyphoplasty @ L1 and other levels if needed.  Discussed with patient about risks with procedure including infection, reaction to medication, increased pain, or bleeding.  Medications: Reviewed with patient.  Will need Clearance from Dr Gerson Mullen prior to Kyphoplasty.  Will need CBC/diff  Ordered prior to Kyphoplasty.  If patient is on blood thinners will need approval to hold:no       Previous Treatments tried:  · PT: No,   · NSAIDs: No,  Kidney disease  · Chiropractic: No,  any benefit? No  · Muscle relaxants: Yes,  any benefit? Yes  · Narcotics: Yes,  any benefit? Yes  · Spine surgeon consult: Yes  · Any Implants: No    Meds. Prescribed:   No orders of the defined types were placed in this encounter. Return Kyphoplasty @ L1 and other levels if needed. .     Time spent with patient was 60 minutes more than 50% was spent  Counseling/coordinated the patient'scare.     Electronically signed by Moni Kyle MD on 4/8/2021 at 5:18 PM

## 2021-04-09 ENCOUNTER — TELEPHONE (OUTPATIENT)
Dept: PHYSICAL MEDICINE AND REHAB | Age: 56
End: 2021-04-09

## 2021-04-09 NOTE — TELEPHONE ENCOUNTER
[address] called office regarding procedure. Informed her that we have to have clearance from Dr. Gerson Mullen before we can schedule procedure. States that we are to contact her @ [phone] regarding scheduling. We are NOT to contact wife regarding this. Pt. Is not to know when the procedure and follow up is scheduled due to numerous issues that can arise.

## 2021-04-12 ENCOUNTER — TELEPHONE (OUTPATIENT)
Dept: PHYSICAL MEDICINE AND REHAB | Age: 56
End: 2021-04-12

## 2021-04-12 NOTE — TELEPHONE ENCOUNTER
Medical clearance from Dr. Raymundo Franks received and scanned into media tab. Dr. Raymundo Frakns is not approving clearance request at this time. How would you like to proceed? Emmy Gambino from [employer] notified of denial at [phone].

## 2021-04-28 ENCOUNTER — TELEPHONE (OUTPATIENT)
Dept: PHYSICAL MEDICINE AND REHAB | Age: 56
End: 2021-04-28

## 2021-04-28 NOTE — TELEPHONE ENCOUNTER
Spoke with Alana CHURCH-Tomas Cross. States that Dr. Howe Code cleared the pt. For procedure. Med. Clearance faxed. Informed her we will notify her when we receive clearance. Verbalized understanding.

## 2021-05-04 ENCOUNTER — TELEPHONE (OUTPATIENT)
Dept: PHYSICAL MEDICINE AND REHAB | Age: 56
End: 2021-05-04

## 2021-05-12 ENCOUNTER — TELEPHONE (OUTPATIENT)
Dept: PHYSICAL MEDICINE AND REHAB | Age: 56
End: 2021-05-12

## 2021-05-12 DIAGNOSIS — M48.56XA PATHOLOGICAL COMPRESSION FRACTURE OF LUMBAR VERTEBRA, INITIAL ENCOUNTER (HCC): Primary | ICD-10-CM

## 2021-05-12 DIAGNOSIS — Z41.9 ELECTIVE SURGICAL PROCEDURE: Primary | ICD-10-CM

## 2021-05-12 NOTE — TELEPHONE ENCOUNTER
Pt denies taking any blood thinners except ASA 81 mg. (no cardiac events in the last 6 months) prior to scheduling procedure. Kyphoplasty scheduled 6/3/2021 @ 7:30, arrive @ 6:30am. Follow up 6/17/2021 @ 1:15pm. Educated on pre-procedure instructions, also mailed. Covid test order also mailed. Verbalized understanding. [facility] LOZANO Notified.

## 2021-05-12 NOTE — TELEPHONE ENCOUNTER
Med. Clearance received from Dr. Deb Park. [facility] for pt. to call office.

## 2021-05-26 ENCOUNTER — TELEPHONE (OUTPATIENT)
Dept: PHYSICAL MEDICINE AND REHAB | Age: 56
End: 2021-05-26

## 2021-05-26 DIAGNOSIS — M48.56XA PATHOLOGICAL COMPRESSION FRACTURE OF LUMBAR VERTEBRA, INITIAL ENCOUNTER (HCC): ICD-10-CM

## 2021-05-26 DIAGNOSIS — Z41.9 ELECTIVE SURGICAL PROCEDURE: Primary | ICD-10-CM

## 2021-05-26 LAB
ATYPICAL LYMPHOCYTES: NORMAL
BASOPHILS # BLD: 0.4 % (ref 0–3)
EOSINOPHIL # BLD: 0.8 % (ref 0–4)
HCT VFR BLD CALC: 43.8 % (ref 42–52)
HEMOGLOBIN: 14.8 G/DL (ref 14–18)
LYMPHOCYTES # BLD: 36.8 % (ref 17.6–49.6)
MCH RBC QN AUTO: 30.8 PG (ref 28–32)
MCHC RBC AUTO-ENTMCNC: 33.9 G/DL (ref 33–37)
MCV RBC AUTO: 90.9 FL (ref 80–94)
MONOCYTES # BLD: 6.7 % (ref 4.1–12.4)
MORPHOLOGY: ABNORMAL
PDW BLD-RTO: 13.7 % (ref 11.5–14.5)
PLATELET # BLD: 308 THOU/CUMM (ref 130–400)
PLATELET ESTIMATE: ADEQUATE
RBC: 4.82 MIL/CUMM (ref 4.7–6.1)
SARS-COV-2, PCR: NOT DETECTED
SCAN OF BLOOD SMEAR: YES
SEG NEUTROPHILS: 55.3 % (ref 39.4–72.5)
WBC: 15.7 THOU/CUMM (ref 4.8–10.8)

## 2021-05-26 NOTE — TELEPHONE ENCOUNTER
Orders entered for a Covid test and CBC with Diff. Pt. Contacted. Informed of pre-procedure tests. Orders faxed to [facility] Verbalized understanding.

## 2021-05-27 ENCOUNTER — TELEPHONE (OUTPATIENT)
Dept: PHYSICAL MEDICINE AND REHAB | Age: 56
End: 2021-05-27

## 2021-05-27 DIAGNOSIS — Z41.9 ELECTIVE SURGICAL PROCEDURE: ICD-10-CM

## 2021-05-27 NOTE — TELEPHONE ENCOUNTER
CAROLINE drawn 5/26/2021:  WBC 15. 7High   4.8 - 10.8 thou/cumm Final     Pt. Is scheduled for a Kyphoplasty 6/3/2021 @ 7:30.  Thanks

## 2021-05-27 NOTE — TELEPHONE ENCOUNTER
Spoke with Conchita Macario office. Informed her of need for clearance due to abnormal labs. Med. Clearance faxed with lab results. Appointment scheduled 6/1/2021 @ 10:30am. Pt. Notified. Informed of labs and appointment date/time. Advised to call their office with any questions regarding appointment.

## 2021-05-28 LAB — PATHOLOGIST REVIEW: NORMAL

## 2021-06-01 NOTE — TELEPHONE ENCOUNTER
Pt. Contacted. States that they amber blood cultures at appointment with Dr. Demond Macias today. He was informed that they would only have the preliminary results back by 6/3/2021. Procedure rescheduled to 6/22/2021 @ 7:30, arrive at 6:30. Follow up 7/6/2021 @ 1:15pm.   [facility] Notified.

## 2021-06-03 NOTE — TELEPHONE ENCOUNTER
Spoke with Dr. Donalda Schlatter office staff regarding blood cultures. States that pt. had an order to have them drawn. They did not draw them. Contacted pt. Verified. States tht he had them drawn at [facility]. Informed him that I will keep in contact with Dr. Marky Chowdary regarding Blood cultures and results. Verbalized understanding.

## 2021-06-04 NOTE — TELEPHONE ENCOUNTER
Medical clearance received from Dr. Arlene Venegas. Blood Cultures - preliminary, No osteomyelitis seen on xray.

## 2021-06-16 ENCOUNTER — HOSPITAL ENCOUNTER (OUTPATIENT)
Dept: MRI IMAGING | Age: 56
Discharge: HOME | End: 2021-06-16

## 2021-06-16 ENCOUNTER — TELEPHONE (OUTPATIENT)
Dept: PHYSICAL MEDICINE AND REHAB | Age: 56
End: 2021-06-16

## 2021-06-16 DIAGNOSIS — Z00.6 EXAMINATION FOR NORMAL COMPARISON OR CONTROL IN CLINICAL RESEARCH: ICD-10-CM

## 2021-06-16 NOTE — TELEPHONE ENCOUNTER
Pt. Contacted. Procedure and follow up rescheduled per [facility]. Verbalized understanding. Heber LOZANO notified.

## 2021-07-02 ENCOUNTER — TELEPHONE (OUTPATIENT)
Dept: PHYSICAL MEDICINE AND REHAB | Age: 56
End: 2021-07-02

## 2021-07-02 DIAGNOSIS — M48.56XA PATHOLOGICAL COMPRESSION FRACTURE OF LUMBAR VERTEBRA, INITIAL ENCOUNTER (HCC): Primary | ICD-10-CM

## 2021-07-02 NOTE — TELEPHONE ENCOUNTER
Pt. Contacted regarding repeat K+. Order entered. Pt. Is questioning if he should have another MRI done before the Kyphoplasty as it has been 6 months since his last one. Please advise.  Thanks

## 2021-07-06 NOTE — TELEPHONE ENCOUNTER
Pt says his pain is significantly worse and he can only stand for 10-15mins at a time. He is curious is there is more going on. Please advise.

## 2021-07-07 ENCOUNTER — TELEPHONE (OUTPATIENT)
Dept: PHYSICAL MEDICINE AND REHAB | Age: 56
End: 2021-07-07

## 2021-07-07 DIAGNOSIS — M54.9 INTRACTABLE BACK PAIN: Primary | ICD-10-CM

## 2021-07-07 NOTE — TELEPHONE ENCOUNTER
Left message for pt to call and let us know if MRI was scheduled with [facility] as we have approval.  Once we know when the MRI is, we can move procedure out accordingly. Pt will need to get disc from  as well.

## 2021-07-08 ENCOUNTER — TELEPHONE (OUTPATIENT)
Dept: PHYSICAL MEDICINE AND REHAB | Age: 56
End: 2021-07-08

## 2021-07-08 NOTE — TELEPHONE ENCOUNTER
Spoke with [facility] and confirmed we could move patient's kypho to Fri @ 11:30 arrival due to pt getting MRI for increased pain. Per [facility], confirm with scheduling that anesthesiologist will be available on 7-16-21 @ 12:30pm for ASC. Spoke with Scheduling and they said anesthesiologist will be available. [facility] to ensure we will have MRI results faxed to us STAT for Friday's procedure. Spoke with Werner. She stated that she put a note in the chart to fax results of MRI STAT to [phone]; ATTN: Irena Bolanos. Spoke with patient and reviewed new procedure and follow up time and asked that he bring disc of MRI to procedure from [facility].  Pt expressed understanding. Heber LOZANO Notified of procedure time change.

## 2021-07-16 NOTE — TELEPHONE ENCOUNTER
[address] . Pt. Did not have MRI and K+ level drawn yesterday. MRI rescheduled to 7/20/2021 @ 9am. LVM for pt. To call office. Dr. Iris Mccoy notified to advise how to proceed.

## 2021-07-16 NOTE — TELEPHONE ENCOUNTER
Ryan Guadarrama MD  [facility]    [facility]   [address] [place], [place] [id number] [phone]      [facility]   [address]  [place] [address] [phone]     Shoulder Replacement Postoperative Discharge Instructions    ACTIVITY  [x]  Active shoulder motion is allowed after the surgery with some limits for six weeks to allow for healing of the shoulder.  [x]  May begin active elbow, wrist and hand motion the day of surgery and continue with this several times daily.  [x]  Use the UltraSling for the first 2 weeks for comfort.  [x]  If a continuous passive motion (CPM) is ordered begin the day after surgery as instructed.  Limit the external rotation (second number) to 30? for the first 4 weeks.   [x]  NO shoulder extension for 6 weeks.  [x]  NO shoulder internal rotation against resistance for 6 weeks.    WOUND CARE/BANDAGES  [x]  Keep the Silverlon dressing on until your postoperative clinic appointment.  [x]  The dressing may get wet.    BATHING  [x]  May shower 2 days following the surgery.  [x]  NO bathing or complete soaking of the incision is allowed for 10 days.    COLD CUFF  [x]  If a cold cuff is applied at the time of surgery or delivered to your house, continue use as instructed at the hospital for the first 7-10 days after surgery to help with pain relief and to decrease swelling.  [x]  If the cold cuff becomes uncomfortable, discontinue use.  [x]  May use ice packs instead of cold cuff.    MEDICATIONS  [x]  A prescription for pain medication that often includes Oxycodone will be dispensed at the time of discharge.  The medication can cause drowsiness, nausea or a fine rash.  If these problems develop, contact our office for a different medication.  [x]  You should not drive an automobile or operate heavy machinery while on pain medication.  [x]  Senokot S 50/8.6 mg daily for constipation while taking narcotic pain  Pt. Called office. States he had to reschedule his MRI yesterday to 7/20/2021 @ 9am. Kyphoplasty rescheduled per pt. request  to 8/6/2021 @ 7:45, arrive @ 6:45am. Gwendolyn LOZANO Notified.  LVM for follow up 8/18/2021 @ 4:20pm. medications.  May need to supplement this with Milk of Magnesia if constipation persists.    EXERCISE/PHYSICAL THERAPY  [x]  Begin physical therapy within one week after the surgery.  [x]  Physical therapy is necessary for 2-4 months after shoulder replacement surgery.     FOLLOW UP  [x]  Call [phone] the first weekday after your surgery to schedule a postoperative clinic appointment 10 days after surgery.    RESULTS  Shoulder replacements are slow to heal.  Overall success rates with pain relief is greater than 90%, but will likely take 2-3 months before significant pain relief is noticed.  Full recovery of shoulder strength, motion and endurance may take 6-12 months following surgery.    When to Call Your Doctor  Call your doctor right away at [phone] or [phone] if after hours or on weekends (ask for the Orthopedic Surgeon on call) if you have any of these problems after surgery:   • Increased shoulder pain  • Swelling/redness  • Yellowish drainage  • Fever over 101.0°F or 38.3°C  • Pain not controlled by medication and cold therapy is also a cause for concern

## 2021-07-22 ENCOUNTER — TELEPHONE (OUTPATIENT)
Dept: PHYSICAL MEDICINE AND REHAB | Age: 56
End: 2021-07-22

## 2021-07-22 NOTE — TELEPHONE ENCOUNTER
Pt. Contacted. Procedure and follow up rescheduled due to physician out of office. Procedure is 8/13/2021 @ 11:15am, arrive @ 10:15am. Reminded pt. To get K+ level ASAP. Heber LOZANO Notified. [facility] contacted regarding MRI results. They will fax, fax number given.

## 2021-07-29 ENCOUNTER — TELEPHONE (OUTPATIENT)
Dept: PHYSICAL MEDICINE AND REHAB | Age: 56
End: 2021-07-29

## 2021-07-29 DIAGNOSIS — M54.9 INTRACTABLE BACK PAIN: ICD-10-CM

## 2021-07-29 NOTE — TELEPHONE ENCOUNTER
Pt. Called office wanting to know if his Kyphoplasty is still on. His recent Lumbar MRI and CMP is scanned in. Can you review and advise?  Thanks

## 2021-08-04 ENCOUNTER — TELEPHONE (OUTPATIENT)
Dept: PHYSICAL MEDICINE AND REHAB | Age: 56
End: 2021-08-04

## 2021-08-04 DIAGNOSIS — M48.56XA PATHOLOGICAL COMPRESSION FRACTURE OF LUMBAR VERTEBRA, INITIAL ENCOUNTER (HCC): Primary | ICD-10-CM

## 2021-08-04 NOTE — TELEPHONE ENCOUNTER
Pt. Contacted. Procedure and follow up cancelled due to provider out of office. Referral to Dr. Ita Sanford ordered.

## 2021-08-10 ENCOUNTER — TELEPHONE (OUTPATIENT)
Dept: PHYSICAL MEDICINE AND REHAB | Age: 56
End: 2021-08-10

## 2021-08-11 ENCOUNTER — HOSPITAL ENCOUNTER (OUTPATIENT)
Age: 56
Discharge: HOME | End: 2021-08-11
Payer: MEDICAID

## 2021-08-11 ENCOUNTER — OFFICE VISIT (OUTPATIENT)
Dept: PHYSICAL MEDICINE AND REHAB | Age: 56
End: 2021-08-11
Payer: MEDICAID

## 2021-08-11 ENCOUNTER — HOSPITAL ENCOUNTER (OUTPATIENT)
Dept: GENERAL RADIOLOGY | Age: 56
Discharge: HOME | End: 2021-08-11
Payer: MEDICAID

## 2021-08-11 ENCOUNTER — TELEPHONE (OUTPATIENT)
Dept: PHYSICAL MEDICINE AND REHAB | Age: 56
End: 2021-08-11

## 2021-08-11 VITALS
WEIGHT: 270 LBS | HEIGHT: 73 IN | BODY MASS INDEX: 35.78 KG/M2 | SYSTOLIC BLOOD PRESSURE: 116 MMHG | DIASTOLIC BLOOD PRESSURE: 60 MMHG | HEART RATE: 70 BPM

## 2021-08-11 DIAGNOSIS — Z86.39 HISTORY OF DIABETIC NEUROPATHY: ICD-10-CM

## 2021-08-11 DIAGNOSIS — Z41.9 ELECTIVE SURGICAL PROCEDURE: ICD-10-CM

## 2021-08-11 DIAGNOSIS — M48.56XA PATHOLOGICAL COMPRESSION FRACTURE OF LUMBAR VERTEBRA, INITIAL ENCOUNTER (HCC): Primary | ICD-10-CM

## 2021-08-11 DIAGNOSIS — M54.9 INTRACTABLE BACK PAIN: ICD-10-CM

## 2021-08-11 DIAGNOSIS — M17.0 PRIMARY OSTEOARTHRITIS OF BOTH KNEES: ICD-10-CM

## 2021-08-11 DIAGNOSIS — M48.061 SPINAL STENOSIS OF LUMBAR REGION WITHOUT NEUROGENIC CLAUDICATION: ICD-10-CM

## 2021-08-11 DIAGNOSIS — M47.816 LUMBAR SPONDYLOSIS: ICD-10-CM

## 2021-08-11 DIAGNOSIS — M47.816 LUMBAR FACET ARTHROPATHY: ICD-10-CM

## 2021-08-11 DIAGNOSIS — F10.10 ALCOHOL ABUSE: ICD-10-CM

## 2021-08-11 PROCEDURE — 1036F TOBACCO NON-USER: CPT | Performed by: NURSE PRACTITIONER

## 2021-08-11 PROCEDURE — 73562 X-RAY EXAM OF KNEE 3: CPT

## 2021-08-11 PROCEDURE — G8427 DOCREV CUR MEDS BY ELIG CLIN: HCPCS | Performed by: NURSE PRACTITIONER

## 2021-08-11 PROCEDURE — G8417 CALC BMI ABV UP PARAM F/U: HCPCS | Performed by: NURSE PRACTITIONER

## 2021-08-11 PROCEDURE — 99215 OFFICE O/P EST HI 40 MIN: CPT | Performed by: NURSE PRACTITIONER

## 2021-08-11 PROCEDURE — 3017F COLORECTAL CA SCREEN DOC REV: CPT | Performed by: NURSE PRACTITIONER

## 2021-08-11 RX ORDER — HYDROCODONE BITARTRATE AND ACETAMINOPHEN 5; 325 MG/1; MG/1
1 TABLET ORAL EVERY 8 HOURS PRN
Qty: 90 TABLET | Refills: 0 | Status: SHIPPED | OUTPATIENT
Start: 2021-08-11 | End: 2021-10-18 | Stop reason: SDUPTHER

## 2021-08-11 RX ORDER — METHYLPREDNISOLONE 4 MG/1
TABLET ORAL
Qty: 1 KIT | Refills: 0 | Status: SHIPPED | OUTPATIENT
Start: 2021-08-11 | End: 2021-08-17

## 2021-08-11 ASSESSMENT — ENCOUNTER SYMPTOMS
BACK PAIN: 1
SORE THROAT: 0
CONSTIPATION: 0
DIARRHEA: 0
PHOTOPHOBIA: 0
COLOR CHANGE: 0
RHINORRHEA: 0
VOMITING: 0
SHORTNESS OF BREATH: 0
WHEEZING: 0
ABDOMINAL PAIN: 0
NAUSEA: 0
SINUS PRESSURE: 0
CHEST TIGHTNESS: 0
EYE PAIN: 0
COUGH: 0

## 2021-08-11 NOTE — PROGRESS NOTES
[address] [facility] [address]  Dept: [phone]  Dept Fax: [phone]: [phone]    Visit Date: 8/11/2021    Functionality Assessment/Goals Worksheet     On a scale of 0 (Does not Interfere) to 10 (Completely Interferes)     1. Which number describes how during the past week pain has interfered with       the following:  A. General Activity:  8  B. Mood: 7  C. Walking Ability:  9  D. Normal Work (Includes both work outside the home and housework):  9  E. Relations with Other People:   4  F. Sleep:   8  G. Enjoyment of Life:   6    2. Patient Prefers to Take their Pain Medications:     [x]  On a regular basis   []  Only when necessary    []  Does not take pain medications    3. What are the Patient's Goals/Expectations for Visiting Pain Management? []  Learn about my pain    [x]  Receive Medication   []  Physical Therapy     []  Treat Depression   []  Receive Injections    []  Treat Sleep   []  Deal with Anxiety and Stress   []  Treat Opoid Dependence/Addiction   []  Other:      HPI:   Graylon Goldberg is a 48 y.o. male is here today for    Chief Complaint: Low back pain, Mid back pain and Hip pain  Bilateral knee  HPI   F/U has not had Kyphoplasty L1 yet due to multiple reschedules cancellations abnormal labs and MRI scheduling issues. He has increased mid to low back pain bilateral knee pain. He will be planning  kyphoplasty evaluation L1 with Dr Oksana Manning after he reviews the case. Discussed LESI L3  repeat Lumbar RFA he has had in past or TFLESI  SI MBB if need. His bilateral knee pain is constant increases with stairs standing bending walking. Pain increases with bending, lifting, twisting , turning torso, reaching, pushing, pulling, walking, standing, raising arms, stairs and getting up and down.   Treatments Tried PT, ice, heat, NSAIDS, narcotics, muscle relaxer, OTC rubs creams patches, steroid burst and injections  Pain Description sharp, shooting, stabbing, burning, pressure, throbbing, aching, numbness, tingling and pins and needles    Medications reviewed. Patient denies side effects with medications. Patient states he is taking medications as prescribed. Hedenies receiving pain medications from other sources. He denies any ER visits since last visit. Pain scale with out pain medications or at its worst is 8/10. Pain scale with pain medications or at its best is 3/10. The patienthas No Known Allergies. Subjective:      Review of Systems   Constitutional: Positive for activity change. Negative for appetite change, chills, diaphoresis, fatigue, fever and unexpected weight change. HENT: Negative for congestion, ear pain, hearing loss, mouth sores, nosebleeds, rhinorrhea, sinus pressure and sore throat. Eyes: Negative for photophobia, pain and visual disturbance. Respiratory: Negative for cough, chest tightness, shortness of breath and wheezing. Cardiovascular: Negative for chest pain and palpitations. Gastrointestinal: Negative for abdominal pain, constipation, diarrhea, nausea and vomiting. Endocrine: Negative for cold intolerance, heat intolerance, polydipsia, polyphagia and polyuria. IDDM   Genitourinary: Negative for decreased urine volume, difficulty urinating, frequency and hematuria. Musculoskeletal: Positive for arthralgias, back pain, gait problem, myalgias, neck pain and neck stiffness. Negative for joint swelling. Skin: Negative for color change and rash. Allergic/Immunologic: Negative for food allergies and immunocompromised state. Neurological: Positive for seizures and numbness. Negative for dizziness, tremors, syncope, facial asymmetry, speech difficulty, weakness, light-headedness and headaches. Hematological: Does not bruise/bleed easily. Psychiatric/Behavioral: Positive for sleep disturbance.  Negative for agitation, behavioral problems, confusion, decreased concentration, dysphoric mood, hallucinations, self-injury and suicidal ideas. The patient is nervous/anxious. The patient is not hyperactive. Objective:     Vitals:    08/11/21 1142   BP: 116/60   Site: Left Upper Arm   Position: Sitting   Cuff Size: Large Adult   Pulse: 70   Weight: 270 lb (122.5 kg)   Height: 6' 1\" (1.854 m)       Physical Exam  Vitals and nursing note reviewed. Constitutional:       General: He is not in acute distress. Appearance: He is well-developed. He is not diaphoretic. HENT:      Head: Normocephalic and atraumatic. Right Ear: External ear normal.      Left Ear: External ear normal.      Nose: Nose normal.      Mouth/Throat:      Pharynx: No oropharyngeal exudate. Eyes:      General: No scleral icterus. Right eye: No discharge. Left eye: No discharge. Conjunctiva/sclera: Conjunctivae normal.      Pupils: Pupils are equal, round, and reactive to light. Neck:      Thyroid: No thyromegaly. Cardiovascular:      Rate and Rhythm: Normal rate and regular rhythm. Heart sounds: Normal heart sounds. No murmur heard. No friction rub. No gallop. Pulmonary:      Effort: Pulmonary effort is normal. No respiratory distress. Breath sounds: Normal breath sounds. No wheezing or rales. Chest:      Chest wall: No tenderness. Abdominal:      General: Bowel sounds are normal. There is no distension. Palpations: Abdomen is soft. Tenderness: There is no abdominal tenderness. There is no guarding or rebound. Musculoskeletal:      Cervical back: Full passive range of motion without pain, normal range of motion and neck supple. No edema, erythema, rigidity or tenderness. No muscular tenderness. Normal range of motion. Thoracic back: Tenderness present. Lumbar back: Tenderness present. Decreased range of motion. Back:       Right hip: No tenderness.       Left hip: No tenderness. Right knee: Normal range of motion. No tenderness. Left knee: Tenderness present. Right ankle: No swelling. Legs:    Skin:     General: Skin is warm. Coloration: Skin is not pale. Findings: No erythema or rash. Neurological:      Mental Status: He is alert and oriented to person, place, and time. He is not disoriented. Cranial Nerves: No cranial nerve deficit. Sensory: Sensory deficit present. Motor: No weakness, atrophy or abnormal muscle tone. Coordination: Coordination normal.      Gait: Gait abnormal.      Deep Tendon Reflexes: Reflexes are normal and symmetric. Babinski sign absent on the right side. Reflex Scores:       Tricep reflexes are 2+ on the right side and 2+ on the left side. Bicep reflexes are 2+ on the right side and 2+ on the left side. Brachioradialis reflexes are 2+ on the right side and 2+ on the left side. Patellar reflexes are 2+ on the right side. Achilles reflexes are 2+ on the right side. Comments: SENSORY- decreased right foot. Unable to examen left cast present  SLR neg   Psychiatric:         Attention and Perception: Attention normal. He is attentive. Mood and Affect: Mood normal. Mood is not anxious or depressed. Affect is not labile, blunt, angry or inappropriate. Speech: Speech normal. He is communicative. Speech is not rapid and pressured, delayed, slurred or tangential.         Behavior: Behavior normal. Behavior is not agitated, slowed, aggressive, withdrawn, hyperactive or combative. Behavior is cooperative. Thought Content: Thought content normal. Thought content is not paranoid or delusional. Thought content does not include homicidal or suicidal ideation. Thought content does not include homicidal or suicidal plan. Cognition and Memory: Cognition normal. Memory is not impaired. He does not exhibit impaired recent memory or impaired remote memory. Judgment: Judgment normal. Judgment is not impulsive or inappropriate. JOSE CARLOS test+  Laray Memo test:+       Assessment:     1. Pathological compression fracture of lumbar vertebra, initial encounter ([facility] [address]    2. Intractable back pain    3. Lumbar spondylosis    4. Lumbar facet arthropathy    5. Spinal stenosis of lumbar region without neurogenic claudication    6. History of diabetic neuropathy    7. Elective surgical procedure    8. Alcohol abuse    9. Primary osteoarthritis of both knees            Plan:      · OARRS reviewed. Current MED:2  · Patient was not offered naloxone for home. · Discussed long term side effects of medications, tolerance, dependency and addiction. · Previous UDS reviewed  · UDS preformed today for compliance. · Patient told can not receive any pain medications from any other source. · No evidence of abuse, diversion or aberrant behavior.  Medications and/or procedures to improve function and quality of life- patient understanding with this and that may not be pain free   Discussed with patient about safe storage of medications at home   Discussed possible weaning of medication dosing dependent on treatment/procedure results.  Testing: Lumbar MRI reviewed knee XR ordered bilateral   Procedures: Kyphoplasty L1 with Dr Didi Rehman after reviews case   Discussed with patient about risks with procedure including infection, reaction to medication, increased pain, or bleeding.  Medications:Lyrica  Norco PRN short term until Kyphoplasty. Has not had ETOH in 8 months, discussed  Pill count UDS and pain contract   Medrol dose pack for pain lumbar thoracic flare due to  Kyphoplasty being delayed again.  If patient is on blood thinners will need approval to hold:ASPIRN       Meds. Prescribed:   Orders Placed This Encounter   Medications    methylPREDNISolone (MEDROL DOSEPACK) 4 MG tablet     Sig: Take by mouth.      Dispense:  1 kit     Refill:  0    HYDROcodone-acetaminophen (NORCO) 5-325 MG per tablet     Sig: Take 1 tablet by mouth every 8 hours as needed for Pain for up to 30 days. Dispense:  90 tablet     Refill:  0     Reduce doses taken as pain becomes manageable       Return for kyphoplasty L1 with Dr Levy Nunez , Follow up after procedure.                Electronically signed by AVRIL Phillips CNP on8/11/2021 at 5:09 PM

## 2021-08-16 ENCOUNTER — HOSPITAL ENCOUNTER (OUTPATIENT)
Dept: MRI IMAGING | Age: 56
Discharge: HOME | End: 2021-08-16

## 2021-08-16 DIAGNOSIS — Z00.6 ENCOUNTER FOR EXAMINATION FOR NORMAL COMPARISON AND CONTROL IN CLINICAL RESEARCH PROGRAM: ICD-10-CM

## 2021-08-19 ENCOUNTER — TELEPHONE (OUTPATIENT)
Dept: PHYSICAL MEDICINE AND REHAB | Age: 56
End: 2021-08-19

## 2021-08-19 NOTE — TELEPHONE ENCOUNTER
Pt. Called office inquiring about his Kyphoplasty. Informed that as of this time there is no date scheduled per Dr. Didi Rehman or Dr. Ying Mirza. Verbalized understanding. He wants to know the results of his knee xrays and is it possible he gets knee injections. Also, he got relief from his pain from the prednisone that you prescribed. He wants to know if you can prescribe another dosepak?   Please advise, Thanks

## 2021-08-19 NOTE — TELEPHONE ENCOUNTER
No more dose pack only like 3 times per year and only if not having other steroids  or steroid injections planned, will have to wait to have knee injections when we are offering injections again.  He will need a f/u to review knee XR's then order injections per insurance guidelines for billing

## 2021-08-19 NOTE — TELEPHONE ENCOUNTER
Pt. Contacted. Informed of Joyce's message. Verbalized understanding. Follow up scheduled 9/29/2021 @ 8:40am. Verbalized understanding.

## 2021-09-15 RX ORDER — DEXAMETHASONE SODIUM PHOSPHATE 4 MG/ML
4 INJECTION, SOLUTION INTRA-ARTICULAR; INTRALESIONAL; INTRAMUSCULAR; INTRAVENOUS; SOFT TISSUE ONCE
Status: CANCELLED | OUTPATIENT
Start: 2021-09-15 | End: 2021-09-15

## 2021-09-15 RX ORDER — BUPIVACAINE HYDROCHLORIDE 2.5 MG/ML
5 INJECTION, SOLUTION EPIDURAL; INFILTRATION; INTRACAUDAL ONCE
Status: CANCELLED | OUTPATIENT
Start: 2021-09-15 | End: 2021-09-15

## 2021-09-16 ENCOUNTER — HOSPITAL ENCOUNTER (OUTPATIENT)
Dept: INTERVENTIONAL RADIOLOGY/VASCULAR | Age: 56
Discharge: HOME | End: 2021-09-16
Payer: MEDICAID

## 2021-09-16 VITALS
BODY MASS INDEX: 38.3 KG/M2 | HEIGHT: 73 IN | DIASTOLIC BLOOD PRESSURE: 89 MMHG | TEMPERATURE: 98.4 F | RESPIRATION RATE: 18 BRPM | HEART RATE: 85 BPM | OXYGEN SATURATION: 98 % | SYSTOLIC BLOOD PRESSURE: 139 MMHG | WEIGHT: 289 LBS

## 2021-09-16 PROCEDURE — 62323 NJX INTERLAMINAR LMBR/SAC: CPT

## 2021-09-16 PROCEDURE — 2500000003 HC RX 250 WO HCPCS: Performed by: RADIOLOGY

## 2021-09-16 PROCEDURE — 6360000002 HC RX W HCPCS: Performed by: RADIOLOGY

## 2021-09-16 PROCEDURE — 2709999900 IR FLUORO GUIDED LUMBAR PUNCTURE THERAPY

## 2021-09-16 PROCEDURE — 6360000004 HC RX CONTRAST MEDICATION: Performed by: RADIOLOGY

## 2021-09-16 RX ORDER — BUPIVACAINE HYDROCHLORIDE 2.5 MG/ML
5 INJECTION, SOLUTION EPIDURAL; INFILTRATION; INTRACAUDAL ONCE
Status: COMPLETED | OUTPATIENT
Start: 2021-09-16 | End: 2021-09-16

## 2021-09-16 RX ORDER — DEXAMETHASONE SODIUM PHOSPHATE 4 MG/ML
4 INJECTION, SOLUTION INTRA-ARTICULAR; INTRALESIONAL; INTRAMUSCULAR; INTRAVENOUS; SOFT TISSUE ONCE
Status: COMPLETED | OUTPATIENT
Start: 2021-09-16 | End: 2021-09-16

## 2021-09-16 RX ADMIN — DEXAMETHASONE SODIUM PHOSPHATE 4 MG: 4 INJECTION, SOLUTION INTRAMUSCULAR; INTRAVENOUS at 08:57

## 2021-09-16 RX ADMIN — BUPIVACAINE HYDROCHLORIDE 2 ML: 2.5 INJECTION, SOLUTION EPIDURAL; INFILTRATION; INTRACAUDAL; PERINEURAL at 08:57

## 2021-09-16 RX ADMIN — IOHEXOL 1 ML: 180 INJECTION INTRAVENOUS at 08:57

## 2021-09-16 ASSESSMENT — PAIN SCALES - GENERAL
PAINLEVEL_OUTOF10: 0
PAINLEVEL_OUTOF10: 0

## 2021-09-16 ASSESSMENT — PAIN DESCRIPTION - DESCRIPTORS: DESCRIPTORS: ACHING;BURNING

## 2021-09-16 ASSESSMENT — PAIN - FUNCTIONAL ASSESSMENT: PAIN_FUNCTIONAL_ASSESSMENT: 0-10

## 2021-09-16 NOTE — H&P
Formulation and discussion of sedation / procedure plans, risks, benefits, side effects and alternatives with patient and/or responsible adult completed.     Electronically signed by Peyton Todd MD on 9/16/2021 at 8:59 AM

## 2021-09-16 NOTE — PROGRESS NOTES
7273 Patient received in IR for lumbar epidural. States his pain is rated 4/10 and its in his lower back and wraps around his sides and does go down his right leg. Denies any numbness or tingling in lower extremities. Pedal push and pull equal and weak. 5532 This procedure has been fully reviewed with the patient and written informed consent has been obtained. 4306 Procedure started with Chandrakant Bean Procedure completed; patient tolerated well. Band aid to lower back; no bleeding noted. 0900 Patient on cart; comfort ensured. 8924 Patient taken to OPN via cart.

## 2021-09-16 NOTE — OP NOTE
Department of Radiology  Post Procedure Progress Note    Pre-Procedure Diagnosis:  Lumbar radiculopathy     Procedure Performed:  Epidural block/steroid injection      Anesthesia: local     Findings: successful    Immediate Complications:  None    Estimated Blood Loss: minimal    SEE DICTATED PROCEDURE NOTE FOR COMPLETE DETAILS.       Electronically signed by Rosie Sim MD on 9/16/2021 at 9:00 AM

## 2021-09-28 ENCOUNTER — TELEPHONE (OUTPATIENT)
Dept: PHYSICAL MEDICINE AND REHAB | Age: 56
End: 2021-09-28

## 2021-09-28 NOTE — TELEPHONE ENCOUNTER
Per insurance guidelines has to have a f/u XR's and knee assessment. We have  only seen him 2 times and  that was for compression fracture. He can   go see Ortho and  they can offer knee injections in office  or I  can arrange with Dr Jenene Castleman when I  have a f/u with the pt.  Has he had PT for knee pain also, may need that before insurance approves injections

## 2021-09-28 NOTE — TELEPHONE ENCOUNTER
Pt. Called office. Has follow up 9/29/2021 @ 8:40am. States that he does not want to come to follow up if he doesn't have too because he is on limited disability but he wants to have knee injections.      Please advise, Thanks

## 2021-10-15 DIAGNOSIS — M48.56XA PATHOLOGICAL COMPRESSION FRACTURE OF LUMBAR VERTEBRA, INITIAL ENCOUNTER (HCC): ICD-10-CM

## 2021-10-15 DIAGNOSIS — M47.816 LUMBAR FACET ARTHROPATHY: ICD-10-CM

## 2021-10-15 DIAGNOSIS — Z86.39 HISTORY OF DIABETIC NEUROPATHY: ICD-10-CM

## 2021-10-15 DIAGNOSIS — Z41.9 ELECTIVE SURGICAL PROCEDURE: ICD-10-CM

## 2021-10-15 DIAGNOSIS — F10.10 ALCOHOL ABUSE: ICD-10-CM

## 2021-10-15 DIAGNOSIS — M54.9 INTRACTABLE BACK PAIN: ICD-10-CM

## 2021-10-15 DIAGNOSIS — M48.061 SPINAL STENOSIS OF LUMBAR REGION WITHOUT NEUROGENIC CLAUDICATION: ICD-10-CM

## 2021-10-15 DIAGNOSIS — M47.816 LUMBAR SPONDYLOSIS: ICD-10-CM

## 2021-10-18 RX ORDER — HYDROCODONE BITARTRATE AND ACETAMINOPHEN 5; 325 MG/1; MG/1
1 TABLET ORAL EVERY 8 HOURS PRN
Qty: 90 TABLET | Refills: 0 | Status: SHIPPED | OUTPATIENT
Start: 2021-10-18 | End: 2021-11-17

## 2021-10-18 NOTE — TELEPHONE ENCOUNTER
OARRS reviewed. UDS: + for Pregabalin. Last seen: 8/11/2021.  Follow-up:   Future Appointments   Date Time Provider Kaitlin Turcios   10/19/2021  8:20 AM AVRIL Valdivia - CNP [facility] [address]

## 2021-10-20 ENCOUNTER — OFFICE VISIT (OUTPATIENT)
Dept: PHYSICAL MEDICINE AND REHAB | Age: 56
End: 2021-10-20
Payer: MEDICAID

## 2021-10-20 VITALS
HEIGHT: 73 IN | WEIGHT: 289 LBS | DIASTOLIC BLOOD PRESSURE: 72 MMHG | SYSTOLIC BLOOD PRESSURE: 130 MMHG | BODY MASS INDEX: 38.3 KG/M2 | HEART RATE: 70 BPM

## 2021-10-20 DIAGNOSIS — M47.816 LUMBAR SPONDYLOSIS: ICD-10-CM

## 2021-10-20 DIAGNOSIS — M17.0 PRIMARY OSTEOARTHRITIS OF BOTH KNEES: Primary | ICD-10-CM

## 2021-10-20 DIAGNOSIS — M48.061 SPINAL STENOSIS OF LUMBAR REGION WITHOUT NEUROGENIC CLAUDICATION: ICD-10-CM

## 2021-10-20 DIAGNOSIS — M48.56XA PATHOLOGICAL COMPRESSION FRACTURE OF LUMBAR VERTEBRA, INITIAL ENCOUNTER (HCC): ICD-10-CM

## 2021-10-20 DIAGNOSIS — M47.816 LUMBAR FACET ARTHROPATHY: ICD-10-CM

## 2021-10-20 PROCEDURE — 1036F TOBACCO NON-USER: CPT | Performed by: NURSE PRACTITIONER

## 2021-10-20 PROCEDURE — G8484 FLU IMMUNIZE NO ADMIN: HCPCS | Performed by: NURSE PRACTITIONER

## 2021-10-20 PROCEDURE — G8427 DOCREV CUR MEDS BY ELIG CLIN: HCPCS | Performed by: NURSE PRACTITIONER

## 2021-10-20 PROCEDURE — G8417 CALC BMI ABV UP PARAM F/U: HCPCS | Performed by: NURSE PRACTITIONER

## 2021-10-20 PROCEDURE — 99214 OFFICE O/P EST MOD 30 MIN: CPT | Performed by: NURSE PRACTITIONER

## 2021-10-20 PROCEDURE — 3017F COLORECTAL CA SCREEN DOC REV: CPT | Performed by: NURSE PRACTITIONER

## 2021-10-20 ASSESSMENT — ENCOUNTER SYMPTOMS
EYE PAIN: 0
SORE THROAT: 0
RHINORRHEA: 0
BACK PAIN: 1
NAUSEA: 0
WHEEZING: 0
COUGH: 0
SINUS PRESSURE: 0
SHORTNESS OF BREATH: 0
CONSTIPATION: 0
ABDOMINAL PAIN: 0
VOMITING: 0
DIARRHEA: 0
CHEST TIGHTNESS: 0
COLOR CHANGE: 0
PHOTOPHOBIA: 0

## 2021-10-20 NOTE — PROGRESS NOTES
Nina Alatorre after he reviews the case. Discussed LESI L3  repeat Lumbar RFA he has had in past or TFLESI  SI MBB if need. His bilateral knee pain is constant increases with stairs standing bending walking. Pain increases with bending, lifting, twisting , turning torso, reaching, pushing, pulling, walking, standing, raising arms, stairs and getting up and down. Treatments Tried PT, ice, heat, NSAIDS, narcotics, muscle relaxer, OTC rubs creams patches, steroid burst and injections  Pain Description sharp, shooting, stabbing, burning, pressure, throbbing, aching, numbness, tingling and pins and needles      Medications reviewed. Patient denies side effects with medications. Patient states he is taking medications as prescribed. Hedenies receiving pain medications from other sources. He denies any ER visits since last visit. Pain scale with out pain medications or at its worst is 8/10. Walking   Pain scale with pain medications or at its best is 1/10.sitting   Last dose of Norco  was today Drug screen reviewed from 8/2021 and was WNL  Pill count was not completed today and patient was educated on bringing in medications Pt is out of meds:   Patient does not have naloxone available at home. Patient has not required use of naloxone at home since last office visit. Knee XRs  FINDINGS: There is no fracture or dislocation. Joint space narrowing and osteophyte formation are present at the medial and lateral compartments of the tibiofemoral joint and at the patellofemoral joint. Findings are most severe in the medial compartment    of the tibiofemoral joint where there is bone-on-bone appearance. A flabella is noted.           Impression   1. No fracture or dislocation. 2. Tricompartmental osteoarthritis. FINDINGS: There is no fracture or dislocation. Joint space narrowing and osteophyte formation are present at the medial and lateral compartments of the tibial femoral joint and at the patellofemoral joint.  Findings are most severe at the medial    compartment of the tibiofemoral joint where there is near bone-on-bone appearance. Soft tissues are unremarkable.           Impression   1. No fracture or dislocation. 2. Tricompartmental osteoarthritis. Lumbar          The patienthas No Known Allergies. Subjective:      Review of Systems   Constitutional: Positive for activity change. Negative for appetite change, chills, diaphoresis, fatigue, fever and unexpected weight change. HENT: Negative for congestion, ear pain, hearing loss, mouth sores, nosebleeds, rhinorrhea, sinus pressure and sore throat. Eyes: Negative for photophobia, pain and visual disturbance. Respiratory: Negative for cough, chest tightness, shortness of breath and wheezing. Cardiovascular: Negative for chest pain and palpitations. Gastrointestinal: Negative for abdominal pain, constipation, diarrhea, nausea and vomiting. Endocrine: Negative for cold intolerance, heat intolerance, polydipsia, polyphagia and polyuria. IDDM   Genitourinary: Negative for decreased urine volume, difficulty urinating, frequency and hematuria. Musculoskeletal: Positive for arthralgias, back pain, gait problem, myalgias, neck pain and neck stiffness. Negative for joint swelling. Skin: Negative for color change and rash. Allergic/Immunologic: Negative for food allergies and immunocompromised state. Neurological: Positive for seizures and numbness. Negative for dizziness, tremors, syncope, facial asymmetry, speech difficulty, weakness, light-headedness and headaches. Hematological: Does not bruise/bleed easily. Psychiatric/Behavioral: Positive for sleep disturbance. Negative for agitation, behavioral problems, confusion, decreased concentration, dysphoric mood, hallucinations, self-injury and suicidal ideas. The patient is nervous/anxious. The patient is not hyperactive.         Objective:     Vitals:    10/20/21 1117   BP: 130/72   Site: Left Upper Arm   Position: Sitting   Cuff Size: Large Adult   Pulse: 70   Weight: 289 lb (131.1 kg)   Height: 6' 1\" (1.854 m)       Physical Exam  Vitals and nursing note reviewed. Constitutional:       General: He is not in acute distress. Appearance: He is well-developed. He is not diaphoretic. HENT:      Head: Normocephalic and atraumatic. Right Ear: External ear normal.      Left Ear: External ear normal.      Nose: Nose normal.      Mouth/Throat:      Pharynx: No oropharyngeal exudate. Eyes:      General: No scleral icterus. Right eye: No discharge. Left eye: No discharge. Conjunctiva/sclera: Conjunctivae normal.      Pupils: Pupils are equal, round, and reactive to light. Neck:      Thyroid: No thyromegaly. Cardiovascular:      Rate and Rhythm: Normal rate and regular rhythm. Heart sounds: Normal heart sounds. No murmur heard. No friction rub. No gallop. Pulmonary:      Effort: Pulmonary effort is normal. No respiratory distress. Breath sounds: Normal breath sounds. No wheezing or rales. Chest:      Chest wall: No tenderness. Abdominal:      General: Bowel sounds are normal. There is no distension. Palpations: Abdomen is soft. Tenderness: There is no abdominal tenderness. There is no guarding or rebound. Musculoskeletal:      Cervical back: Full passive range of motion without pain, normal range of motion and neck supple. No edema, erythema, rigidity or tenderness. No muscular tenderness. Normal range of motion. Thoracic back: Tenderness present. Lumbar back: Tenderness present. Decreased range of motion. Back:       Right hip: No tenderness. Left hip: No tenderness. Right knee: Normal range of motion. No tenderness. Left knee: Tenderness present. Right ankle: No swelling. Legs:    Skin:     General: Skin is warm. Coloration: Skin is not pale. Findings: No erythema or rash.    Neurological: lumbar region without neurogenic claudication    5. Lumbar facet arthropathy            Plan:      · OARRS reviewed. Current MED: 16  · Patient was not offered naloxone for home. · Discussed long term side effects of medications, tolerance, dependency and addiction. · Previous UDS reviewed  · UDS preformed today for compliance. · Patient told can not receive any pain medications from any other source. · No evidence of abuse, diversion or aberrant behavior.  Medications and/or procedures to improve function and quality of life- patient understanding with this and that may not be pain free   Discussed with patient about safe storage of medications at home   Discussed possible weaning of medication dosing dependent on treatment/procedure results.  Testing: reviewed Lumbar knee images   Procedures: Bilateral knee steroid injections right side first    Discussed with patient about risks with procedure including infection, reaction to medication, increased pain, or bleeding.  Medications:Norco Lyrica Pt must bring pills in for count by Friday  12 noon   If patient is on blood thinners will need approval to hold Aspirin     After pt left I asked Dr Christen Batres office if he had a consult for Kyphoplasty  and they said pt no showed to his kyphoplasty consult  10/14/2021. I called pt to confirm this and he said oh ya he forgot. I  told him he may be discharged for noncompliance     Meds. Prescribed:   No orders of the defined types were placed in this encounter. Return for Bilateral knee steroid injection right side first eval with Dr Jigar Osullivan in officeF/U Missy8-10 weeks.                Electronically signed by AVRIL Colbert CNP on10/20/2021 at 1:51 PM

## 2021-10-20 NOTE — PROGRESS NOTES
Please set patient up for office visit for potential knee injections.     Reagan Duggan, DO  Interventional Pain Management/PM&R   [place]

## 2021-10-21 ENCOUNTER — TELEPHONE (OUTPATIENT)
Dept: PHYSICAL MEDICINE AND REHAB | Age: 56
End: 2021-10-21

## 2021-10-21 NOTE — TELEPHONE ENCOUNTER
Spoke to pt to remind him that he does need to be in office by 11:30 10/22/21 for is medication count. Advise to call Dr Radha Appiah office to see if he can reschedule his appt.

## 2021-11-12 ENCOUNTER — TELEPHONE (OUTPATIENT)
Dept: PHYSICAL MEDICINE AND REHAB | Age: 56
End: 2021-11-12

## 2021-11-12 ENCOUNTER — OFFICE VISIT (OUTPATIENT)
Dept: PHYSICAL MEDICINE AND REHAB | Age: 56
End: 2021-11-12
Payer: MEDICAID

## 2021-11-12 VITALS
DIASTOLIC BLOOD PRESSURE: 82 MMHG | OXYGEN SATURATION: 100 % | HEIGHT: 73 IN | SYSTOLIC BLOOD PRESSURE: 138 MMHG | WEIGHT: 275 LBS | BODY MASS INDEX: 36.45 KG/M2 | HEART RATE: 86 BPM

## 2021-11-12 DIAGNOSIS — G89.29 OTHER CHRONIC PAIN: ICD-10-CM

## 2021-11-12 DIAGNOSIS — M47.816 LUMBAR SPONDYLOSIS: Primary | ICD-10-CM

## 2021-11-12 DIAGNOSIS — M48.56XA PATHOLOGICAL COMPRESSION FRACTURE OF LUMBAR VERTEBRA, INITIAL ENCOUNTER (HCC): ICD-10-CM

## 2021-11-12 DIAGNOSIS — G89.29 CHRONIC PAIN OF RIGHT KNEE: ICD-10-CM

## 2021-11-12 DIAGNOSIS — M25.561 CHRONIC PAIN OF RIGHT KNEE: ICD-10-CM

## 2021-11-12 PROCEDURE — 3017F COLORECTAL CA SCREEN DOC REV: CPT | Performed by: ANESTHESIOLOGY

## 2021-11-12 PROCEDURE — 1036F TOBACCO NON-USER: CPT | Performed by: ANESTHESIOLOGY

## 2021-11-12 PROCEDURE — 20610 DRAIN/INJ JOINT/BURSA W/O US: CPT | Performed by: ANESTHESIOLOGY

## 2021-11-12 PROCEDURE — G8427 DOCREV CUR MEDS BY ELIG CLIN: HCPCS | Performed by: ANESTHESIOLOGY

## 2021-11-12 PROCEDURE — G8484 FLU IMMUNIZE NO ADMIN: HCPCS | Performed by: ANESTHESIOLOGY

## 2021-11-12 PROCEDURE — 99215 OFFICE O/P EST HI 40 MIN: CPT | Performed by: ANESTHESIOLOGY

## 2021-11-12 PROCEDURE — G8417 CALC BMI ABV UP PARAM F/U: HCPCS | Performed by: ANESTHESIOLOGY

## 2021-11-12 RX ORDER — METHYLPREDNISOLONE ACETATE 40 MG/ML
40 INJECTION, SUSPENSION INTRA-ARTICULAR; INTRALESIONAL; INTRAMUSCULAR; SOFT TISSUE ONCE
Status: COMPLETED | OUTPATIENT
Start: 2021-11-12 | End: 2021-11-15

## 2021-11-12 NOTE — TELEPHONE ENCOUNTER
Pt. In office. Unable to stay to schedule procedure due to another appointment with wife. Procedure and follow up scheduled. Advised to call when available to go over date/time and  Instructions. Verbalized understanding.

## 2021-11-12 NOTE — PROGRESS NOTES
children: Not on file    Years of education: Not on file    Highest education level: Not on file   Occupational History    Not on file   Tobacco Use    Smoking status: Never Smoker    Smokeless tobacco: Never Used   Vaping Use    Vaping Use: Never used   Substance and Sexual Activity    Alcohol use: Not Currently    Drug use: Not Currently    Sexual activity: Yes   Other Topics Concern    Not on file   Social History Narrative    Not on file     Social Determinants of Health     Financial Resource Strain:     Difficulty of Paying Living Expenses: Not on file   Food Insecurity:     Worried About Running Out of Food in the Last Year: Not on file    Stew of Food in the Last Year: Not on file   Transportation Needs:     Lack of Transportation (Medical): Not on file    Lack of Transportation (Non-Medical):  Not on file   Physical Activity:     Days of Exercise per Week: Not on file    Minutes of Exercise per Session: Not on file   Stress:     Feeling of Stress : Not on file   Social Connections:     Frequency of Communication with Friends and Family: Not on file    Frequency of Social Gatherings with Friends and Family: Not on file    Attends Anabaptism Services: Not on file    Active Member of [address] [place] [address] or Organizations: Not on file    Attends Club or Organization Meetings: Not on file    Marital Status: Not on file   Intimate Partner Violence:     Fear of Current or Ex-Partner: Not on file    Emotionally Abused: Not on file    Physically Abused: Not on file    Sexually Abused: Not on file   Housing Stability:     Unable to Pay for Housing in the Last Year: Not on file    Number of [place] in the Last Year: Not on file    Unstable Housing in the Last Year: Not on file       Medications & Allergies:   Current Outpatient Medications   Medication Instructions    amLODIPine (NORVASC) 5 mg, Oral, DAILY    aspirin 81 mg, Oral, DAILY    FLUoxetine (PROZAC) 10 mg, Oral, DAILY    glimepiride clonus  Skin: No rashes or lesions present   Psychological: Cooperative, no exaggerated pain behaviors       Assessment:    Diagnosis Orders   1. Lumbar spondylosis  CHG FLUOR NEEDLE/CATH SPINE/PARASPINAL DX/THER ADDON    [place] INJ DX/THER AGNT PARAVERT FACET JOINT, LUMBAR/SAC, 1ST LEVEL    [place] INJ DX/THER AGNT PARAVERT FACET JOINT, LUMBAR/SAC, 2ND LEVEL   2. Pathological compression fracture of lumbar vertebra, initial encounter ([facility] [address]     3. Chronic pain of right knee     4. Other chronic pain           Annelise Gillespie is a 55 y. o.male presenting to the pain clinic for evaluation of chronic low back and knee pain. His clinical history and physical lamination are consistent with lumbar facet mediated pain secondary to an L1 compression fracture. In addition, he has bilateral knee pain secondary advanced osteoarthritis. Patient underwent right intra-articular knee injection today in clinic. I have set the patient up for diagnostic lumbar medial branch blocks targeting bilateral facet joints of L2/L3 and L3/L4. We will likely pursue a lumbar RFA in the near future. Plan: The following treatment recommendations and plan were discussed in detail with Annelise Gillespie. Imaging:   I have reviewed patients imaging of MRI L-spine and results were discussed with patient today. Interventions: In presence of lumbar axial back pain and with physical exam consistent for facetal pain secondary to L1 vertebral body compression fracture, the option of medial branch blocks at bilateral L2/L3 and L3/L4 was chosen. The risks and benefits were discussed in detail with the patient. Patient wants to proceed with the injection. Anticoagulation/NPO Recommendations:   Patient does not need to hold any medications prior to the procedure. Patient does not need to be NPO prior to the procedure. We will perform a LOCAL injection.     Multidisciplinary Pain Management:   In the presence of complex, chronic, and multi-factorial pain, the importance of a multidisciplinary approach to pain management in the patients management regimen was emphasized and discussed in great detail. Referrals:  None    Prescriptions Written This Visit:   None    Follow-up: For lumbar MBBs    I spent 45 minutes with the patient and greater than 50% of this time was spent establishing care (patient new to me), discussing his clinical diagnosis of osteoarthritis of knee and risk versus benefits of intra-articular knee injection with steroid in the setting of being a diabetic, discussion of low back pain and lumbar medial branch blocks and radiofrequency ablation in the setting of a lumbar vertebral body compression fracture.       Marcela Leon,   Interventional Pain Management/PM&R   [place]

## 2021-11-12 NOTE — PROGRESS NOTES
Pre-operative Diagnosis:  Right knee pain     Post-operative Diagnosis: Right knee pain     Procedure: Right intra-articular knee injection     Procedure Description:  After having signed the informed consent, the patient was seated in a chair. The patient's Right knee region was prepped with alcohol wipes. Moving lateral to the patella tendon and superior to the lateral tibial plateau a 25 G 3 1/2 inch spinal needle was advanced in a posterior medial direction until reaching the knee joint. After negative aspiration 40 mg Depo-Medrol mixed with 4 cc of 0.5% bupivacaine were injected in the joint. The patient tolerated the procedure well.     Procedural Complications: None        Marty Stern, DO  Interventional Pain Management/PM&R   [place]

## 2021-11-15 RX ADMIN — METHYLPREDNISOLONE ACETATE 40 MG: 40 INJECTION, SUSPENSION INTRA-ARTICULAR; INTRALESIONAL; INTRAMUSCULAR; SOFT TISSUE at 08:27

## 2021-11-15 NOTE — TELEPHONE ENCOUNTER
Pt. Contacted. Informed of procedure and follow updates and times. Educated on pre-procedure instructions, also mailed. Verbalized understanding.

## 2021-11-19 ENCOUNTER — PREP FOR PROCEDURE (OUTPATIENT)
Dept: PHYSICAL MEDICINE AND REHAB | Age: 56
End: 2021-11-19

## 2021-12-10 ENCOUNTER — PREP FOR PROCEDURE (OUTPATIENT)
Dept: PHYSICAL MEDICINE AND REHAB | Age: 56
End: 2021-12-10

## 2021-12-13 NOTE — H&P
Today, patient presents for planned medial branch blocks at bilateral L2/L3 and L3/L4. This note is reflective of the patient's previous visit for evaluation. We will proceed with today's planned procedure. Since patient's last visit for evaluation, there have been no interval changes in medical history. Patient has no new numbness, weakness, or focal neurological deficit since evaluation. Patient has no contraindications to injection (no anticoagulation or recent antibiotic intake for active infections), and has a  present or is able to drive themselves (as discussed and cleared by physician). Allergies to latex, contrast dye, and steroid medications have been confirmed with the patient prior to the procedure. NPO necessity has been assessed and accepted based on procedure complexity. The risks and benefits of the procedure have been explained including but are not limited to infection, bleeding, paralysis, immediate post procedure weakness, and dizziness; the patient acknowledges understanding and desires to proceed with the procedure. Patient has signed consent for same procedure as discussed in previous clinic encounter. All other questions and concerns were addressed at bedside. See procedure note for full details. Post procedure Instructions: The patient was advised not to drive during the day of the procedure and not to engage in any significant decision making (unless otherwise states by physician). The patient was also advised to be cautious with walking/activity for 24 hours following today's visit and asked not to engage in over-exertion (unless otherwise states by physician). After this time, it is ok to resume pre-procedure level of activity. Patient advised to apply ice to site of injection in situations of pain and discomfort. Patient advised to not submerge site of injection during bath or pool activities for approximately 24 hours post-procedure.  Patient attested to understanding post procedure directions / restrictions. All other questions and concerns addressed before patient discharge in ambulatory fashion. Chronic Pain/PM&R Clinic Note     Encounter Date: 11/12/21    Subjective:   Chief Complaint:   No chief complaint on file. History of Present Illness:   Paulo Oscar is a 55 y.o. male seen in the clinic initially on 12/13/21 for his history of chronic knee pain. He has a medical history of anxiety/depression and T2DM is s/p multiple toe amputations. He has longstanding history of bilateral knee pain for the last several years. He endorses the majority of pain to be on the medial and lateral aspect of the knees at the joint line. He states that his knee pain is a constant 5/10 aching, stabbing pain. His knee pain is worse with any activities where he is trying to go up and down stairs and really has a difficult time being on his legs for an extended duration of time. He denies any further radiation down his legs. He does have some low back pain was found to have an L1 compression fracture several months ago and states that he did not undergo a kyphoplasty but is wondering how this can be addressed. He describes the majority of his back pain to be axial in nature slightly above his waistline. This pain is described as a constant aching, nagging, stabbing pain that he rates as a constant 6/10 pain. He denies any pain that radiates further down his legs, associated leg weakness, leg paresthesias, saddle anesthesia, bowel/bladder incontinence.       Past Medical History:   Diagnosis Date    Anxiety     Depression     [facility] ([facility] [address]     Hypertension     Seizures ([facility] [address]        Past Surgical History:   Procedure Laterality Date    KNEE ARTHROSCOPY Bilateral     LEG SURGERY      TOE AMPUTATION Bilateral     multiple toes amputation       Family History   Problem Relation Age of Onset    Hypertension Mother     Diabetes Mother        Social History Socioeconomic History    Marital status: Single     Spouse name: Not on file    Number of children: Not on file    Years of education: Not on file    Highest education level: Not on file   Occupational History    Not on file   Tobacco Use    Smoking status: Never Smoker    Smokeless tobacco: Never Used   Vaping Use    Vaping Use: Never used   Substance and Sexual Activity    Alcohol use: Not Currently    Drug use: Not Currently    Sexual activity: Yes   Other Topics Concern    Not on file   Social History Narrative    Not on file     Social Determinants of Health     Financial Resource Strain:     Difficulty of Paying Living Expenses: Not on file   Food Insecurity:     Worried About Running Out of Food in the Last Year: Not on file    Stew of Food in the Last Year: Not on file   Transportation Needs:     Lack of Transportation (Medical): Not on file    Lack of Transportation (Non-Medical):  Not on file   Physical Activity:     Days of Exercise per Week: Not on file    Minutes of Exercise per Session: Not on file   Stress:     Feeling of Stress : Not on file   Social Connections:     Frequency of Communication with Friends and Family: Not on file    Frequency of Social Gatherings with Friends and Family: Not on file    Attends Mosque Services: Not on file    Active Member of [address] [place] [address] [employer] or Organizations: Not on file    Attends Club or Organization Meetings: Not on file    Marital Status: Not on file   Intimate Partner Violence:     Fear of Current or Ex-Partner: Not on file    Emotionally Abused: Not on file    Physically Abused: Not on file    Sexually Abused: Not on file   Housing Stability:     Unable to Pay for Housing in the Last Year: Not on file    Number of [place] in the Last Year: Not on file    Unstable Housing in the Last Year: Not on file       Medications & Allergies:   Current Outpatient Medications   Medication Instructions    amLODIPine (NORVASC) 5 mg, Oral, DAILY    aspirin 81 mg, Oral, DAILY    FLUoxetine (PROZAC) 10 mg, Oral, DAILY    glimepiride (AMARYL) 6 mg, Oral, EVERY MORNING    insulin lispro (HUMALOG) 0-15 Units, SubCUTAneous, 3 TIMES DAILY BEFORE MEALS    levETIRAcetam (KEPPRA) 500 mg, Oral, 2 TIMES DAILY    lisinopril (PRINIVIL;ZESTRIL) 10 mg, Oral, DAILY    pregabalin (LYRICA) 75 mg, Oral, 3 TIMES DAILY       No Known Allergies    Review of Systems:   Constitutional: negative for weight changes or fevers  Genitourinary: negative for bowel/bladder incontinence   Musculoskeletal: positive for low back pain and bilateral knee pain  Neurological: negative for any leg weakness or numbness/tingling  Behavioral/Psych: negative for anxiety/depression   All other systems reviewed and are negative    Objective: There were no vitals filed for this visit. Constitutional: Pleasant, no acute distress   Head: Normocephalic, atraumatic   Eyes: Conjunctivae normal   Neck: Supple, symmetrical   Lungs: Normal respiratory effort, non-labored breathing   Cardiovascular: Limbs warm and well perfused   Abdomen: Non-protruded   Musculoskeletal: Muscle bulk symmetric, no atrophy, no gross deformities   · Lower Extremities: ROM WNL. · Thorax: No paraspinal tenderness bilaterally. No scoliosis or kyphosis. · Lumbar Spine: ROM reduced in extension. Lumbar paraspinals tender to palpation bilaterally. SLR neg bilaterally. Positive facet loading bilaterally. - B/L Knees: TTP over medial and lateral joint lines. No appreciable fluid in knees. Ligamentous structures intact. Neurological: Cranial nerves II-XII grossly intact. · Gait - Antalgic gait. Ambulates without assistive device.    · Motor: 5/5 muscle strength in bilateral hip flexion, knee flexion, knee extension, ankle dorsiflexion, and ankle plantar flexion   · Sensory: LT sensation intact in lower limbs   · Reflexes: 2+ symmetrical in bilateral achilles, 2+ bilateral patellar, negative ankle clonus  Skin: No rashes or lesions present   Psychological: Cooperative, no exaggerated pain behaviors       Assessment:    Diagnosis Orders   1. Lumbar spondylosis  CHG FLUOR NEEDLE/CATH SPINE/PARASPINAL DX/THER ADDON    SC INJ DX/THER AGNT PARAVERT FACET JOINT, LUMBAR/SAC, 1ST LEVEL    SC INJ DX/THER AGNT PARAVERT FACET JOINT, LUMBAR/SAC, 2ND LEVEL   2. Pathological compression fracture of lumbar vertebra, initial encounter ([facility] [address]     3. Chronic pain of right knee     4. Other chronic pain           Blanche Chadwick is a 55 y. o.male presenting to the pain clinic for evaluation of chronic low back and knee pain. His clinical history and physical lamination are consistent with lumbar facet mediated pain secondary to an L1 compression fracture. In addition, he has bilateral knee pain secondary advanced osteoarthritis. Patient underwent right intra-articular knee injection today in clinic. I have set the patient up for diagnostic lumbar medial branch blocks targeting bilateral facet joints of L2/L3 and L3/L4. We will likely pursue a lumbar RFA in the near future. Plan: The following treatment recommendations and plan were discussed in detail with Blanche Chadwick. Imaging:   I have reviewed patients imaging of MRI L-spine and results were discussed with patient today. Interventions: In presence of lumbar axial back pain and with physical exam consistent for facetal pain secondary to L1 vertebral body compression fracture, the option of medial branch blocks at bilateral L2/L3 and L3/L4 was chosen. The risks and benefits were discussed in detail with the patient. Patient wants to proceed with the injection. Anticoagulation/NPO Recommendations:   Patient does not need to hold any medications prior to the procedure. Patient does not need to be NPO prior to the procedure. We will perform a LOCAL injection.     Multidisciplinary Pain Management:   In the presence of complex, chronic, and multi-factorial pain, the importance of a multidisciplinary approach to pain management in the patients management regimen was emphasized and discussed in great detail. Referrals:  None    Prescriptions Written This Visit:   None    Follow-up: For lumbar MBBs    I spent 45 minutes with the patient and greater than 50% of this time was spent establishing care (patient new to me), discussing his clinical diagnosis of osteoarthritis of knee and risk versus benefits of intra-articular knee injection with steroid in the setting of being a diabetic, discussion of low back pain and lumbar medial branch blocks and radiofrequency ablation in the setting of a lumbar vertebral body compression fracture.       Kin Barboza, DO  Interventional Pain Management/PM&R   [place]

## 2021-12-14 ENCOUNTER — APPOINTMENT (OUTPATIENT)
Dept: GENERAL RADIOLOGY | Age: 56
End: 2021-12-14
Attending: ANESTHESIOLOGY
Payer: MEDICAID

## 2021-12-14 ENCOUNTER — HOSPITAL ENCOUNTER (OUTPATIENT)
Age: 56
Setting detail: OUTPATIENT SURGERY
Discharge: HOME | End: 2021-12-14
Attending: ANESTHESIOLOGY | Admitting: ANESTHESIOLOGY
Payer: MEDICAID

## 2021-12-14 VITALS
HEIGHT: 73 IN | TEMPERATURE: 97 F | DIASTOLIC BLOOD PRESSURE: 75 MMHG | SYSTOLIC BLOOD PRESSURE: 134 MMHG | OXYGEN SATURATION: 94 % | RESPIRATION RATE: 16 BRPM | BODY MASS INDEX: 40.32 KG/M2 | WEIGHT: 304.2 LBS | HEART RATE: 82 BPM

## 2021-12-14 PROCEDURE — 2709999900 HC NON-CHARGEABLE SUPPLY: Performed by: ANESTHESIOLOGY

## 2021-12-14 PROCEDURE — 7100000010 HC PHASE II RECOVERY - FIRST 15 MIN: Performed by: ANESTHESIOLOGY

## 2021-12-14 PROCEDURE — 3209999900 FLUORO FOR SURGICAL PROCEDURES

## 2021-12-14 PROCEDURE — 64493 INJ PARAVERT F JNT L/S 1 LEV: CPT | Performed by: ANESTHESIOLOGY

## 2021-12-14 PROCEDURE — 2500000003 HC RX 250 WO HCPCS: Performed by: ANESTHESIOLOGY

## 2021-12-14 PROCEDURE — 3600000056 HC PAIN LEVEL 4 BASE: Performed by: ANESTHESIOLOGY

## 2021-12-14 PROCEDURE — 64494 INJ PARAVERT F JNT L/S 2 LEV: CPT | Performed by: ANESTHESIOLOGY

## 2021-12-14 RX ORDER — LIDOCAINE HYDROCHLORIDE 10 MG/ML
INJECTION, SOLUTION EPIDURAL; INFILTRATION; INTRACAUDAL; PERINEURAL PRN
Status: DISCONTINUED | OUTPATIENT
Start: 2021-12-14 | End: 2021-12-14 | Stop reason: ALTCHOICE

## 2021-12-14 RX ORDER — BUPIVACAINE HYDROCHLORIDE 5 MG/ML
INJECTION, SOLUTION PERINEURAL PRN
Status: DISCONTINUED | OUTPATIENT
Start: 2021-12-14 | End: 2021-12-14 | Stop reason: ALTCHOICE

## 2021-12-14 ASSESSMENT — PAIN DESCRIPTION - DESCRIPTORS: DESCRIPTORS: ACHING

## 2021-12-14 ASSESSMENT — PAIN SCALES - GENERAL: PAINLEVEL_OUTOF10: 0

## 2021-12-14 ASSESSMENT — PAIN - FUNCTIONAL ASSESSMENT: PAIN_FUNCTIONAL_ASSESSMENT: 0-10

## 2021-12-14 NOTE — PROCEDURES
Pre-operative Diagnosis: Lumbar facet pain     Post-operative Diagnosis: Lumbar facet pain     Procedure: Bilateral lumbar medial branch blocks targeting facet joints of L2/L3 and L3/L4     Procedure Description:  After consent was obtained, the patient was placed in the prone position having pressure points appropriately padded. The lower back was prepped with chloraprep and draped in a sterile fashion. 0.5 cc of 1 % lidocaine was used for local anesthesia of the skin and superficial subcutaneous tissues. Three 22-gauge 3.5-inch needles were advanced under fluoroscopy in an AP view with caudad angulation: at the junction of the right superior articular process and the transverse process of the L2, L3, and L4 vertebrae. There was no paresthesias, heme, or CSF obtained. Needle placement was confirmed using AP and oblique views. Then 0.5 cc of 0.5% bupivacaine was injected at the site without complications. The procedure was repeated on the left side. The patient tolerated the procedure well, transported to the recovery room, and observed for 15 minutes prior to being discharged in ambulatory fashion.      Procedural Complications: None  Estimated Blood Loss: 0 mL       Marty Worrell DO  Interventional Pain Management/PM&R   [facility]

## 2021-12-14 NOTE — PROGRESS NOTES
1351-Patient to Phase II via cart. Report received from [facility]. Patient awake and alert-local. Vitals obtained and stable. Respirations even and unlabored on room air. Patient denies pain, nausea, numbness and tingling. Patient able to move all extremities. No drainage noted at injection sites. Patient getting dressed at bedside. Instructed on call light use. 1400-Patient discharged in stable condition with responsible . All belongings given to patient. Patient ambulated to car with assistance from RN. Patient tolerated well.

## 2022-01-05 ENCOUNTER — OFFICE VISIT (OUTPATIENT)
Dept: PHYSICAL MEDICINE AND REHAB | Age: 57
End: 2022-01-05
Payer: MEDICAID

## 2022-01-05 VITALS
WEIGHT: 304 LBS | HEART RATE: 78 BPM | HEIGHT: 73 IN | BODY MASS INDEX: 40.29 KG/M2 | SYSTOLIC BLOOD PRESSURE: 134 MMHG | DIASTOLIC BLOOD PRESSURE: 72 MMHG

## 2022-01-05 DIAGNOSIS — M48.56XA PATHOLOGICAL COMPRESSION FRACTURE OF LUMBAR VERTEBRA, INITIAL ENCOUNTER (HCC): ICD-10-CM

## 2022-01-05 DIAGNOSIS — M47.816 LUMBAR SPONDYLOSIS: Primary | ICD-10-CM

## 2022-01-05 DIAGNOSIS — M47.816 LUMBAR FACET ARTHROPATHY: ICD-10-CM

## 2022-01-05 DIAGNOSIS — M17.0 PRIMARY OSTEOARTHRITIS OF BOTH KNEES: ICD-10-CM

## 2022-01-05 PROCEDURE — 99214 OFFICE O/P EST MOD 30 MIN: CPT | Performed by: NURSE PRACTITIONER

## 2022-01-05 PROCEDURE — G8484 FLU IMMUNIZE NO ADMIN: HCPCS | Performed by: NURSE PRACTITIONER

## 2022-01-05 PROCEDURE — G8427 DOCREV CUR MEDS BY ELIG CLIN: HCPCS | Performed by: NURSE PRACTITIONER

## 2022-01-05 PROCEDURE — 3017F COLORECTAL CA SCREEN DOC REV: CPT | Performed by: NURSE PRACTITIONER

## 2022-01-05 PROCEDURE — G8417 CALC BMI ABV UP PARAM F/U: HCPCS | Performed by: NURSE PRACTITIONER

## 2022-01-05 PROCEDURE — 1036F TOBACCO NON-USER: CPT | Performed by: NURSE PRACTITIONER

## 2022-01-05 ASSESSMENT — ENCOUNTER SYMPTOMS
ABDOMINAL PAIN: 0
COUGH: 0
RHINORRHEA: 0
PHOTOPHOBIA: 0
WHEEZING: 0
SINUS PRESSURE: 0
SHORTNESS OF BREATH: 0
CHEST TIGHTNESS: 0
NAUSEA: 0
CONSTIPATION: 0
SORE THROAT: 0
COLOR CHANGE: 0
VOMITING: 0
EYE PAIN: 0
BACK PAIN: 1
DIARRHEA: 0

## 2022-01-05 NOTE — PROGRESS NOTES
[address] [facility] [place] [address]  Dept: [phone]  Dept Fax: [phone]: [phone]    Visit Date: 1/5/2022    Functionality Assessment/Goals Worksheet     On a scale of 0 (Does not Interfere) to 10 (Completely Interferes)     1. Which number describes how during the past week pain has interfered with       the following:  A. General Activity:  9  B. Mood: 3  C. Walking Ability:  9  D. Normal Work (Includes both work outside the home and housework):  9  E. Relations with Other People:   3  F. Sleep:   4  G. Enjoyment of Life:   7    2. Patient Prefers to Take their Pain Medications:     []  On a regular basis   [x]  Only when necessary    []  Does not take pain medications    3. What are the Patient's Goals/Expectations for Visiting Pain Management? []  Learn about my pain    []  Receive Medication   []  Physical Therapy     []  Treat Depression   [x]  Receive Injections    []  Treat Sleep   []  Deal with Anxiety and Stress   []  Treat Opoid Dependence/Addiction   []  Other:    HPI:   Olvin Mercado is a 55 y.o. male is here today for    Chief Complaint: Low back pain and Knee pain     HPI   F/U Right knee steroid injection with Dr Nicol Terry 11/12/2021 receiving about 40% pain relief or benefit. He is able to stand and walk longer. F/U Lumbar facet MBB@ L2-3,3-4 Bilateral 12/14/2021 with Dr Nicol Terry states he received about 80% pain relief or benefit for 2-3 days. Has had LESI  L4-5  Right  9/16/2021  Dr Nicholas Avina had Dr Mica Dotson complete,  states absolutely no relief or benefit. Dr Adolfo Thomas was given info on pt for Kyphoplasty for which is now a chronic  compression fracture, pt has not had procedure as of yet due to multiple reschedules,cancellations, abnormal labs, MRI scheduling issues. Will now offer Facet injection series.      His bilateral knee pain is constant increases with stairs standing bending walking. He did not come in for pill count will no longer prescribe Narcotics. Pain increases with bending, lifting, walking, standing, stairs, getting up and down and housework or working at job. Treatments Tried ice, heat, narcotics, muscle relaxer, OTC rubs creams patches, steroid burst and injections  Pain Description stabbing, burning and aching    Medications reviewed. Patient denies side effects with medications. Patient states he is taking medications as prescribed. Hedenies receiving pain medications from other sources. He denies any ER visits since last visit. Pain scale with out pain medications or at its worst is 8/10.standing  Pain scale with pain medications or at its best is 2/10. Knee XRs  FINDINGS: There is no fracture or dislocation. Joint space narrowing and osteophyte formation are present at the medial and lateral compartments of the tibiofemoral joint and at the patellofemoral joint. Findings are most severe in the medial compartment    of the tibiofemoral joint where there is bone-on-bone appearance. A flabella is noted.           Impression   1. No fracture or dislocation. 2. Tricompartmental osteoarthritis. FINDINGS: There is no fracture or dislocation. Joint space narrowing and osteophyte formation are present at the medial and lateral compartments of the tibial femoral joint and at the patellofemoral joint. Findings are most severe at the medial    compartment of the tibiofemoral joint where there is near bone-on-bone appearance. Soft tissues are unremarkable.           Impression   1. No fracture or dislocation. 2. Tricompartmental osteoarthritis. Lumbar         The patienthas No Known Allergies. Subjective:      Review of Systems   Constitutional: Positive for activity change. Negative for appetite change, chills, diaphoresis, fatigue, fever and unexpected weight change.    HENT: Negative for congestion, ear pain, hearing loss, mouth sores, nosebleeds, rhinorrhea, sinus pressure and sore throat. Eyes: Negative for photophobia, pain and visual disturbance. Respiratory: Negative for cough, chest tightness, shortness of breath and wheezing. Cardiovascular: Negative for chest pain and palpitations. Gastrointestinal: Negative for abdominal pain, constipation, diarrhea, nausea and vomiting. Endocrine: Negative for cold intolerance, heat intolerance, polydipsia, polyphagia and polyuria. IDDM   Genitourinary: Negative for decreased urine volume, difficulty urinating, frequency and hematuria. Musculoskeletal: Positive for arthralgias, back pain, gait problem, myalgias, neck pain and neck stiffness. Negative for joint swelling. Skin: Negative for color change and rash. Allergic/Immunologic: Negative for food allergies and immunocompromised state. Neurological: Positive for seizures and numbness. Negative for dizziness, tremors, syncope, facial asymmetry, speech difficulty, weakness, light-headedness and headaches. Hematological: Does not bruise/bleed easily. Psychiatric/Behavioral: Positive for sleep disturbance. Negative for agitation, behavioral problems, confusion, decreased concentration, dysphoric mood, hallucinations, self-injury and suicidal ideas. The patient is nervous/anxious. The patient is not hyperactive. Objective:     Vitals:    01/05/22 0921   BP: 134/72   Site: Left Upper Arm   Position: Sitting   Cuff Size: Large Adult   Pulse: 78   Weight: (!) 304 lb (137.9 kg)   Height: 6' 1\" (1.854 m)       Physical Exam  Vitals and nursing note reviewed. Constitutional:       General: He is not in acute distress. Appearance: He is well-developed. He is not diaphoretic. HENT:      Head: Normocephalic and atraumatic. Right Ear: External ear normal.      Left Ear: External ear normal.      Nose: Nose normal.      Mouth/Throat:      Pharynx: No oropharyngeal exudate.    Eyes:      General: No scleral icterus. Right eye: No discharge. Left eye: No discharge. Conjunctiva/sclera: Conjunctivae normal.      Pupils: Pupils are equal, round, and reactive to light. Neck:      Thyroid: No thyromegaly. Cardiovascular:      Rate and Rhythm: Normal rate and regular rhythm. Heart sounds: Normal heart sounds. No murmur heard. No friction rub. No gallop. Pulmonary:      Effort: Pulmonary effort is normal. No respiratory distress. Breath sounds: Normal breath sounds. No wheezing or rales. Chest:      Chest wall: No tenderness. Abdominal:      General: Bowel sounds are normal. There is no distension. Palpations: Abdomen is soft. Tenderness: There is no abdominal tenderness. There is no guarding or rebound. Musculoskeletal:      Cervical back: Full passive range of motion without pain, normal range of motion and neck supple. No edema, erythema, rigidity or tenderness. No muscular tenderness. Normal range of motion. Thoracic back: Tenderness present. Lumbar back: Tenderness present. Decreased range of motion. Back:       Right hip: No tenderness. Left hip: No tenderness. Right knee: Bony tenderness and crepitus present. Decreased range of motion. Tenderness present over the medial joint line. Left knee: Bony tenderness and crepitus present. Decreased range of motion. Tenderness present over the medial joint line. Right ankle: No swelling. Legs:    Skin:     General: Skin is warm. Coloration: Skin is not pale. Findings: No erythema or rash. Neurological:      Mental Status: He is alert and oriented to person, place, and time. He is not disoriented. Cranial Nerves: No cranial nerve deficit. Sensory: Sensory deficit present. Motor: No weakness, atrophy or abnormal muscle tone. Coordination: Coordination normal.      Gait: Gait abnormal.      Deep Tendon Reflexes: Reflexes are normal and symmetric. Babinski sign absent on the right side. Reflex Scores:       Tricep reflexes are 2+ on the right side and 2+ on the left side. Bicep reflexes are 2+ on the right side and 2+ on the left side. Brachioradialis reflexes are 2+ on the right side and 2+ on the left side. Patellar reflexes are 2+ on the right side. Achilles reflexes are 2+ on the right side. Comments: SENSORY- decreased right foot. SLR neg   Psychiatric:         Attention and Perception: Attention normal. He is attentive. Mood and Affect: Mood normal. Mood is not anxious or depressed. Affect is not labile, blunt, angry or inappropriate. Speech: Speech normal. He is communicative. Speech is not rapid and pressured, delayed, slurred or tangential.         Behavior: Behavior normal. Behavior is not agitated, slowed, aggressive, withdrawn, hyperactive or combative. Behavior is cooperative. Thought Content: Thought content normal. Thought content is not paranoid or delusional. Thought content does not include homicidal or suicidal ideation. Thought content does not include homicidal or suicidal plan. Cognition and Memory: Cognition normal. Memory is not impaired. He does not exhibit impaired recent memory or impaired remote memory. Judgment: Judgment normal. Judgment is not impulsive or inappropriate. JOSE CARLOS  Patricks test  positive  Yeoman's  positive  Gaenslen's  positive  Kemps  positive  Phalens Reverse Phalens  na  Tinels  na  Spurlings  na       Assessment:     1. Lumbar spondylosis    2. Lumbar facet arthropathy    3. Primary osteoarthritis of both knees    4. Pathological compression fracture of lumbar vertebra, initial encounter [facility])            Plan:      · OARRS reviewed. Current MED: 3  · Patient was not offered naloxone for home. · Discussed long term side effects of medications, tolerance, dependency and addiction.   · Previous UDS reviewed  · UDS preformed today for compliance. · Patient told can not receive any pain medications from any other source. · No evidence of abuse, diversion or aberrant behavior.  Medications and/or procedures to improve function and quality of life- patient understanding with this and that may not be pain free   Discussed with patient about safe storage of medications at home   Discussed possible weaning of medication dosing dependent on treatment/procedure results.  Testing: lumbar knee imaging reviewed   Procedures: Left knee steroid injection with Dr Delroy Apley in office   Lumbar facet MBB #2 @ L1-1,1-1 bilateral with Dr Cole Gage Discussed Synvisc injections also if need   Discussed with patient about risks with procedure including infection, reaction to medication, increased pain, or bleeding.  Medications:Lyrica per PCP   If patient is on blood thinners will need approval to hold yes or no:Asp per PCP        Meds. Prescribed:   No orders of the defined types were placed in this encounter. Return for left knee steroid injection, Lumbar Facet MBB #2 L2-3,3-4 bilateral #2 with Dr Delroy Apley F/U Delroy Apley.                Electronically signed by AVRIL Garcia Aas, CNP on1/5/2022 at 9:56 AM

## 2022-01-06 NOTE — PROGRESS NOTES
Please set patient up for confirmatory medial branch blocks at bilateral L2/L3 and L3/L4   Patient does not need to hold any medications prior to the procedure. Patient does not need to be NPO prior to the procedure. We will perform a LOCAL injection.       Enid Heard, DO  Interventional Pain Management/PM&R   [place]

## 2022-01-19 ENCOUNTER — PREP FOR PROCEDURE (OUTPATIENT)
Dept: PHYSICAL MEDICINE AND REHAB | Age: 57
End: 2022-01-19

## 2022-01-24 ASSESSMENT — ENCOUNTER SYMPTOMS
SHORTNESS OF BREATH: 0
DIARRHEA: 0
CONSTIPATION: 0
ABDOMINAL PAIN: 0
PHOTOPHOBIA: 0
WHEEZING: 0
COUGH: 0
RHINORRHEA: 0
EYE PAIN: 0
SINUS PRESSURE: 0
BACK PAIN: 1
NAUSEA: 0
COLOR CHANGE: 0
CHEST TIGHTNESS: 0
SORE THROAT: 0
VOMITING: 0

## 2022-01-24 NOTE — H&P
Today, patient presents for planned confirmatory lumbar medial branch blocks at bilateral L2/L3 and L3/L4. This note is reflective of the patient's previous visit for evaluation. We will proceed with today's planned procedure. Since patient's last visit for evaluation, there have been no interval changes in medical history. Patient has no new numbness, weakness, or focal neurological deficit since evaluation. Patient has no contraindications to injection (no anticoagulation or recent antibiotic intake for active infections), and has a  present or is able to drive themselves (as discussed and cleared by physician). Allergies to latex, contrast dye, and steroid medications have been confirmed with the patient prior to the procedure. NPO necessity has been assessed and accepted based on procedure complexity. The risks and benefits of the procedure have been explained including but are not limited to infection, bleeding, paralysis, immediate post procedure weakness, and dizziness; the patient acknowledges understanding and desires to proceed with the procedure. Patient has signed consent for same procedure as discussed in previous clinic encounter. All other questions and concerns were addressed at bedside. See procedure note for full details. Post procedure Instructions: The patient was advised not to drive during the day of the procedure and not to engage in any significant decision making (unless otherwise states by physician). The patient was also advised to be cautious with walking/activity for 24 hours following today's visit and asked not to engage in over-exertion (unless otherwise states by physician). After this time, it is ok to resume pre-procedure level of activity. Patient advised to apply ice to site of injection in situations of pain and discomfort. Patient advised to not submerge site of injection during bath or pool activities for approximately 24 hours post-procedure.  Patient attested to understanding post procedure directions / restrictions. All other questions and concerns addressed before patient discharge in ambulatory fashion. HPI:   Didi Matthew is a 55 y.o. male is here today for    Chief Complaint: Low back pain and Knee pain     HPI   F/U Right knee steroid injection with Dr Robson Thurston 11/12/2021 receiving about 40% pain relief or benefit. He is able to stand and walk longer. F/U Lumbar facet MBB@ L2-3,3-4 Bilateral 12/14/2021 with Dr Robson Thurston states he received about 80% pain relief or benefit for 2-3 days. Has had LESI  L4-5  Right  9/16/2021  Dr Samantha Armando had Dr Brian Manzo complete,  states absolutely no relief or benefit. Dr Sabina Montague was given info on pt for Kyphoplasty for which is now a chronic  compression fracture, pt has not had procedure as of yet due to multiple reschedules,cancellations, abnormal labs, MRI scheduling issues. Will now offer Facet injection series. His bilateral knee pain is constant increases with stairs standing bending walking. He did not come in for pill count will no longer prescribe Narcotics. Pain increases with bending, lifting, walking, standing, stairs, getting up and down and housework or working at job. Treatments Tried ice, heat, narcotics, muscle relaxer, OTC rubs creams patches, steroid burst and injections  Pain Description stabbing, burning and aching    Medications reviewed. Patient denies side effects with medications. Patient states he is taking medications as prescribed. Hedenies receiving pain medications from other sources. He denies any ER visits since last visit. Pain scale with out pain medications or at its worst is 8/10.standing  Pain scale with pain medications or at its best is 2/10. Knee XRs  FINDINGS: There is no fracture or dislocation. Joint space narrowing and osteophyte formation are present at the medial and lateral compartments of the tibiofemoral joint and at the patellofemoral joint.  Findings are most severe in the medial compartment    of the tibiofemoral joint where there is bone-on-bone appearance. A flabella is noted.           Impression   1. No fracture or dislocation. 2. Tricompartmental osteoarthritis. FINDINGS: There is no fracture or dislocation. Joint space narrowing and osteophyte formation are present at the medial and lateral compartments of the tibial femoral joint and at the patellofemoral joint. Findings are most severe at the medial    compartment of the tibiofemoral joint where there is near bone-on-bone appearance. Soft tissues are unremarkable.           Impression   1. No fracture or dislocation. 2. Tricompartmental osteoarthritis. Lumbar         The patienthas No Known Allergies. Subjective:      Review of Systems   Constitutional: Positive for activity change. Negative for appetite change, chills, diaphoresis, fatigue, fever and unexpected weight change. HENT: Negative for congestion, ear pain, hearing loss, mouth sores, nosebleeds, rhinorrhea, sinus pressure and sore throat. Eyes: Negative for photophobia, pain and visual disturbance. Respiratory: Negative for cough, chest tightness, shortness of breath and wheezing. Cardiovascular: Negative for chest pain and palpitations. Gastrointestinal: Negative for abdominal pain, constipation, diarrhea, nausea and vomiting. Endocrine: Negative for cold intolerance, heat intolerance, polydipsia, polyphagia and polyuria. IDDM   Genitourinary: Negative for decreased urine volume, difficulty urinating, frequency and hematuria. Musculoskeletal: Positive for arthralgias, back pain, gait problem, myalgias, neck pain and neck stiffness. Negative for joint swelling. Skin: Negative for color change and rash. Allergic/Immunologic: Negative for food allergies and immunocompromised state. Neurological: Positive for seizures and numbness.  Negative for dizziness, tremors, syncope, facial asymmetry, speech difficulty, weakness, light-headedness and headaches. Hematological: Does not bruise/bleed easily. Psychiatric/Behavioral: Positive for sleep disturbance. Negative for agitation, behavioral problems, confusion, decreased concentration, dysphoric mood, hallucinations, self-injury and suicidal ideas. The patient is nervous/anxious. The patient is not hyperactive. Objective: There were no vitals filed for this visit. Physical Exam  Vitals and nursing note reviewed. Constitutional:       General: He is not in acute distress. Appearance: He is well-developed. He is not diaphoretic. HENT:      Head: Normocephalic and atraumatic. Right Ear: External ear normal.      Left Ear: External ear normal.      Nose: Nose normal.      Mouth/Throat:      Pharynx: No oropharyngeal exudate. Eyes:      General: No scleral icterus. Right eye: No discharge. Left eye: No discharge. Conjunctiva/sclera: Conjunctivae normal.      Pupils: Pupils are equal, round, and reactive to light. Neck:      Thyroid: No thyromegaly. Cardiovascular:      Rate and Rhythm: Normal rate and regular rhythm. Heart sounds: Normal heart sounds. No murmur heard. No friction rub. No gallop. Pulmonary:      Effort: Pulmonary effort is normal. No respiratory distress. Breath sounds: Normal breath sounds. No wheezing or rales. Chest:      Chest wall: No tenderness. Abdominal:      General: Bowel sounds are normal. There is no distension. Palpations: Abdomen is soft. Tenderness: There is no abdominal tenderness. There is no guarding or rebound. Musculoskeletal:      Cervical back: Full passive range of motion without pain, normal range of motion and neck supple. No edema, erythema, rigidity or tenderness. No muscular tenderness. Normal range of motion. Thoracic back: Tenderness present. Lumbar back: Tenderness present. Decreased range of motion.         Back:       Right hip: No tenderness. Left hip: No tenderness. Right knee: Bony tenderness and crepitus present. Decreased range of motion. Tenderness present over the medial joint line. Left knee: Bony tenderness and crepitus present. Decreased range of motion. Tenderness present over the medial joint line. Right ankle: No swelling. Legs:    Skin:     General: Skin is warm. Coloration: Skin is not pale. Findings: No erythema or rash. Neurological:      Mental Status: He is alert and oriented to person, place, and time. He is not disoriented. Cranial Nerves: No cranial nerve deficit. Sensory: Sensory deficit present. Motor: No weakness, atrophy or abnormal muscle tone. Coordination: Coordination normal.      Gait: Gait abnormal.      Deep Tendon Reflexes: Reflexes are normal and symmetric. Babinski sign absent on the right side. Reflex Scores:       Tricep reflexes are 2+ on the right side and 2+ on the left side. Bicep reflexes are 2+ on the right side and 2+ on the left side. Brachioradialis reflexes are 2+ on the right side and 2+ on the left side. Patellar reflexes are 2+ on the right side. Achilles reflexes are 2+ on the right side. Comments: SENSORY- decreased right foot. SLR neg   Psychiatric:         Attention and Perception: Attention normal. He is attentive. Mood and Affect: Mood normal. Mood is not anxious or depressed. Affect is not labile, blunt, angry or inappropriate. Speech: Speech normal. He is communicative. Speech is not rapid and pressured, delayed, slurred or tangential.         Behavior: Behavior normal. Behavior is not agitated, slowed, aggressive, withdrawn, hyperactive or combative. Behavior is cooperative. Thought Content: Thought content normal. Thought content is not paranoid or delusional. Thought content does not include homicidal or suicidal ideation.  Thought content does not include homicidal or suicidal plan. Cognition and Memory: Cognition normal. Memory is not impaired. He does not exhibit impaired recent memory or impaired remote memory. Judgment: Judgment normal. Judgment is not impulsive or inappropriate. JOSE CARLOS  Patricks test  positive  Yeoman's  positive  Gaenslen's  positive  Kemps  positive  Phalens Reverse Phalens  na  Tinels  na  Spurlings  na       Assessment:     No diagnosis found. Plan:      · OARRS reviewed. Current MED: 3  · Patient was not offered naloxone for home. · Discussed long term side effects of medications, tolerance, dependency and addiction. · Previous UDS reviewed  · UDS preformed today for compliance. · Patient told can not receive any pain medications from any other source. · No evidence of abuse, diversion or aberrant behavior.  Medications and/or procedures to improve function and quality of life- patient understanding with this and that may not be pain free   Discussed with patient about safe storage of medications at home   Discussed possible weaning of medication dosing dependent on treatment/procedure results.  Testing: lumbar knee imaging reviewed   Procedures: Left knee steroid injection with Dr Nicol Terry in office   Lumbar facet MBB #2 @ L1-1,1-1 bilateral with Dr Ros Lockwodo Discussed Synvisc injections also if need   Discussed with patient about risks with procedure including infection, reaction to medication, increased pain, or bleeding.  Medications:Lyrica per PCP   If patient is on blood thinners will need approval to hold yes or no:Asp per PCP        Meds. Prescribed:   No orders of the defined types were placed in this encounter. No follow-ups on file.                Electronically signed by Mathew Choe DO on1/24/2022 at 5:34 PM

## 2022-01-25 ENCOUNTER — HOSPITAL ENCOUNTER (OUTPATIENT)
Age: 57
Setting detail: OUTPATIENT SURGERY
Discharge: HOME | End: 2022-01-25
Attending: ANESTHESIOLOGY | Admitting: ANESTHESIOLOGY
Payer: MEDICAID

## 2022-01-25 ENCOUNTER — APPOINTMENT (OUTPATIENT)
Dept: GENERAL RADIOLOGY | Age: 57
End: 2022-01-25
Attending: ANESTHESIOLOGY
Payer: MEDICAID

## 2022-01-25 VITALS
RESPIRATION RATE: 16 BRPM | OXYGEN SATURATION: 95 % | SYSTOLIC BLOOD PRESSURE: 128 MMHG | DIASTOLIC BLOOD PRESSURE: 69 MMHG | HEIGHT: 74 IN | BODY MASS INDEX: 39.83 KG/M2 | TEMPERATURE: 97.2 F | HEART RATE: 76 BPM | WEIGHT: 310.4 LBS

## 2022-01-25 LAB — GLUCOSE BLD-MCNC: 73 MG/DL (ref 70–108)

## 2022-01-25 PROCEDURE — 82948 REAGENT STRIP/BLOOD GLUCOSE: CPT

## 2022-01-25 PROCEDURE — 3600000056 HC PAIN LEVEL 4 BASE: Performed by: ANESTHESIOLOGY

## 2022-01-25 PROCEDURE — 64494 INJ PARAVERT F JNT L/S 2 LEV: CPT | Performed by: ANESTHESIOLOGY

## 2022-01-25 PROCEDURE — 2500000003 HC RX 250 WO HCPCS: Performed by: ANESTHESIOLOGY

## 2022-01-25 PROCEDURE — 7100000010 HC PHASE II RECOVERY - FIRST 15 MIN: Performed by: ANESTHESIOLOGY

## 2022-01-25 PROCEDURE — 2709999900 HC NON-CHARGEABLE SUPPLY: Performed by: ANESTHESIOLOGY

## 2022-01-25 PROCEDURE — 3209999900 FLUORO FOR SURGICAL PROCEDURES

## 2022-01-25 PROCEDURE — 64493 INJ PARAVERT F JNT L/S 1 LEV: CPT | Performed by: ANESTHESIOLOGY

## 2022-01-25 RX ORDER — BUPIVACAINE HYDROCHLORIDE 5 MG/ML
INJECTION, SOLUTION PERINEURAL PRN
Status: DISCONTINUED | OUTPATIENT
Start: 2022-01-25 | End: 2022-01-25 | Stop reason: ALTCHOICE

## 2022-01-25 RX ORDER — LIDOCAINE HYDROCHLORIDE 10 MG/ML
INJECTION, SOLUTION EPIDURAL; INFILTRATION; INTRACAUDAL; PERINEURAL PRN
Status: DISCONTINUED | OUTPATIENT
Start: 2022-01-25 | End: 2022-01-25 | Stop reason: ALTCHOICE

## 2022-01-25 ASSESSMENT — PAIN - FUNCTIONAL ASSESSMENT
PAIN_FUNCTIONAL_ASSESSMENT: 0-10
PAIN_FUNCTIONAL_ASSESSMENT: PREVENTS OR INTERFERES SOME ACTIVE ACTIVITIES AND ADLS

## 2022-01-25 ASSESSMENT — PAIN DESCRIPTION - DESCRIPTORS: DESCRIPTORS: ACHING;CONSTANT

## 2022-01-25 NOTE — PROCEDURES
Pre-operative Diagnosis: Lumbar facet pain     Post-operative Diagnosis: Lumbar facet pain     Procedure: Bilateral lumbar medial branch blocks targeting facet joints of L2/L3 and L3/L4     Procedure Description:  After consent was obtained, the patient was placed in the prone position having pressure points appropriately padded. The lower back was prepped with chloraprep and draped in a sterile fashion. 0.5 cc of 1 % lidocaine was used for local anesthesia of the skin and superficial subcutaneous tissues. Three 22-gauge 3.5-inch needles were advanced under fluoroscopy in an AP view with caudad angulation: at the junction of the right superior articular process and the transverse process of the L2, L3, and L4 vertebrae. There was no paresthesias, heme, or CSF obtained. Needle placement was confirmed using AP and oblique views. Then 0.5 cc of 0.5% bupivacaine was injected at the site without complications. The procedure was repeated on the left side.  The patient tolerated the procedure well, transported to the recovery room, and observed for 15 minutes prior to being discharged in ambulatory fashion.     Procedural Complications: None  Estimated Blood Loss: 0 mL        Marty Worrell DO  Interventional Pain Management/PM&R   [facility] [employer]

## 2022-01-25 NOTE — PROGRESS NOTES
0893 RECEIVED IN PHASE 2 . AWAKE ALERT AND ORIENTED UNDER LOCAL ANESTHESIA. DENIES PAIN. INJECTION SITES CLEAN AND DRY. . WANTS NOTHING TO EAT  OR DRINK AT THIS TIME;  0840 UP AT SIDE OF CART TO GE DRESSED  [address]. VIA PRIVATE CAR [address] [facility].

## 2022-01-31 ENCOUNTER — OFFICE VISIT (OUTPATIENT)
Dept: PHYSICAL MEDICINE AND REHAB | Age: 57
End: 2022-01-31
Payer: MEDICAID

## 2022-01-31 VITALS
DIASTOLIC BLOOD PRESSURE: 80 MMHG | WEIGHT: 310 LBS | SYSTOLIC BLOOD PRESSURE: 138 MMHG | HEART RATE: 84 BPM | HEIGHT: 74 IN | BODY MASS INDEX: 39.78 KG/M2 | OXYGEN SATURATION: 98 %

## 2022-01-31 DIAGNOSIS — M47.816 LUMBAR FACET ARTHROPATHY: ICD-10-CM

## 2022-01-31 DIAGNOSIS — M47.816 LUMBAR SPONDYLOSIS: Primary | ICD-10-CM

## 2022-01-31 DIAGNOSIS — M48.56XA PATHOLOGICAL COMPRESSION FRACTURE OF LUMBAR VERTEBRA, INITIAL ENCOUNTER (HCC): ICD-10-CM

## 2022-01-31 DIAGNOSIS — M25.562 CHRONIC PAIN OF LEFT KNEE: ICD-10-CM

## 2022-01-31 DIAGNOSIS — G89.29 CHRONIC PAIN OF LEFT KNEE: ICD-10-CM

## 2022-01-31 DIAGNOSIS — M17.0 PRIMARY OSTEOARTHRITIS OF BOTH KNEES: ICD-10-CM

## 2022-01-31 PROCEDURE — G8484 FLU IMMUNIZE NO ADMIN: HCPCS | Performed by: ANESTHESIOLOGY

## 2022-01-31 PROCEDURE — 99214 OFFICE O/P EST MOD 30 MIN: CPT | Performed by: ANESTHESIOLOGY

## 2022-01-31 PROCEDURE — G8417 CALC BMI ABV UP PARAM F/U: HCPCS | Performed by: ANESTHESIOLOGY

## 2022-01-31 PROCEDURE — G8427 DOCREV CUR MEDS BY ELIG CLIN: HCPCS | Performed by: ANESTHESIOLOGY

## 2022-01-31 PROCEDURE — 20610 DRAIN/INJ JOINT/BURSA W/O US: CPT | Performed by: ANESTHESIOLOGY

## 2022-01-31 PROCEDURE — 1036F TOBACCO NON-USER: CPT | Performed by: ANESTHESIOLOGY

## 2022-01-31 PROCEDURE — 3017F COLORECTAL CA SCREEN DOC REV: CPT | Performed by: ANESTHESIOLOGY

## 2022-01-31 NOTE — PROGRESS NOTES
Understanding Colon and Rectal Polyps    The colon (also called the large intestine) is a muscular tube that forms the last part of the digestive tract. It absorbs water and stores food waste. The colon is about 4 to 6 feet long. The rectum is the last 6 inches of the colon. The colon and rectum have a smooth lining composed of millions of cells. Changes in these cells can lead to growths in the colon that can become cancerous and should be removed. Multiple tests are available to screen for colon cancer, but the colonoscopy is the most recommended test. During colonoscopy, these polyps can be removed. How often you need this test depends on many things including your condition, your family history, symptoms, and what the findings were at the previous colonoscopy.    When the colon lining changes  Changes that happen in the cells that line the colon or rectum can lead to growths called polyps. Over a period of years, polyps can turn cancerous. Removing polyps early may prevent cancer from ever forming.   Polyps  Polyps are fleshy clumps of tissue that form on the lining of the colon or rectum. Small polyps are usually benign (not cancerous). However, over time, cells in a polyp can change and become cancerous. Certain types of polyps known as adenomatous polyps and serrated polyps are pre-cancerous. The risk for cancer increases with the size of the polyp and certain cell and gene features. This means that they can become cancerous if they're not removed. Hyperplastic polyps are benign. They can grow quite large and not turn cancerous.    Cancer  Almost all colorectal cancers start when polyp cells starts growing abnormally. As a cancerous tumor grows, it may involve more and more of the colon or rectum. In time, cancer can also grow beyond the colon or rectum and spread to nearby organs or to glands called lymph nodes. The cells can also travel to other parts of the body. This is known as metastasis. The earlier  Patient being discharged in stable condition. Written and verbal instructions given to patient he voices no question or concerns. a cancerous tumor is removed, the better the chance of preventing its spread.     [employer] last reviewed this educational content on 6/1/2019 © 2000-2020 [employer]. [address] [place] [address], [place] [address]. All rights reserved. This information is not intended as a substitute for professional medical care. Always follow your healthcare professional's instructions.        Hemorrhoids    Hemorrhoids are swollen and inflamed veins inside the rectum and near the anus. The rectum is the last several inches of the colon. The anus is the passage between the rectum and the outside of the body.   Causes  The veins can become swollen due to increased pressure in them. This is most often caused by:   · Chronic constipation or diarrhea  · Straining when having a bowel movement  · Sitting too long on the toilet  · A low-fiber diet  · Pregnancy  Symptoms  · Bleeding from the rectum. You may notice this after bowel movements.  · Lump near the anus  · Itching around the anus  · Pain around the anus  · Mucus leaks from the anus  There are different types of hemorrhoids. Depending on the type you have and the severity, you may be able to treat yourself at home. In some cases, a procedure may be the best treatment option. Your healthcare provider can tell you more about this, if needed.   Home care  General care  · To get relief from pain or itching, try:  ? Medicines. Your healthcare provider may recommend stool softeners, suppositories, or laxatives to help manage constipation. Use these exactly as directed.  ? Sitz baths. A sitz bath involves sitting in a few inches of warm bath water. Be careful not to make the water so hot that you burn yourself--test it before sitting in it. Soak for about 10 to 15 minutes a few times a day. This may help relieve pain.  ? Topical products. Your healthcare provider may prescribe or recommend creams, ointments, or pads that can be applied to the hemorrhoid. Use these exactly as  directed.  Tips to help prevent hemorrhoids   · Eat more fiber. Fiber adds bulk to stool and absorbs water as it moves through your colon. This makes stool softer and easier to pass.  ? Increase the fiber in your diet with more fiber-rich foods. These include fresh fruit, vegetables, and whole grains.  ? Take a fiber supplement or bulking agent, if advised by your healthcare provider. These include products such as psyllium or methylcellulose.  · Drink more water. Your healthcare provider may direct you to drink plenty of water. This can help keep stool soft.  · Be more active. Frequent exercise aids digestion and helps prevent constipation. It may also help make bowel movements more regular.  · Don’t strain during bowel movements. This can make hemorrhoids more likely. Also, don’t sit on the toilet for long periods of time.    Follow-up care  Follow up with your healthcare provider as advised. If a culture or imaging tests were done, someone will let you know the results when they are ready. This may take a few days or longer. If your healthcare provider recommends a procedure for your hemorrhoids, these options can be discussed. Options may include surgery and outpatient office treatments.   When to seek medical advice  Call your healthcare provider right away if any of these occur:   · Increased bleeding from the rectum  · Increased pain around the rectum or anus  · Weakness or dizziness  Call 911  Call 911 if any of these occur:   · Trouble breathing or swallowing  · Fainting or loss of consciousness  · Unusually fast heart rate  · Vomiting blood  · Large amounts of blood in stool or black, tarry stools  [employer] last reviewed this educational content on 8/1/2019 © 2000-2021 [employer]. All rights reserved. This information is not intended as a substitute for professional medical care. Always follow your healthcare professional's instructions.        Diverticulosis    Diverticulosis means that  small pouches have formed in the wall of your large intestine (colon). Most often, this problem causes no symptoms and is common as people age. But the pouches in the colon are at risk of becoming infected. When this happens, the condition is called diverticulitis. Although most people with diverticulosis never develop diverticulitis, it is still not uncommon. Rectal bleeding can also occur and in less common situations, a type of colon inflammation called colitis.  While most people don't have symptoms, some people with diverticulosis may have:  · Abdominal cramps and pain  · Bloating  · Constipation  · Change in bowel habits  Causes  The exact cause of diverticulosis (and diverticulitis) has not been proved, but a few things are associated with the condition:  · Low-fiber diet  · Constipation  · Lack of exercise  Your healthcare provider will talk with you about how to manage your condition. Diet changes may be all that are needed to help control diverticulosis and prevent progression to diverticulitis. If you develop diverticulitis, you will likely need other treatments.  Home care  You may be told to take fiber supplements daily. Fiber adds bulk to the stool so that it passes through the colon more easily. Stool softeners may also be recommended. You may also be given medicines for pain relief. Be sure to take all medicines as directed.  In the past, people were told to avoid corn, nuts, and seeds. This is no longer necessary.  Follow these guidelines when caring for yourself at home:  · Eat unprocessed foods that are high in fiber. Whole-grains, fruits, and vegetables are good choices.  · Drink 6 to 8 glasses of water every day unless your healthcare provider has you limit how much fluid you should have.  · Watch for changes in your bowel movements. Tell your provider if you notice any changes.  · Begin an exercise program. Ask your provider how to get started. Generally, walking is the best.  · Get plenty of  rest and sleep.  Follow-up care  Follow up with your healthcare provider, or as advised. Regular visits may be needed to check on your health. Sometimes special procedures such as colonoscopy, are needed after an episode of diverticulitis or blooding. Be sure to keep all your appointments.  If a stool sample was taken, or cultures were done, you should be told if they are positive, or if your treatment needs to be changed. You can call as directed for the results.  If X-rays were done, a radiologist will look at them. You will be told if there is a change in your treatment.  If antibiotics were prescribed, be sure to finish them all.  When to seek medical advice  Call your healthcare provider right away if any of these occur:  · Fever of 100.4°F (38°C) or higher, or as directed by your healthcare provider  · Severe cramps in the lower left side of the abdomen or pain that is getting worse  · Tenderness in the lower left side of the abdomen or worsening pain throughout the abdomen  · Diarrhea or constipation that doesn't get better within 24 hours  · Nausea and vomiting  · Bleeding from the rectum  Call 911  Call 911 if any of the following occur:  · Trouble breathing  · Confusion  · Very drowsy or trouble awakening  · Fainting or loss of consciousness  · Rapid heart rate  · Chest pain  [employer] last reviewed this educational content on 2018-2021 [employer]. All rights reserved. This information is not intended as a substitute for professional medical care. Always follow your healthcare professional's instructions.          Endoscopy Discharge Instructions    Name: Feliz Sanchez  : 1946  MRN: [id number]  Procedure: Colonoscopy  Diagnosis:  Colon cancer screening  (primary encounter diagnosis)  Comment: 1 polyp, diverticulosis, internal hemorrhoids  Plan: repeat colonoscopy in 5 years based on path    Sedation Given: MAC    Diet    Begin regular diet today.  Do not drink alcohol,  including beer and wine, for 1 days.    Medications     Ok to resume home medications      Post Procedure    You may have a temporary sore throat if you had a gastroscopy. Lazenges or a warm salt gargle will help.     Your abdomen may feel bloated. This gaseous discomfort is normal and expected. Passing flatus (gas) is encouraged.    If you experience any of the symptoms listed below following your procedure, call the [facility]- Gastroenterology Department or your physician. Proceed to a walk-in care facility or emergency department if you cannot reach the [facility]GI department within 15-30 minutes.    Significant bleeding, chills or fever, vomiting, persistent nausea, significant pain, pain or redness at your intravenous site, black tarry bowel movements, other new problems    Please note that some procedures, such as biopsies or removal of polyps, may cause minimal bleeding.    Special Instructions:    You should plan to go home and rest today.    Do not operate complex machinery including a car until tomorrow.    Do not make important personal or business decisions until tomorrow.    You can resume normal physical activity tomorrow.    Repeat colonoscopy in 5 years for colon cancer screening/surveillance depending on biopsy results. Contact your physician at that time to schedule the next procedure.    Other instruction - high fiber diet.    Biopsy Results    You will receive a follow up call or letter from the [facility]. If you do not receive biopsy results within 3-4 weeks, please call us.    Results will also be forwarded to your primary care physicain.    [facility]  GI Department  [address] [facility]  [phone]  [phone]    Name of Escort who will be taking you home _____________________________    Physician Name Dr. Mayuri Ramos    Follow-up appointment ___________________    Patient Signature  __________________________________

## 2022-01-31 NOTE — PROGRESS NOTES
Chronic Pain/PM&R Clinic Note     Encounter Date: 1/31/22    Subjective:   Chief Complaint:   Chief Complaint   Patient presents with    Follow Up After Procedure    Injections     lt. knee injection       History of Present Illness:   Dave Marrufo is a 55 y.o. male seen in the clinic initially on 10/20/21 for his history of chronic knee pain. He has a medical history of anxiety/depression and T2DM is s/p multiple toe amputations. He has longstanding history of bilateral knee pain for the last several years. He endorses the majority of pain to be on the medial and lateral aspect of the knees at the joint line. He states that his knee pain is a constant 5/10 aching, stabbing pain. His knee pain is worse with any activities where he is trying to go up and down stairs and really has a difficult time being on his legs for an extended duration of time. He denies any further radiation down his legs. He does have some low back pain was found to have an L1 compression fracture several months ago and states that he did not undergo a kyphoplasty but is wondering how this can be addressed. He describes the majority of his back pain to be axial in nature slightly above his waistline. This pain is described as a constant aching, nagging, stabbing pain that he rates as a constant 6/10 pain. He denies any pain that radiates further down his legs, associated leg weakness, leg paresthesias, saddle anesthesia, bowel/bladder incontinence. Today, 1/31/2022, patient presents for planned follow-up for chronic low back and knee pain. He underwent his confirmatory lumbar medial branch blocks on 1/25/2022. He reports 100% pain relief for 2 days after the procedure. He states that he is able to stand upright and stand up straighter without any low back may  He states that he would like to proceed with his radiofrequency ablation of his lumbar spine as previously discussed.   He denies any new symptoms of worsening radicular leg pain, new focal leg weakness, associated leg paresthesias, saddle anesthesia, bowel/bladder incontinence. He also states that his left knee has been extra problematic and would like to have an injection left knee done today in clinic. Past Medical History:   Diagnosis Date    Anxiety     Depression     Diabetes ([address]     Hypertension     Seizures ([facility])        Past Surgical History:   Procedure Laterality Date    KNEE ARTHROSCOPY Bilateral     LEG SURGERY      PAIN MANAGEMENT PROCEDURE Bilateral 12/14/2021    medial branch blocks at bilateral L2/L3 and L3/L4 [facility] [place] [facility] Bilateral 1/25/2022    Lumbar Facet MBB #2 L2-3,3-4 bilateral #2 performed by Adrian Awan DO at [address] [place] [address] TOE AMPUTATION Bilateral     multiple toes amputation       Family History   Problem Relation Age of Onset    Hypertension Mother     Diabetes Mother        Social History     Socioeconomic History    Marital status: Single     Spouse name: Not on file    Number of children: Not on file    Years of education: Not on file    Highest education level: Not on file   Occupational History    Not on file   Tobacco Use    Smoking status: Never Smoker    Smokeless tobacco: Never Used   Vaping Use    Vaping Use: Never used   Substance and Sexual Activity    Alcohol use: Not Currently    Drug use: Not Currently    Sexual activity: Yes   Other Topics Concern    Not on file   Social History Narrative    Not on file     Social Determinants of Health     Financial Resource Strain:     Difficulty of Paying Living Expenses: Not on file   Food Insecurity:     Worried About Running Out of Food in the Last Year: Not on file    Stew of Food in the Last Year: Not on file   Transportation Needs:     Lack of Transportation (Medical): Not on file    Lack of Transportation (Non-Medical):  Not on file   Physical Activity:     Days of Exercise per Week: Not on file    Minutes of Exercise per Session: Not on file   Stress:     Feeling of Stress : Not on file   Social Connections:     Frequency of Communication with Friends and Family: Not on file    Frequency of Social Gatherings with Friends and Family: Not on file    Attends Spiritism Services: Not on file    Active Member of Clubs or Organizations: Not on file    Attends Club or Organization Meetings: Not on file    Marital Status: Not on file   Intimate Partner Violence:     Fear of Current or Ex-Partner: Not on file    Emotionally Abused: Not on file    Physically Abused: Not on file    Sexually Abused: Not on file   Housing Stability:     Unable to Pay for Housing in the Last Year: Not on file    [facility] in [facility]: Not on file    Unstable Housing in the Last Year: Not on file       Medications & Allergies:   Current Outpatient Medications   Medication Instructions    aspirin 81 mg, Oral, DAILY    FLUoxetine (PROZAC) 10 mg, Oral, DAILY    glimepiride (AMARYL) 6 mg, Oral, EVERY MORNING    insulin lispro (HUMALOG) 0-15 Units, SubCUTAneous, 3 TIMES DAILY BEFORE MEALS    lisinopril (PRINIVIL;ZESTRIL) 10 mg, Oral, DAILY    pregabalin (LYRICA) 75 mg, Oral, 3 TIMES DAILY       No Known Allergies    Review of Systems:   Constitutional: negative for weight changes or fevers  Genitourinary: negative for bowel/bladder incontinence   Musculoskeletal: positive for low back pain and bilateral knee pain  Neurological: negative for any leg weakness or numbness/tingling  Behavioral/Psych: negative for anxiety/depression   All other systems reviewed and are negative    Objective:     Vitals:    01/31/22 1559   BP: 138/80   Pulse: 84   SpO2: 98%     Constitutional: Pleasant, no acute distress   Head: Normocephalic, atraumatic   Eyes: Conjunctivae normal   Neck: Supple, symmetrical   Lungs: Normal respiratory effort, non-labored breathing   Cardiovascular: Limbs warm and well perfused   Abdomen: Non-protruded   Musculoskeletal: Muscle bulk symmetric, no atrophy, no gross deformities   · Lower Extremities: ROM WNL. · Thorax: No paraspinal tenderness bilaterally. No scoliosis or kyphosis. · Lumbar Spine: ROM reduced in extension. Lumbar paraspinals tender to palpation bilaterally. SLR neg bilaterally. Positive facet loading bilaterally. - B/L Knees: TTP over medial and lateral joint lines. No appreciable fluid in knees. Ligamentous structures intact. Neurological: Cranial nerves II-XII grossly intact. · Gait - Antalgic gait. Ambulates without assistive device. · Motor: No focal motor deficits appreciated in lower limbs   Skin: No rashes or lesions present   Psychological: Cooperative, no exaggerated pain behaviors       Assessment:    Diagnosis Orders   1. Lumbar spondylosis     2. Lumbar facet arthropathy     3. Primary osteoarthritis of both knees     4. Chronic pain of left knee     5. Pathological compression fracture of lumbar vertebra, initial encounter ([facility]           Estuardo Nayak is a 55 y. o.male presenting to the pain clinic for evaluation of chronic low back and knee pain. His clinical history and physical lamination are consistent with lumbar facet mediated pain secondary to an L1 compression fracture. In addition, he has bilateral knee pain secondary advanced osteoarthritis. He has responded significantly to his diagnostic and confirmatory medial branch blocks targeting his facet joints L2/L3 and L3/L4. I set him up for thermal radiofrequency ablation targeting these joints. We will start with the right side first proceed with the left side at a later date. Patient underwent a left intra-articular knee injection today in clinic. Plan: The following treatment recommendations and plan were discussed in detail with Estuardo Nayak.     Imaging:   I have reviewed patients imaging of MRI L-spine and results were discussed with patient today.     Interventions: In presence of lumbar axial back pain and with physical exam consistent for facetal pain secondary to L1 vertebral body compression fracture, thermal radiofrequency ablations at BILATERAL L2/L3 and L3/L4 facet joints was chosen. We will proceed with the right side first and performed a left-side at a later date. The risks and benefits were discussed in detail with the patient. Patient wants to proceed with the injection. With examination consistent with left knee pain secondary to advanced osteoarthritis, patient underwent a left intra-articular knee injection today in clinic. The risks of the injection were discussed in detail with the patient. (Please see attached procedure note)    Anticoagulation/NPO Recommendations:   Patient does not need to hold any medications prior to the procedure. Patient will need to be NPO x 8 hours prior to the procedure. We will start an IV prior to the procedure. Multidisciplinary Pain Management:   In the presence of complex, chronic, and multi-factorial pain, the importance of a multidisciplinary approach to pain management in the patients management regimen was emphasized and discussed in great detail.      Referrals:  None    Prescriptions Written This Visit:   None    Follow-up: For R-sided lumbar RFA      Marty Worrell DO  Interventional Pain Management/PM&R   [place]

## 2022-02-01 ENCOUNTER — TELEPHONE (OUTPATIENT)
Dept: PHYSICAL MEDICINE AND REHAB | Age: 57
End: 2022-02-01

## 2022-02-01 RX ORDER — TRIAMCINOLONE ACETONIDE 40 MG/ML
40 INJECTION, SUSPENSION INTRA-ARTICULAR; INTRAMUSCULAR ONCE
Status: COMPLETED | OUTPATIENT
Start: 2022-02-01 | End: 2022-02-01

## 2022-02-01 RX ADMIN — TRIAMCINOLONE ACETONIDE 40 MG: 40 INJECTION, SUSPENSION INTRA-ARTICULAR; INTRAMUSCULAR at 08:28

## 2022-02-10 ENCOUNTER — PREP FOR PROCEDURE (OUTPATIENT)
Dept: PHYSICAL MEDICINE AND REHAB | Age: 57
End: 2022-02-10

## 2022-02-15 NOTE — H&P
Today, patient presents for planned thermal radiofrequency ablations at RIGHT L2/L3 and L3/L4 facet joints. This note is reflective of the patient's previous visit for evaluation. We will proceed with today's planned procedure. Since patient's last visit for evaluation, there have been no interval changes in medical history. Patient has no new numbness, weakness, or focal neurological deficit since evaluation. Patient has no contraindications to injection (no anticoagulation or recent antibiotic intake for active infections), and has a  present or is able to drive themselves (as discussed and cleared by physician). Allergies to latex, contrast dye, and steroid medications have been confirmed with the patient prior to the procedure. NPO necessity has been assessed and accepted based on procedure complexity. The risks and benefits of the procedure have been explained including but are not limited to infection, bleeding, paralysis, immediate post procedure weakness, and dizziness; the patient acknowledges understanding and desires to proceed with the procedure. Patient has signed consent for same procedure as discussed in previous clinic encounter. All other questions and concerns were addressed at bedside. See procedure note for full details. Post procedure Instructions: The patient was advised not to drive during the day of the procedure and not to engage in any significant decision making (unless otherwise states by physician). The patient was also advised to be cautious with walking/activity for 24 hours following today's visit and asked not to engage in over-exertion (unless otherwise states by physician). After this time, it is ok to resume pre-procedure level of activity. Patient advised to apply ice to site of injection in situations of pain and discomfort. Patient advised to not submerge site of injection during bath or pool activities for approximately 24 hours post-procedure.  Patient attested to understanding post procedure directions / restrictions. All other questions and concerns addressed before patient discharge in ambulatory fashion. Chronic Pain/PM&R Clinic Note     Encounter Date: 1/31/22    Subjective:   Chief Complaint:   No chief complaint on file. History of Present Illness:   Anurag Palmer is a 55 y.o. male seen in the clinic initially on 10/20/21 for his history of chronic knee pain. He has a medical history of anxiety/depression and T2DM is s/p multiple toe amputations. He has longstanding history of bilateral knee pain for the last several years. He endorses the majority of pain to be on the medial and lateral aspect of the knees at the joint line. He states that his knee pain is a constant 5/10 aching, stabbing pain. His knee pain is worse with any activities where he is trying to go up and down stairs and really has a difficult time being on his legs for an extended duration of time. He denies any further radiation down his legs. He does have some low back pain was found to have an L1 compression fracture several months ago and states that he did not undergo a kyphoplasty but is wondering how this can be addressed. He describes the majority of his back pain to be axial in nature slightly above his waistline. This pain is described as a constant aching, nagging, stabbing pain that he rates as a constant 6/10 pain. He denies any pain that radiates further down his legs, associated leg weakness, leg paresthesias, saddle anesthesia, bowel/bladder incontinence. Today, 1/31/2022, patient presents for planned follow-up for chronic low back and knee pain. He underwent his confirmatory lumbar medial branch blocks on 1/25/2022. He reports 100% pain relief for 2 days after the procedure.   He states that he is able to stand upright and stand up straighter without any low back may  He states that he would like to proceed with his radiofrequency ablation of his lumbar spine as previously discussed. He denies any new symptoms of worsening radicular leg pain, new focal leg weakness, associated leg paresthesias, saddle anesthesia, bowel/bladder incontinence. He also states that his left knee has been extra problematic and would like to have an injection left knee done today in clinic. Past Medical History:   Diagnosis Date    Anxiety     Depression     Diabetes ([address]     Hypertension     Seizures ([facility])        Past Surgical History:   Procedure Laterality Date    KNEE ARTHROSCOPY Bilateral     LEG SURGERY      PAIN MANAGEMENT PROCEDURE Bilateral 12/14/2021    medial branch blocks at bilateral L2/L3 and L3/L4 performed by Elle Whitt DO at [facility] Bilateral 1/25/2022    Lumbar Facet MBB #2 L2-3,3-4 bilateral #2 performed by Elle Whitt DO at [address] [place] [address] TOE AMPUTATION Bilateral     multiple toes amputation       Family History   Problem Relation Age of Onset    Hypertension Mother     Diabetes Mother        Social History     Socioeconomic History    Marital status: Single     Spouse name: Not on file    Number of children: Not on file    Years of education: Not on file    Highest education level: Not on file   Occupational History    Not on file   Tobacco Use    Smoking status: Never Smoker    Smokeless tobacco: Never Used   Vaping Use    Vaping Use: Never used   Substance and Sexual Activity    Alcohol use: Not Currently    Drug use: Not Currently    Sexual activity: Yes   Other Topics Concern    Not on file   Social History Narrative    Not on file     Social Determinants of Health     Financial Resource Strain:     Difficulty of Paying Living Expenses: Not on file   Food Insecurity:     Worried About Running Out of Food in the Last Year: Not on file    Stew of Food in the Last Year: Not on file   Transportation Needs:     Lack of Transportation (Medical):  Not on file  Lack of Transportation (Non-Medical): Not on file   Physical Activity:     Days of Exercise per Week: Not on file    Minutes of Exercise per Session: Not on file   Stress:     Feeling of Stress : Not on file   Social Connections:     Frequency of Communication with Friends and Family: Not on file    Frequency of Social Gatherings with Friends and Family: Not on file    Attends Yazidi Services: Not on file    Active Member of [address] [place] [address] or Organizations: Not on file    Attends Club or Organization Meetings: Not on file    Marital Status: Not on file   Intimate Partner Violence:     Fear of Current or Ex-Partner: Not on file    Emotionally Abused: Not on file    Physically Abused: Not on file    Sexually Abused: Not on file   Housing Stability:     Unable to Pay for Housing in the Last Year: Not on file    Number of [place] in the Last Year: Not on file    Unstable Housing in the Last Year: Not on file       Medications & Allergies:   Current Outpatient Medications   Medication Instructions    aspirin 81 mg, Oral, DAILY    FLUoxetine (PROZAC) 10 mg, Oral, DAILY    glimepiride (AMARYL) 6 mg, Oral, EVERY MORNING    insulin lispro (HUMALOG) 0-15 Units, SubCUTAneous, 3 TIMES DAILY BEFORE MEALS    lisinopril (PRINIVIL;ZESTRIL) 10 mg, Oral, DAILY    pregabalin (LYRICA) 75 mg, Oral, 3 TIMES DAILY       No Known Allergies    Review of Systems:   Constitutional: negative for weight changes or fevers  Genitourinary: negative for bowel/bladder incontinence   Musculoskeletal: positive for low back pain and bilateral knee pain  Neurological: negative for any leg weakness or numbness/tingling  Behavioral/Psych: negative for anxiety/depression   All other systems reviewed and are negative    Objective: There were no vitals filed for this visit.   Constitutional: Pleasant, no acute distress   Head: Normocephalic, atraumatic   Eyes: Conjunctivae normal   Neck: Supple, symmetrical   Lungs: Normal respiratory effort, non-labored breathing   Cardiovascular: Limbs warm and well perfused   Abdomen: Non-protruded   Musculoskeletal: Muscle bulk symmetric, no atrophy, no gross deformities   · Lower Extremities: ROM WNL. · Thorax: No paraspinal tenderness bilaterally. No scoliosis or kyphosis. · Lumbar Spine: ROM reduced in extension. Lumbar paraspinals tender to palpation bilaterally. SLR neg bilaterally. Positive facet loading bilaterally. - B/L Knees: TTP over medial and lateral joint lines. No appreciable fluid in knees. Ligamentous structures intact. Neurological: Cranial nerves II-XII grossly intact. · Gait - Antalgic gait. Ambulates without assistive device. · Motor: No focal motor deficits appreciated in lower limbs   Skin: No rashes or lesions present   Psychological: Cooperative, no exaggerated pain behaviors       Assessment:    Diagnosis Orders   1. Lumbar spondylosis     2. Lumbar facet arthropathy     3. Primary osteoarthritis of both knees     4. Chronic pain of left knee     5. Pathological compression fracture of lumbar vertebra, [facility]           Dave Marrufo is a 55 y. o.male presenting to the pain clinic for evaluation of chronic low back and knee pain. His clinical history and physical lamination are consistent with lumbar facet mediated pain secondary to an L1 compression fracture. In addition, he has bilateral knee pain secondary advanced osteoarthritis. He has responded significantly to his diagnostic and confirmatory medial branch blocks targeting his facet joints L2/L3 and L3/L4. I set him up for thermal radiofrequency ablation targeting these joints. We will start with the right side first proceed with the left side at a later date. Patient underwent a left intra-articular knee injection today in clinic. Plan: The following treatment recommendations and plan were discussed in detail with Dave Marrufo.     Imaging:   I have reviewed patients imaging of MRI L-spine and results were discussed with patient today. Interventions: In presence of lumbar axial back pain and with physical exam consistent for facetal pain secondary to L1 vertebral body compression fracture, thermal radiofrequency ablations at BILATERAL L2/L3 and L3/L4 facet joints was chosen. We will proceed with the right side first and performed a left-side at a later date. The risks and benefits were discussed in detail with the patient. Patient wants to proceed with the injection. With examination consistent with left knee pain secondary to advanced osteoarthritis, patient underwent a left intra-articular knee injection today in clinic. The risks of the injection were discussed in detail with the patient. (Please see attached procedure note)    Anticoagulation/NPO Recommendations:   Patient does not need to hold any medications prior to the procedure. Patient will need to be NPO x 8 hours prior to the procedure. We will start an IV prior to the procedure. Multidisciplinary Pain Management:   In the presence of complex, chronic, and multi-factorial pain, the importance of a multidisciplinary approach to pain management in the patients management regimen was emphasized and discussed in great detail.      Referrals:  None    Prescriptions Written This Visit:   None    Follow-up: For R-sided lumbar RFA      Marty Worrell DO  Interventional Pain Management/PM&R   [place]

## 2022-02-16 ENCOUNTER — HOSPITAL ENCOUNTER (OUTPATIENT)
Age: 57
Setting detail: OUTPATIENT SURGERY
Discharge: HOME | End: 2022-02-16
Attending: ANESTHESIOLOGY | Admitting: ANESTHESIOLOGY
Payer: COMMERCIAL

## 2022-02-16 ENCOUNTER — APPOINTMENT (OUTPATIENT)
Dept: GENERAL RADIOLOGY | Age: 57
End: 2022-02-16
Attending: ANESTHESIOLOGY
Payer: COMMERCIAL

## 2022-02-16 VITALS
BODY MASS INDEX: 40.69 KG/M2 | HEART RATE: 82 BPM | RESPIRATION RATE: 16 BRPM | WEIGHT: 307 LBS | SYSTOLIC BLOOD PRESSURE: 114 MMHG | DIASTOLIC BLOOD PRESSURE: 65 MMHG | TEMPERATURE: 98.1 F | HEIGHT: 73 IN | OXYGEN SATURATION: 96 %

## 2022-02-16 LAB — GLUCOSE BLD-MCNC: 156 MG/DL (ref 70–108)

## 2022-02-16 PROCEDURE — 64636 DESTROY L/S FACET JNT ADDL: CPT | Performed by: ANESTHESIOLOGY

## 2022-02-16 PROCEDURE — 3209999900 FLUORO FOR SURGICAL PROCEDURES

## 2022-02-16 PROCEDURE — 64635 DESTROY LUMB/SAC FACET JNT: CPT | Performed by: ANESTHESIOLOGY

## 2022-02-16 PROCEDURE — 82948 REAGENT STRIP/BLOOD GLUCOSE: CPT

## 2022-02-16 PROCEDURE — 7100000011 HC PHASE II RECOVERY - ADDTL 15 MIN: Performed by: ANESTHESIOLOGY

## 2022-02-16 PROCEDURE — 3600000054 HC PAIN LEVEL 3 BASE: Performed by: ANESTHESIOLOGY

## 2022-02-16 PROCEDURE — 2709999900 HC NON-CHARGEABLE SUPPLY: Performed by: ANESTHESIOLOGY

## 2022-02-16 PROCEDURE — 99152 MOD SED SAME PHYS/QHP 5/>YRS: CPT | Performed by: ANESTHESIOLOGY

## 2022-02-16 PROCEDURE — 6360000002 HC RX W HCPCS: Performed by: ANESTHESIOLOGY

## 2022-02-16 PROCEDURE — 3600000055 HC PAIN LEVEL 3 ADDL 15 MIN: Performed by: ANESTHESIOLOGY

## 2022-02-16 PROCEDURE — 7100000010 HC PHASE II RECOVERY - FIRST 15 MIN: Performed by: ANESTHESIOLOGY

## 2022-02-16 PROCEDURE — 2500000003 HC RX 250 WO HCPCS: Performed by: ANESTHESIOLOGY

## 2022-02-16 RX ORDER — MIDAZOLAM HYDROCHLORIDE 1 MG/ML
INJECTION INTRAMUSCULAR; INTRAVENOUS PRN
Status: DISCONTINUED | OUTPATIENT
Start: 2022-02-16 | End: 2022-02-16 | Stop reason: ALTCHOICE

## 2022-02-16 RX ORDER — LIDOCAINE HYDROCHLORIDE 10 MG/ML
INJECTION, SOLUTION EPIDURAL; INFILTRATION; INTRACAUDAL; PERINEURAL PRN
Status: DISCONTINUED | OUTPATIENT
Start: 2022-02-16 | End: 2022-02-16 | Stop reason: ALTCHOICE

## 2022-02-16 RX ORDER — LIDOCAINE HYDROCHLORIDE 20 MG/ML
INJECTION, SOLUTION EPIDURAL; INFILTRATION; INTRACAUDAL; PERINEURAL PRN
Status: DISCONTINUED | OUTPATIENT
Start: 2022-02-16 | End: 2022-02-16 | Stop reason: ALTCHOICE

## 2022-02-16 RX ORDER — FENTANYL CITRATE 50 UG/ML
INJECTION, SOLUTION INTRAMUSCULAR; INTRAVENOUS PRN
Status: DISCONTINUED | OUTPATIENT
Start: 2022-02-16 | End: 2022-02-16 | Stop reason: ALTCHOICE

## 2022-02-16 ASSESSMENT — PAIN DESCRIPTION - DESCRIPTORS: DESCRIPTORS: CONSTANT

## 2022-02-16 ASSESSMENT — PAIN - FUNCTIONAL ASSESSMENT: PAIN_FUNCTIONAL_ASSESSMENT: 0-10

## 2022-02-16 ASSESSMENT — PAIN SCALES - GENERAL: PAINLEVEL_OUTOF10: 3

## 2022-02-16 NOTE — PROGRESS NOTES
1311:  Patient arrives to recovery room via cart. Spontaneous respirations, vss, report received from surgical RN. Patient denies pain, numbness, tingling , nausea. Injection site clean, dry, intact. HOB elevated. IV capped. Snack and drink given. Call light within reach. 1320: patient tolerating drink and snack, Iv removed. 1340: patient ambulated to discharge lobby in stable condition. patient discharged home with son.

## 2022-02-16 NOTE — PROCEDURES
Pre-operative Diagnosis: Lumbar facet pain     Post-operative Diagnosis: Lumbar facet pain     Procedure: Right lumbar thermal radiofrequency ablation targeting facet joints L2/L3 and L3/L4    Procedure Description:  After consent was obtained, the patient was placed in the prone position having pressure points appropriately padded. The lower back was prepped with chloraprep and draped in a sterile fashion. 0.5 cc of 1 % lidocaine was used for local anesthesia of the skin and superficial subcutaneous tissues. Three 20-gauge 100mm SMK cannula with a 10-mm active tip were advanced under fluoroscopy in an AP view with caudad angulation on to junction of the right superior articular process and the transverse process of the L2, L4, and L4 vertebra and at the sacral ala. There were no paresthesias, heme or CSF obtained. Needle placement was confirmed using AP and oblique views. Sensory and motor stimulation at 50Hz and 2Hz and impedance measurements were carried out having reached threshold at:     RIGHT  L2: 0.2V/3V/200 Ohms  L3: 0.2V/3V/190 Ohms  L4: 0.2V/3V/180 Ohms     Then, 1cc of 2 % Lidocaine was injected at the site. Temperature was then raised to 80 degrees centigrade for 90 seconds with a 15 second temperature ramp. No pain was reported during the lesioning. The needles were then withdrawn without complications. The patient tolerated the procedure well and was transported to the recovery room where he was observed for 15 minutes to then be discharged in ambulatory fashion.     Procedural Complications: None  Estimated Blood Loss: 0 mL    IV sedation was used during the procedure:  - Moderate intravenous conscious sedation was supervised by Dr. Norberto Sales  - The patient was independently monitored by a Registered Nurse assigned to the procedure room  - Monitoring included automated blood pressure, continuous EKG, and continuous pulse oximetry  - The detailed conscious record is permanently stored in the [address] [facility] following is the conscious sedation record:  Start Time: 12:53  End Time : 13:08  Duration: 15 minutes   Medications Administered: 1 mg Versed, 50 mcg Fentanyl        Marty Worrell DO  Interventional Pain Management/PM&R   [place]

## 2022-02-16 NOTE — POST SEDATION
[address] [address]  Sedation/Analgesia Post Sedation Record    Pt Name: Roxy Haque  MRN: [id number]  YOB: 1965  Procedure Performed By: Meghann Hall DO  Primary Care Physician: Nicho Key MD    POST-PROCEDURE    Physicians/Assistants: Meghann Hall DO  Procedure Performed: See Procedure Note   Sedation/Anesthesia: Versed and Fentanyl (See procedure note for amount and duration)  Estimated Blood Loss:     0  ml  Specimens Removed: None        Complications: None           Marty Dang DO  Electronically signed 2/16/2022 at 4:49 PM

## 2022-02-16 NOTE — PRE SEDATION
[address] [address]  Pre-Sedation/Analgesia History & Physical    Pt Name: Kayla Tafoya  MRN: [id number]  YOB: 1965  Provider Performing Procedure: Immanuel Atkinson DO   Primary Care Physician: Kayla Hilton MD      MEDICAL HISTORY       has a past medical history of Anxiety, Depression, Diabetes ([facility] Hypertension, and Seizures ([facility] SURGICAL HISTORY   has a past surgical history that includes Leg Surgery; Knee arthroscopy (Bilateral); Toe amputation (Bilateral); Pain management procedure (Bilateral, 12/14/2021); and Pain management procedure (Bilateral, 1/25/2022). ALLERGIES   Allergies as of 02/01/2022    (No Known Allergies)       MEDICATIONS   Prior to Admission medications    Medication Sig Start Date End Date Taking? Authorizing Provider   lisinopril (PRINIVIL;ZESTRIL) 10 MG tablet Take 10 mg by mouth daily   Yes Historical Provider, MD   glimepiride (AMARYL) 4 MG tablet Take 6 mg by mouth every morning   Yes Historical Provider, MD   insulin lispro (HUMALOG) 100 UNIT/ML injection vial Inject 0-15 Units into the skin 3 times daily (before meals)   Yes Historical Provider, MD   FLUoxetine (PROZAC) 10 MG capsule Take 10 mg by mouth daily    Historical Provider, MD   pregabalin (LYRICA) 75 MG capsule Take 1 capsule by mouth 3 times daily for 30 days. 2/28/21 1/25/22  Jalen Joyner MD   aspirin 81 MG chewable tablet Take 81 mg by mouth daily    Historical Provider, MD     PHYSICAL:   Vitals:    02/16/22 1311   BP: 114/65   Pulse: 82   Resp: 16   Temp: 98.1 °F (36.7 °C)   SpO2: 96%     PLANNED PROCEDURE   See procedure note  SEDATION  Planned agent: Versed and Fentanyl  ASA Classification: 2  Class 1: A normal healthy patient  Class 2: Pt with mild to moderate systemic disease  Class 3: Severe systemic disease or disturbance  Class 4: Severe systemic disorders that are already life threatening.   Class 5: Moribund pt with little chances of survival, for more than 24 hours.  Mallampati I Airway Classification: 2    1. Pre-procedure diagnostic studies complete and results available. 2. Previous sedation/anesthesia experiences assessed. 3. The patient is an appropriate candidate to undergo the planned procedure sedation and anesthesia. (Refer to nursing sedation/analgesia documentation record)  4. Formulation and discussion of sedation/procedure plan, risks, and expectations with patient and/or responsible adult completed. 5. Patient examined immediately prior to the procedure.  (Refer to nursing sedation/analgesia documentation record)    Elaine Busby DO  Electronically signed 2/16/2022 at 4:49 PM

## 2022-02-25 ENCOUNTER — TELEPHONE (OUTPATIENT)
Dept: PHYSICAL MEDICINE AND REHAB | Age: 57
End: 2022-02-25

## 2022-02-25 NOTE — TELEPHONE ENCOUNTER
LVM for pt to call regarding message left from preservice.   Pt states procedure did not work he has an appt follow up on 3/3/22

## 2022-02-28 NOTE — TELEPHONE ENCOUNTER
Pt. Called and LVM that his RFA did not work. Returned call. Advised pt. To keep follow up appointment to discuss procedure and further recommendations.

## 2022-03-03 ENCOUNTER — OFFICE VISIT (OUTPATIENT)
Dept: PHYSICAL MEDICINE AND REHAB | Age: 57
End: 2022-03-03
Payer: COMMERCIAL

## 2022-03-03 VITALS
SYSTOLIC BLOOD PRESSURE: 118 MMHG | OXYGEN SATURATION: 99 % | BODY MASS INDEX: 40.69 KG/M2 | HEART RATE: 70 BPM | HEIGHT: 73 IN | WEIGHT: 307 LBS | DIASTOLIC BLOOD PRESSURE: 74 MMHG

## 2022-03-03 DIAGNOSIS — G89.29 OTHER CHRONIC PAIN: ICD-10-CM

## 2022-03-03 DIAGNOSIS — M48.56XA PATHOLOGICAL COMPRESSION FRACTURE OF LUMBAR VERTEBRA, INITIAL ENCOUNTER (HCC): ICD-10-CM

## 2022-03-03 DIAGNOSIS — M48.061 SPINAL STENOSIS OF LUMBAR REGION WITHOUT NEUROGENIC CLAUDICATION: ICD-10-CM

## 2022-03-03 DIAGNOSIS — M47.816 LUMBAR SPONDYLOSIS: Primary | ICD-10-CM

## 2022-03-03 DIAGNOSIS — M47.816 LUMBAR FACET ARTHROPATHY: ICD-10-CM

## 2022-03-03 DIAGNOSIS — M17.0 PRIMARY OSTEOARTHRITIS OF BOTH KNEES: ICD-10-CM

## 2022-03-03 PROCEDURE — 3017F COLORECTAL CA SCREEN DOC REV: CPT | Performed by: ANESTHESIOLOGY

## 2022-03-03 PROCEDURE — G8484 FLU IMMUNIZE NO ADMIN: HCPCS | Performed by: ANESTHESIOLOGY

## 2022-03-03 PROCEDURE — G8417 CALC BMI ABV UP PARAM F/U: HCPCS | Performed by: ANESTHESIOLOGY

## 2022-03-03 PROCEDURE — G8427 DOCREV CUR MEDS BY ELIG CLIN: HCPCS | Performed by: ANESTHESIOLOGY

## 2022-03-03 PROCEDURE — 99214 OFFICE O/P EST MOD 30 MIN: CPT | Performed by: ANESTHESIOLOGY

## 2022-03-03 PROCEDURE — 1036F TOBACCO NON-USER: CPT | Performed by: ANESTHESIOLOGY

## 2022-03-03 RX ORDER — BACLOFEN 20 MG/1
TABLET ORAL
COMMUNITY
Start: 2022-03-02

## 2022-03-03 RX ORDER — CHLORTHALIDONE 50 MG/1
TABLET ORAL
Status: ON HOLD | COMMUNITY
Start: 2022-03-02 | End: 2022-04-06

## 2022-03-03 RX ORDER — ATORVASTATIN CALCIUM 20 MG/1
20 TABLET, FILM COATED ORAL DAILY
COMMUNITY
Start: 2022-03-02

## 2022-03-03 RX ORDER — CEPHALEXIN 500 MG/1
CAPSULE ORAL
Status: ON HOLD | COMMUNITY
Start: 2022-03-02 | End: 2022-04-06

## 2022-03-03 RX ORDER — UBIDECARENONE 100 MG
100 CAPSULE ORAL DAILY
COMMUNITY
Start: 2022-03-02 | End: 2022-08-24

## 2022-03-03 NOTE — PROGRESS NOTES
Chronic Pain/PM&R Clinic Note     Encounter Date: 3/3/22    Subjective:   Chief Complaint:   Chief Complaint   Patient presents with    Follow Up After Procedure       History of Present Illness:   Erica Amin is a 55 y.o. male seen in the clinic initially on 10/20/21 for his history of chronic knee pain. He has a medical history of anxiety/depression and T2DM is s/p multiple toe amputations. He has longstanding history of bilateral knee pain for the last several years. He endorses the majority of pain to be on the medial and lateral aspect of the knees at the joint line. He states that his knee pain is a constant 5/10 aching, stabbing pain. His knee pain is worse with any activities where he is trying to go up and down stairs and really has a difficult time being on his legs for an extended duration of time. He denies any further radiation down his legs. He does have some low back pain was found to have an L1 compression fracture several months ago and states that he did not undergo a kyphoplasty but is wondering how this can be addressed. He describes the majority of his back pain to be axial in nature slightly above his waistline. This pain is described as a constant aching, nagging, stabbing pain that he rates as a constant 6/10 pain. He denies any pain that radiates further down his legs, associated leg weakness, leg paresthesias, saddle anesthesia, bowel/bladder incontinence. Today, 3/3/2022, patient presents for planned follow-up for chronic low back and knee pain. He underwent the right-sided lumbar radiofrequency ablation on 2/16/2022. Patient has noticed some relief (>50%) in the right side of his low back. Patient would like to proceed with a left-sided lumbar radiofrequency ablation at L2-3 and L3-4 as previously discussed in clinic. Patient states he just wants to go back to work as his biggest issue is standing in one place for long period time. Patient denies any new symptoms. He denies any worsening radicular leg pain, new focal leg weakness, associated leg paresthesias, saddle anesthesia, bowel or bladder incontinence. Past Medical History:   Diagnosis Date    Anxiety     Depression     Diabetes ([facility] [address]     Hypertension     Seizures ([facility] [address]        Past Surgical History:   Procedure Laterality Date    KNEE ARTHROSCOPY Bilateral     LEG SURGERY      PAIN MANAGEMENT PROCEDURE Bilateral 12/14/2021    medial branch blocks at bilateral L2/L3 and L3/L[facility] 1/25/2022    Lumbar Facet MBB #2 L2-3,3-4 bilateral #2 [facility] Right 2/16/2022    radiofrequency ablations  L2/L3 and L3/L4 facet joint. right side first performed by Immanuel Atkinson DO at [address] [place] [address] TOE AMPUTATION Bilateral     multiple toes amputation       Family History   Problem Relation Age of Onset    Hypertension Mother     Diabetes Mother        Medications & Allergies:   Current Outpatient Medications   Medication Instructions    aspirin 81 mg, Oral, DAILY    atorvastatin (LIPITOR) 20 mg, Oral, DAILY    baclofen (LIORESAL) 20 MG tablet No dose, route, or frequency recorded.  cephALEXin (KEFLEX) 500 MG capsule No dose, route, or frequency recorded.  chlorthalidone (HYGROTEN) 50 MG tablet No dose, route, or frequency recorded.     coenzyme Q10 100 mg, Oral, DAILY    FLUoxetine (PROZAC) 10 mg, Oral, DAILY    glimepiride (AMARYL) 6 mg, Oral, EVERY MORNING    insulin lispro (HUMALOG) 0-15 Units, SubCUTAneous, 3 TIMES DAILY BEFORE MEALS    lisinopril (PRINIVIL;ZESTRIL) 10 mg, Oral, DAILY    pregabalin (LYRICA) 75 mg, Oral, 3 TIMES DAILY       No Known Allergies    Review of Systems:   Constitutional: negative for weight changes or fevers  Genitourinary: negative for bowel/bladder incontinence   Musculoskeletal: positive for low back pain and bilateral knee pain  Neurological: negative for any leg weakness or numbness/tingling  Behavioral/Psych: negative for anxiety/depression   All other systems reviewed and are negative    Objective:     Vitals:    03/03/22 1048   BP: 118/74   Pulse: 70   SpO2: 99%     Constitutional: Pleasant, no acute distress   Head: Normocephalic, atraumatic   Eyes: Conjunctivae normal   Neck: Supple, symmetrical   Lungs: Normal respiratory effort, non-labored breathing   Cardiovascular: Limbs warm and well perfused   Abdomen: Non-protruded   Musculoskeletal: Muscle bulk symmetric, no atrophy, no gross deformities   · Lower Extremities: ROM WNL. · Thorax: No paraspinal tenderness bilaterally. No scoliosis or kyphosis. · Lumbar Spine: ROM reduced in extension. Lumbar paraspinals tender to palpation bilaterally. SLR neg bilaterally. Positive facet loading bilaterally. - B/L Knees: TTP over medial and lateral joint lines. No appreciable fluid in knees. Ligamentous structures intact. Neurological: Cranial nerves II-XII grossly intact. · Gait - Antalgic gait. Ambulates without assistive device. · Motor: No focal motor deficits appreciated in lower limbs   Skin: No rashes or lesions present   Psychological: Cooperative, no exaggerated pain behaviors       Assessment:    Diagnosis Orders   1. Lumbar spondylosis  CHG FLUOR NEEDLE/CATH SPINE/PARASPINAL DX/THER ADDON    [place] RADIOFREQUENCY NEUROTOMY LUMBAR OR SACRAL, W IMAGE GUIDANCE, SINGLE    [place] RADIOFREQ NEUROTOMY LUMBAR OR SACRAL, W IMAGE GUIDE,EA ADDL LEVEL   2. Lumbar facet arthropathy     3. Pathological compression fracture of lumbar vertebra, initial encounter ([facility] [address]     4. Other chronic pain     5. Primary osteoarthritis of both knees     6. Spinal stenosis of lumbar region without neurogenic claudication           Dave Marrufo is a 55 y. o.male presenting to the pain clinic for evaluation of chronic low back and knee pain.   His clinical history and physical examination are consistent with lumbar facet mediated pain secondary to an L1 compression fracture. In addition, he has bilateral knee pain secondary advanced osteoarthritis. He has responded significantly to his diagnostic and confirmatory medial branch blocks targeting his facet joints L2/L3 and L3/L4. I set him up for thermal radiofrequency ablation targeting these joints. We will start with the right side first proceed with the left side at a later date. Patient underwent the right-sided lumbar RFA at L2-3 and L3-4. I have set him up for the left-sided lumbar RFA at these levels. Plan: The following treatment recommendations and plan were discussed in detail with Kory Winter. Imaging:   I have reviewed patients imaging of MRI L-spine and results were discussed with patient today. Interventions: In presence of lumbar axial back pain and with physical exam consistent for facetal pain secondary to L1 vertebral body compression fracture, thermal radiofrequency ablations at bilateral L2/L3 and L3/L4, LEFT facet joints was chosen. The risks and benefits were discussed in detail with the patient. Patient wants to proceed with the injection. Anticoagulation/NPO Recommendations:   Patient does not need to hold any medications prior to the procedure. Patient will need to be NPO x 8 hours prior to the procedure. We will start an IV prior to the procedure. Multidisciplinary Pain Management:   In the presence of complex, chronic, and multi-factorial pain, the importance of a multidisciplinary approach to pain management in the patients management regimen was emphasized and discussed in great detail.      Referrals:  None    Prescriptions Written This Visit:   None    Follow-up: For L-sided lumbar RFA at L2-3 and L3-4      Marty Worrell,   Interventional Pain Management/PM&R   [place]

## 2022-03-10 ENCOUNTER — PREP FOR PROCEDURE (OUTPATIENT)
Dept: PHYSICAL MEDICINE AND REHAB | Age: 57
End: 2022-03-10

## 2022-03-16 ENCOUNTER — TELEPHONE (OUTPATIENT)
Dept: PHYSICAL MEDICINE AND REHAB | Age: 57
End: 2022-03-16

## 2022-03-16 NOTE — TELEPHONE ENCOUNTER
Pt. did not come to todays procedure. Procedure and follow up cancelled. LVM at pt's and wife's phone number. Advised to call office.

## 2022-03-17 NOTE — TELEPHONE ENCOUNTER
Pt. Contacted. States that he tried to call the office yesterday morning to cancel his procedure but could not get in contact with anyone and then he got busy throughout the day at his work and was unable to call the office.  Follow up appointment scheduled 4/1/2022 @ 8:40am

## 2022-04-01 ENCOUNTER — OFFICE VISIT (OUTPATIENT)
Dept: PHYSICAL MEDICINE AND REHAB | Age: 57
End: 2022-04-01
Payer: COMMERCIAL

## 2022-04-01 VITALS
DIASTOLIC BLOOD PRESSURE: 78 MMHG | WEIGHT: 307 LBS | HEIGHT: 73 IN | SYSTOLIC BLOOD PRESSURE: 146 MMHG | BODY MASS INDEX: 40.69 KG/M2

## 2022-04-01 DIAGNOSIS — G89.29 OTHER CHRONIC PAIN: ICD-10-CM

## 2022-04-01 DIAGNOSIS — M48.56XA PATHOLOGICAL COMPRESSION FRACTURE OF LUMBAR VERTEBRA, INITIAL ENCOUNTER (HCC): ICD-10-CM

## 2022-04-01 DIAGNOSIS — M47.816 LUMBAR SPONDYLOSIS: Primary | ICD-10-CM

## 2022-04-01 DIAGNOSIS — M47.816 LUMBAR FACET ARTHROPATHY: ICD-10-CM

## 2022-04-01 PROCEDURE — G8417 CALC BMI ABV UP PARAM F/U: HCPCS | Performed by: ANESTHESIOLOGY

## 2022-04-01 PROCEDURE — 99214 OFFICE O/P EST MOD 30 MIN: CPT | Performed by: ANESTHESIOLOGY

## 2022-04-01 PROCEDURE — 3017F COLORECTAL CA SCREEN DOC REV: CPT | Performed by: ANESTHESIOLOGY

## 2022-04-01 PROCEDURE — 1036F TOBACCO NON-USER: CPT | Performed by: ANESTHESIOLOGY

## 2022-04-01 PROCEDURE — G8427 DOCREV CUR MEDS BY ELIG CLIN: HCPCS | Performed by: ANESTHESIOLOGY

## 2022-04-01 NOTE — PROGRESS NOTES
Chronic Pain/PM&R Clinic Note     Encounter Date: 4/1/22    Subjective:   Chief Complaint:   Chief Complaint   Patient presents with    Follow-up       History of Present Illness:   Kathi Reed is a 55 y.o. male seen in the clinic initially on 10/20/21 for his history of chronic knee pain. He has a medical history of anxiety/depression and T2DM is s/p multiple toe amputations. He has longstanding history of bilateral knee pain for the last several years. He endorses the majority of pain to be on the medial and lateral aspect of the knees at the joint line. He states that his knee pain is a constant 5/10 aching, stabbing pain. His knee pain is worse with any activities where he is trying to go up and down stairs and really has a difficult time being on his legs for an extended duration of time. He denies any further radiation down his legs. He does have some low back pain was found to have an L1 compression fracture several months ago and states that he did not undergo a kyphoplasty but is wondering how this can be addressed. He describes the majority of his back pain to be axial in nature slightly above his waistline. This pain is described as a constant aching, nagging, stabbing pain that he rates as a constant 6/10 pain. He denies any pain that radiates further down his legs, associated leg weakness, leg paresthesias, saddle anesthesia, bowel/bladder incontinence. Today, 4/1/2022, patient presents for planned follow-up for chronic low back and knee pain. He has completed his right-sided lumbar radiofrequency ablation back in February and has noticed significant improvement in his low back. He states that he would like to proceed with the left side as previously discussed. He states he missed his procedure appointment due to car issues with his son. He denies any other symptoms this point.     Past Medical History:   Diagnosis Date    Anxiety     Depression     Diabetes ([facility] [address]     Hypertension     Seizures ([facility] [address]        Past Surgical History:   Procedure Laterality Date    KNEE ARTHROSCOPY Bilateral     LEG SURGERY      PAIN MANAGEMENT PROCEDURE Bilateral 12/14/2021    medial branch blocks at bilateral L2/L3 and L3/L4 performed by Fatoumata Storey DO at [address] Bilateral 1/25/2022    Lumbar Facet MBB #2 L2-3,3-4 bilateral #2 performed by Fatoumata Storey DO at [address] Right 2/16/2022    radiofrequency ablations  L2/L3 and L3/L4 facet joint. right side first performed by Fatoumata Storey DO at [address] [place] [address] TOE AMPUTATION Bilateral     multiple toes amputation       Family History   Problem Relation Age of Onset    Hypertension Mother     Diabetes Mother        Medications & Allergies:   Current Outpatient Medications   Medication Instructions    aspirin 81 mg, Oral, DAILY    atorvastatin (LIPITOR) 20 mg, Oral, DAILY    baclofen (LIORESAL) 20 MG tablet No dose, route, or frequency recorded.  cephALEXin (KEFLEX) 500 MG capsule No dose, route, or frequency recorded.  chlorthalidone (HYGROTEN) 50 MG tablet No dose, route, or frequency recorded.     coenzyme Q10 100 mg, Oral, DAILY    FLUoxetine (PROZAC) 10 mg, Oral, DAILY    glimepiride (AMARYL) 6 mg, Oral, EVERY MORNING    insulin lispro (HUMALOG) 0-15 Units, SubCUTAneous, 3 TIMES DAILY BEFORE MEALS    lisinopril (PRINIVIL;ZESTRIL) 10 mg, Oral, DAILY    metFORMIN (GLUCOPHAGE) 500 mg, Oral, DAILY WITH BREAKFAST    pregabalin (LYRICA) 75 mg, Oral, 3 TIMES DAILY       No Known Allergies    Review of Systems:   Constitutional: negative for weight changes or fevers  Genitourinary: negative for bowel/bladder incontinence   Musculoskeletal: positive for low back pain and bilateral knee pain  Neurological: negative for any leg weakness or numbness/tingling  Behavioral/Psych: negative for anxiety/depression   All other systems reviewed and are negative    Objective:     Vitals:    04/01/22 0846   BP: (!) 146/78     Constitutional: Pleasant, no acute distress   Head: Normocephalic, atraumatic   Eyes: Conjunctivae normal   Neck: Supple, symmetrical   Lungs: Normal respiratory effort, non-labored breathing   Cardiovascular: Limbs warm and well perfused   Abdomen: Non-protruded   Musculoskeletal: Muscle bulk symmetric, no atrophy, no gross deformities   · Lower Extremities: ROM WNL. · Thorax: No paraspinal tenderness bilaterally. No scoliosis or kyphosis. · Lumbar Spine: ROM reduced in extension. Lumbar paraspinals tender to palpation bilaterally. SLR neg bilaterally. Positive facet loading bilaterally. - B/L Knees: TTP over medial and lateral joint lines. No appreciable fluid in knees. Ligamentous structures intact. Neurological: Cranial nerves II-XII grossly intact. · Gait - Antalgic gait. Ambulates without assistive device. · Motor: No focal motor deficits appreciated in lower limbs   Skin: No rashes or lesions present   Psychological: Cooperative, no exaggerated pain behaviors       Assessment:    Diagnosis Orders   1. Lumbar spondylosis     2. Lumbar facet arthropathy     3. Pathological compression fracture of lumbar vertebra, initial encounter ([facility] [address]     4. Other chronic pain           Marshall Caballero is a 55 y. o.male presenting to the pain clinic for evaluation of chronic low back and knee pain. His clinical history and physical examination are consistent with lumbar facet mediated pain secondary to an L1 compression fracture. In addition, he has bilateral knee pain secondary advanced osteoarthritis. He has responded significantly to his diagnostic and confirmatory medial branch blocks targeting his facet joints L2/L3 and L3/L4. I set him up for thermal radiofrequency ablation targeting these joints. We will start with the right side first proceed with the left side at a later date.      Patient underwent the right-sided lumbar RFA at L2-3 and L3-4. I have set him up for the left-sided lumbar RFA at these levels. Plan: The following treatment recommendations and plan were discussed in detail with Ivet Hernandez. Imaging:   I have reviewed patients imaging of MRI L-spine and results were discussed with patient today. Interventions: In presence of lumbar axial back pain and with physical exam consistent for facetal pain secondary to L1 vertebral body compression fracture, thermal radiofrequency ablations at bilateral L2/L3 and L3/L4, LEFT facet joints was chosen. The risks and benefits were discussed in detail with the patient. Patient wants to proceed with the injection. Anticoagulation/NPO Recommendations:   Patient does not need to hold any medications prior to the procedure. Patient will need to be NPO x 8 hours prior to the procedure. We will start an IV prior to the procedure. Multidisciplinary Pain Management:   In the presence of complex, chronic, and multi-factorial pain, the importance of a multidisciplinary approach to pain management in the patients management regimen was emphasized and discussed in great detail.      Referrals:  None    Prescriptions Written This Visit:   None    Follow-up: For L-sided lumbar RFA at L2-3 and L3-4      Marty Worrell, DO  Interventional Pain Management/PM&R   [place]

## 2022-04-05 NOTE — H&P
Today, patient presents for planned  thermal radiofrequency ablations at LEFT L2/L3 and L3/L4 facet joints. This note is reflective of the patient's previous visit for evaluation. We will proceed with today's planned procedure. Since patient's last visit for evaluation, there have been no interval changes in medical history. Patient has no new numbness, weakness, or focal neurological deficit since evaluation. Patient has no contraindications to injection (no anticoagulation or recent antibiotic intake for active infections), and has a  present or is able to drive themselves (as discussed and cleared by physician). Allergies to latex, contrast dye, and steroid medications have been confirmed with the patient prior to the procedure. NPO necessity has been assessed and accepted based on procedure complexity. The risks and benefits of the procedure have been explained including but are not limited to infection, bleeding, paralysis, immediate post procedure weakness, and dizziness; the patient acknowledges understanding and desires to proceed with the procedure. Patient has signed consent for same procedure as discussed in previous clinic encounter. All other questions and concerns were addressed at bedside. See procedure note for full details. Post procedure Instructions: The patient was advised not to drive during the day of the procedure and not to engage in any significant decision making (unless otherwise states by physician). The patient was also advised to be cautious with walking/activity for 24 hours following today's visit and asked not to engage in over-exertion (unless otherwise states by physician). After this time, it is ok to resume pre-procedure level of activity. Patient advised to apply ice to site of injection in situations of pain and discomfort. Patient advised to not submerge site of injection during bath or pool activities for approximately 24 hours post-procedure.  Patient attested to understanding post procedure directions / restrictions. All other questions and concerns addressed before patient discharge in ambulatory fashion. Chronic Pain/PM&R Clinic Note     Encounter Date: 4/1/22    Subjective:   Chief Complaint:   No chief complaint on file. History of Present Illness:   Pascual Batista is a 55 y.o. male seen in the clinic initially on 10/20/21 for his history of chronic knee pain. He has a medical history of anxiety/depression and T2DM is s/p multiple toe amputations. He has longstanding history of bilateral knee pain for the last several years. He endorses the majority of pain to be on the medial and lateral aspect of the knees at the joint line. He states that his knee pain is a constant 5/10 aching, stabbing pain. His knee pain is worse with any activities where he is trying to go up and down stairs and really has a difficult time being on his legs for an extended duration of time. He denies any further radiation down his legs. He does have some low back pain was found to have an L1 compression fracture several months ago and states that he did not undergo a kyphoplasty but is wondering how this can be addressed. He describes the majority of his back pain to be axial in nature slightly above his waistline. This pain is described as a constant aching, nagging, stabbing pain that he rates as a constant 6/10 pain. He denies any pain that radiates further down his legs, associated leg weakness, leg paresthesias, saddle anesthesia, bowel/bladder incontinence. Today, 4/1/2022, patient presents for planned follow-up for chronic low back and knee pain. He has completed his right-sided lumbar radiofrequency ablation back in February and has noticed significant improvement in his low back. He states that he would like to proceed with the left side as previously discussed. He states he missed his procedure appointment due to car issues with his son.   He denies any other symptoms this point. Past Medical History:   Diagnosis Date    Anxiety     Depression     Diabetes ([facility] [address]     Hypertension     Seizures ([facility] [address]        Past Surgical History:   Procedure Laterality Date    KNEE ARTHROSCOPY Bilateral     LEG SURGERY      PAIN MANAGEMENT PROCEDURE Bilateral 12/14/2021    medial branch blocks at bilateral L2/L3 and L3/L4 performed by Tj Tirado DO at [address] Bilateral 1/25/2022    Lumbar Facet MBB #2 L2-3,3-4 bilateral #[facility] 2/16/2022    radiofrequency ablations  L2/L3 and L3/L4 facet joint. right side first performed by Tj Tirado DO at [address] [place] [address] TOE AMPUTATION Bilateral     multiple toes amputation       Family History   Problem Relation Age of Onset    Hypertension Mother     Diabetes Mother        Medications & Allergies:   Current Outpatient Medications   Medication Instructions    aspirin 81 mg, Oral, DAILY    atorvastatin (LIPITOR) 20 mg, Oral, DAILY    baclofen (LIORESAL) 20 MG tablet No dose, route, or frequency recorded.  cephALEXin (KEFLEX) 500 MG capsule No dose, route, or frequency recorded.  chlorthalidone (HYGROTEN) 50 MG tablet No dose, route, or frequency recorded.     coenzyme Q10 100 mg, Oral, DAILY    FLUoxetine (PROZAC) 10 mg, Oral, DAILY    glimepiride (AMARYL) 6 mg, Oral, EVERY MORNING    insulin lispro (HUMALOG) 0-15 Units, SubCUTAneous, 3 TIMES DAILY BEFORE MEALS    lisinopril (PRINIVIL;ZESTRIL) 10 mg, Oral, DAILY    metFORMIN (GLUCOPHAGE) 500 mg, Oral, DAILY WITH BREAKFAST    pregabalin (LYRICA) 75 mg, Oral, 3 TIMES DAILY       No Known Allergies    Review of Systems:   Constitutional: negative for weight changes or fevers  Genitourinary: negative for bowel/bladder incontinence   Musculoskeletal: positive for low back pain and bilateral knee pain  Neurological: negative for any leg weakness or numbness/tingling  Behavioral/Psych: negative for anxiety/depression   All other systems reviewed and are negative    Objective: There were no vitals filed for this visit. Constitutional: Pleasant, no acute distress   Head: Normocephalic, atraumatic   Eyes: Conjunctivae normal   Neck: Supple, symmetrical   Lungs: Normal respiratory effort, non-labored breathing   Cardiovascular: Limbs warm and well perfused   Abdomen: Non-protruded   Musculoskeletal: Muscle bulk symmetric, no atrophy, no gross deformities   · Lower Extremities: ROM WNL. · Thorax: No paraspinal tenderness bilaterally. No scoliosis or kyphosis. · Lumbar Spine: ROM reduced in extension. Lumbar paraspinals tender to palpation bilaterally. SLR neg bilaterally. Positive facet loading bilaterally. - B/L Knees: TTP over medial and lateral joint lines. No appreciable fluid in knees. Ligamentous structures intact. Neurological: Cranial nerves II-XII grossly intact. · Gait - Antalgic gait. Ambulates without assistive device. · Motor: No focal motor deficits appreciated in lower limbs   Skin: No rashes or lesions present   Psychological: Cooperative, no exaggerated pain behaviors       Assessment:    Diagnosis Orders   1. Lumbar spondylosis     2. Lumbar facet arthropathy     3. Pathological compression fracture of lumbar vertebra, initial encounter ([facility] [address]     4. Other chronic pain           Clayton Baldwin is a 55 y. o.male presenting to the pain clinic for evaluation of chronic low back and knee pain. His clinical history and physical examination are consistent with lumbar facet mediated pain secondary to an L1 compression fracture. In addition, he has bilateral knee pain secondary advanced osteoarthritis. He has responded significantly to his diagnostic and confirmatory medial branch blocks targeting his facet joints L2/L3 and L3/L4.   I set him up for thermal radiofrequency ablation targeting these joints. We will start with the right side first proceed with the left side at a later date. Patient underwent the right-sided lumbar RFA at L2-3 and L3-4. I have set him up for the left-sided lumbar RFA at these levels. Plan: The following treatment recommendations and plan were discussed in detail with Yany Webb. Imaging:   I have reviewed patients imaging of MRI L-spine and results were discussed with patient today. Interventions: In presence of lumbar axial back pain and with physical exam consistent for facetal pain secondary to L1 vertebral body compression fracture, thermal radiofrequency ablations at bilateral L2/L3 and L3/L4, LEFT facet joints was chosen. The risks and benefits were discussed in detail with the patient. Patient wants to proceed with the injection. Anticoagulation/NPO Recommendations:   Patient does not need to hold any medications prior to the procedure. Patient will need to be NPO x 8 hours prior to the procedure. We will start an IV prior to the procedure. Multidisciplinary Pain Management:   In the presence of complex, chronic, and multi-factorial pain, the importance of a multidisciplinary approach to pain management in the patients management regimen was emphasized and discussed in great detail.      Referrals:  None    Prescriptions Written This Visit:   None    Follow-up: For L-sided lumbar RFA at L2-3 and L3-4      Marty Worrell, DO  Interventional Pain Management/PM&R   [place]

## 2022-04-06 ENCOUNTER — PREP FOR PROCEDURE (OUTPATIENT)
Dept: PHYSICAL MEDICINE AND REHAB | Age: 57
End: 2022-04-06

## 2022-04-06 ENCOUNTER — HOSPITAL ENCOUNTER (OUTPATIENT)
Age: 57
Setting detail: OUTPATIENT SURGERY
Discharge: HOME | End: 2022-04-06
Attending: ANESTHESIOLOGY | Admitting: ANESTHESIOLOGY
Payer: COMMERCIAL

## 2022-04-06 ENCOUNTER — APPOINTMENT (OUTPATIENT)
Dept: GENERAL RADIOLOGY | Age: 57
End: 2022-04-06
Attending: ANESTHESIOLOGY
Payer: COMMERCIAL

## 2022-04-06 ENCOUNTER — TELEPHONE (OUTPATIENT)
Dept: PHYSICAL MEDICINE AND REHAB | Age: 57
End: 2022-04-06

## 2022-04-06 VITALS
OXYGEN SATURATION: 97 % | TEMPERATURE: 97.1 F | HEART RATE: 68 BPM | SYSTOLIC BLOOD PRESSURE: 125 MMHG | RESPIRATION RATE: 18 BRPM | DIASTOLIC BLOOD PRESSURE: 66 MMHG

## 2022-04-06 LAB — GLUCOSE BLD-MCNC: 158 MG/DL (ref 70–108)

## 2022-04-06 PROCEDURE — 3600000054 HC PAIN LEVEL 3 BASE: Performed by: ANESTHESIOLOGY

## 2022-04-06 PROCEDURE — 7100000010 HC PHASE II RECOVERY - FIRST 15 MIN: Performed by: ANESTHESIOLOGY

## 2022-04-06 PROCEDURE — 64635 DESTROY LUMB/SAC FACET JNT: CPT | Performed by: ANESTHESIOLOGY

## 2022-04-06 PROCEDURE — 82948 REAGENT STRIP/BLOOD GLUCOSE: CPT

## 2022-04-06 PROCEDURE — 3209999900 FLUORO FOR SURGICAL PROCEDURES

## 2022-04-06 PROCEDURE — 99152 MOD SED SAME PHYS/QHP 5/>YRS: CPT | Performed by: ANESTHESIOLOGY

## 2022-04-06 PROCEDURE — 2500000003 HC RX 250 WO HCPCS: Performed by: ANESTHESIOLOGY

## 2022-04-06 PROCEDURE — 3600000055 HC PAIN LEVEL 3 ADDL 15 MIN: Performed by: ANESTHESIOLOGY

## 2022-04-06 PROCEDURE — 7100000011 HC PHASE II RECOVERY - ADDTL 15 MIN: Performed by: ANESTHESIOLOGY

## 2022-04-06 PROCEDURE — 6360000002 HC RX W HCPCS: Performed by: ANESTHESIOLOGY

## 2022-04-06 PROCEDURE — 2709999900 HC NON-CHARGEABLE SUPPLY: Performed by: ANESTHESIOLOGY

## 2022-04-06 PROCEDURE — 64636 DESTROY L/S FACET JNT ADDL: CPT | Performed by: ANESTHESIOLOGY

## 2022-04-06 RX ORDER — LIDOCAINE HYDROCHLORIDE 20 MG/ML
INJECTION, SOLUTION EPIDURAL; INFILTRATION; INTRACAUDAL; PERINEURAL PRN
Status: DISCONTINUED | OUTPATIENT
Start: 2022-04-06 | End: 2022-04-06 | Stop reason: ALTCHOICE

## 2022-04-06 RX ORDER — MIDAZOLAM HYDROCHLORIDE 1 MG/ML
INJECTION INTRAMUSCULAR; INTRAVENOUS PRN
Status: DISCONTINUED | OUTPATIENT
Start: 2022-04-06 | End: 2022-04-06 | Stop reason: ALTCHOICE

## 2022-04-06 RX ORDER — FENTANYL CITRATE 50 UG/ML
INJECTION, SOLUTION INTRAMUSCULAR; INTRAVENOUS PRN
Status: DISCONTINUED | OUTPATIENT
Start: 2022-04-06 | End: 2022-04-06 | Stop reason: ALTCHOICE

## 2022-04-06 RX ORDER — LIDOCAINE HYDROCHLORIDE 10 MG/ML
INJECTION, SOLUTION EPIDURAL; INFILTRATION; INTRACAUDAL; PERINEURAL PRN
Status: DISCONTINUED | OUTPATIENT
Start: 2022-04-06 | End: 2022-04-06 | Stop reason: ALTCHOICE

## 2022-04-06 ASSESSMENT — PAIN DESCRIPTION - ORIENTATION: ORIENTATION: LOWER

## 2022-04-06 ASSESSMENT — PAIN DESCRIPTION - PAIN TYPE: TYPE: CHRONIC PAIN

## 2022-04-06 ASSESSMENT — PAIN DESCRIPTION - LOCATION: LOCATION: BACK

## 2022-04-06 ASSESSMENT — PAIN SCALES - GENERAL: PAINLEVEL_OUTOF10: 6

## 2022-04-06 NOTE — PROCEDURES
Pre-operative Diagnosis: Lumbar facet pain     Post-operative Diagnosis: Lumbar facet pain     Procedure: LEFT lumbar thermal radiofrequency ablation targeting facet joints L2/L3 and L3/L4     Procedure Description:  After consent was obtained, the patient was placed in the prone position having pressure points appropriately padded. The lower back was prepped with chloraprep and draped in a sterile fashion.  0.5 cc of 1 % lidocaine was used for local anesthesia of the skin and superficial subcutaneous tissues. Three 20-gauge 100mm SMK cannula with a 10-mm active tip were advanced under fluoroscopy in an AP view with caudad angulation on to junction of the LEFT superior articular process and the transverse process of the L2, L4, and L4 vertebra and at the sacral ala. There were no paresthesias, heme or CSF obtained.  Needle placement was confirmed using AP and oblique views.      Sensory and motor stimulation at 50Hz and 2Hz and impedance measurements were carried out having reached threshold at:     LEFT  L2: 0.2V/3V/200 Ohms  L3: 0.2V/3V/190 Ohms  L4: 0.2V/3V/180 Ohms     Then, 1cc of 2 % Lidocaine was injected at the site. Temperature was then raised to 80 degrees centigrade for 90 seconds with a 15 second temperature ramp. No pain was reported during the lesioning. The needles were then withdrawn without complications.  The patient tolerated the procedure well and was transported to the recovery room where he was observed for 15 minutes to then be discharged in ambulatory fashion.     Procedural Complications: None  Estimated Blood Loss: 0 mL     IV sedation was used during the procedure:  - Moderate intravenous conscious sedation was supervised by Dr. Timothy Yost  - The patient was independently monitored by a Registered Nurse assigned to the procedure room  - Monitoring included automated blood pressure, continuous EKG, and continuous pulse oximetry  - The detailed conscious record is permanently stored in the [facility] following is the conscious sedation record:  Start Time: 09:34  End Time : 09:49  Duration: 15 minutes   Medications Administered: 1 mg Versed, 50 mcg Fentanyl         Marty Worrell DO  Interventional Pain Management/PM&R   [facility]

## 2022-04-06 NOTE — TELEPHONE ENCOUNTER
LVM for pt. regarding need for  to bring him, stay on premises and take him home due to IV sedation and risks involved with driving himself. Advised to call office regarding this.

## 2022-04-06 NOTE — POST SEDATION
[address] [address]  Sedation/Analgesia Post Sedation Record    Pt Name: Paris Gonzalez  MRN: [id number]  YOB: 1965  Procedure Performed By: Silvestre Guajardo DO  Primary Care Physician: Pino Anderson MD    POST-PROCEDURE    Physicians/Assistants: Silvestre Guajardo DO  Procedure Performed: See Procedure Note   Sedation/Anesthesia: Versed and Fentanyl (See procedure note for amount and duration)  Estimated Blood Loss:     0  ml  Specimens Removed: None        Complications: None           Marty Mckenzie DO  Electronically signed 4/6/2022 at 10:32 AM

## 2022-04-06 NOTE — PRE SEDATION
[address] [address]  Pre-Sedation/Analgesia History & Physical    Pt Name: Marshall Caballero  MRN: [id number]  YOB: 1965  Provider Performing Procedure: Rachel Flores DO   Primary Care Physician: Trina Hanson MD      MEDICAL HISTORY       has a past medical history of Anxiety, Depression, Diabetes ([facility] [address] Hyperlipidemia, Hypertension, and Seizures ([facility] [address] SURGICAL HISTORY   has a past surgical history that includes Leg Surgery; Knee arthroscopy (Bilateral); Toe amputation (Bilateral); Pain management procedure (Bilateral, 12/14/2021); Pain management procedure (Bilateral, 1/25/2022); and Pain management procedure (Right, 2/16/2022). ALLERGIES   Allergies as of 04/01/2022    (No Known Allergies)       MEDICATIONS   Prior to Admission medications    Medication Sig Start Date End Date Taking? Authorizing Provider   metFORMIN (GLUCOPHAGE) 500 MG tablet Take 500 mg by mouth daily (with breakfast)    Historical Provider, MD   atorvastatin (LIPITOR) 20 MG tablet Take 20 mg by mouth daily 3/2/22   Historical Provider, MD   baclofen (LIORESAL) 20 MG tablet  3/2/22   Historical Provider, MD   coenzyme Q10 100 MG CAPS capsule Take 100 mg by mouth daily 3/2/22   Historical Provider, MD   lisinopril (PRINIVIL;ZESTRIL) 10 MG tablet Take 10 mg by mouth daily    Historical Provider, MD   FLUoxetine (PROZAC) 10 MG capsule Take 10 mg by mouth daily    Historical Provider, MD   pregabalin (LYRICA) 75 MG capsule Take 1 capsule by mouth 3 times daily for 30 days.  2/28/21 4/1/22  Bro Reeves MD   glimepiride (AMARYL) 4 MG tablet Take 6 mg by mouth every morning    Historical Provider, MD   insulin lispro (HUMALOG) 100 UNIT/ML injection vial Inject 0-15 Units into the skin 3 times daily (before meals)    Historical Provider, MD   aspirin 81 MG chewable tablet Take 81 mg by mouth daily    Historical Provider, MD     PHYSICAL:   Vitals:    04/06/22 0949   BP: 125/66   Pulse: 68   Resp: 18   Temp: 97.1 °F (36.2 °C)   SpO2: 97%     PLANNED PROCEDURE   See procedure note  SEDATION  Planned agent: Versed and Fentanyl  ASA Classification: 2  Class 1: A normal healthy patient  Class 2: Pt with mild to moderate systemic disease  Class 3: Severe systemic disease or disturbance  Class 4: Severe systemic disorders that are already life threatening. Class 5: Moribund pt with little chances of survival, for more than 24 hours. Mallampati I Airway Classification: 2    1. Pre-procedure diagnostic studies complete and results available. 2. Previous sedation/anesthesia experiences assessed. 3. The patient is an appropriate candidate to undergo the planned procedure sedation and anesthesia. (Refer to nursing sedation/analgesia documentation record)  4. Formulation and discussion of sedation/procedure plan, risks, and expectations with patient and/or responsible adult completed. 5. Patient examined immediately prior to the procedure.  (Refer to nursing sedation/analgesia documentation record)    Silvestre Guajardo DO  Electronically signed 4/6/2022 at 10:32 AM

## 2022-04-06 NOTE — PROGRESS NOTES
0294- Pt arrived to PACU phase 2, Diana RN at bedside for report, pt hooked up to monitor, VSS, pt breathing deeply on room air.   0951- Pt didn't want anything to eat or drink. Updated needs to stay about 15-20 min or so.  1001- IV removed, pt getting dressed, pt wanted to wait on bench pt instructed he has to stay here til ride pulls up. 1010- pt ambulated to bathroom. 1014- Pt discharged without this RN, walked out from the bathroom on his own.

## 2022-08-19 ENCOUNTER — TELEPHONE (OUTPATIENT)
Dept: PHYSICAL MEDICINE AND REHAB | Age: 57
End: 2022-08-19

## 2022-08-19 NOTE — TELEPHONE ENCOUNTER
Patient is calling to request an appt to get back in to the office. He was last seen 04/01/2022. He states that his back pain has returned and is worse. First available with Dr. Luis Clark is 09/22/2022. Patient is scheduled for that appt but wanted to know if it would be possible for him to be seen by anyone else at the office sooner or prior to that?   Please advise

## 2022-08-24 ENCOUNTER — OFFICE VISIT (OUTPATIENT)
Dept: PHYSICAL MEDICINE AND REHAB | Age: 57
End: 2022-08-24
Payer: COMMERCIAL

## 2022-08-24 VITALS
DIASTOLIC BLOOD PRESSURE: 68 MMHG | BODY MASS INDEX: 40.69 KG/M2 | WEIGHT: 307 LBS | SYSTOLIC BLOOD PRESSURE: 154 MMHG | HEIGHT: 73 IN

## 2022-08-24 DIAGNOSIS — M47.816 LUMBAR SPONDYLOSIS: Primary | ICD-10-CM

## 2022-08-24 DIAGNOSIS — M17.0 PRIMARY OSTEOARTHRITIS OF BOTH KNEES: ICD-10-CM

## 2022-08-24 DIAGNOSIS — M47.816 LUMBAR FACET ARTHROPATHY: ICD-10-CM

## 2022-08-24 DIAGNOSIS — T87.40: ICD-10-CM

## 2022-08-24 DIAGNOSIS — G89.29 OTHER CHRONIC PAIN: ICD-10-CM

## 2022-08-24 DIAGNOSIS — M48.56XA PATHOLOGICAL COMPRESSION FRACTURE OF LUMBAR VERTEBRA, INITIAL ENCOUNTER (HCC): ICD-10-CM

## 2022-08-24 PROCEDURE — G8427 DOCREV CUR MEDS BY ELIG CLIN: HCPCS | Performed by: ANESTHESIOLOGY

## 2022-08-24 PROCEDURE — 3017F COLORECTAL CA SCREEN DOC REV: CPT | Performed by: ANESTHESIOLOGY

## 2022-08-24 PROCEDURE — G8417 CALC BMI ABV UP PARAM F/U: HCPCS | Performed by: ANESTHESIOLOGY

## 2022-08-24 PROCEDURE — 1036F TOBACCO NON-USER: CPT | Performed by: ANESTHESIOLOGY

## 2022-08-24 PROCEDURE — 99214 OFFICE O/P EST MOD 30 MIN: CPT | Performed by: ANESTHESIOLOGY

## 2022-08-24 RX ORDER — RIVAROXABAN 20 MG/1
TABLET, FILM COATED ORAL
COMMUNITY
Start: 2022-08-01

## 2022-08-24 RX ORDER — OXYCODONE HYDROCHLORIDE AND ACETAMINOPHEN 5; 325 MG/1; MG/1
1 TABLET ORAL 2 TIMES DAILY PRN
Qty: 60 TABLET | Refills: 0 | Status: SHIPPED | OUTPATIENT
Start: 2022-08-24 | End: 2022-09-20 | Stop reason: SDUPTHER

## 2022-08-24 NOTE — PROGRESS NOTES
Chronic Pain/PM&R Clinic Note     Encounter Date: 8/24/22    Subjective:   Chief Complaint:   Chief Complaint   Patient presents with    Follow-up     Increased back pain          History of Present Illness:   Soto Vo is a 55 y.o. male seen in the clinic initially on 10/20/21 for his history of chronic knee pain. He has a medical history of anxiety/depression and T2DM is s/p multiple toe amputations. He has longstanding history of bilateral knee pain for the last several years. He endorses the majority of pain to be on the medial and lateral aspect of the knees at the joint line. He states that his knee pain is a constant 5/10 aching, stabbing pain. His knee pain is worse with any activities where he is trying to go up and down stairs and really has a difficult time being on his legs for an extended duration of time. He denies any further radiation down his legs. He does have some low back pain was found to have an L1 compression fracture several months ago and states that he did not undergo a kyphoplasty but is wondering how this can be addressed. He describes the majority of his back pain to be axial in nature slightly above his waistline. This pain is described as a constant aching, nagging, stabbing pain that he rates as a constant 6/10 pain. He denies any pain that radiates further down his legs, associated leg weakness, leg paresthesias, saddle anesthesia, bowel/bladder incontinence. Today, 8/24/2022, patient presents for planned follow-up for chronic low back and knee pain. He unfortunately ended up having right toe amputations done by Dr. Griselda Fair due to a nonhealing ulcer. He states that since he has been on his knee scooter he has noticed worsening back pain and is really struggled overall with exacerbation of chronic low back pain.   He states that sugars have been somewhat well controlled he has a wound VAC on and is currently looking to achieve weightbearing status on the right foot.  He is wondering if he can be put on some pain pills to kind to help him recover and get through this time and improve his function. Past Medical History:   Diagnosis Date    Anxiety     Depression     Diabetes ([facility] [address]     Hyperlipidemia     Hypertension     Seizures ([facility] [address]        Past Surgical History:   Procedure Laterality Date    KNEE ARTHROSCOPY Bilateral     LEG SURGERY      PAIN MANAGEMENT PROCEDURE Bilateral 12/14/2021    medial branch blocks at bilateral L2/L3 and L3/L4 performed by Keila Mejia DO at [facility] Bilateral 1/25/2022    Lumbar Facet MBB #2 L2-3,3-4 bilateral #2 performed by Keila Mejia DO at [facility] Right 2/16/2022    radiofrequency ablations  L2/L3 and L3/L4 facet joint. right side first performed by Keila Mejia DO at [facility] Left 4/6/2022    L-sided lumbar RFA at L2-3 and L3-4 performed by Keila Mejia DO at [address] [place] [address] Bilateral     multiple toes amputation       Family History   Problem Relation Age of Onset    Hypertension Mother     Diabetes Mother        Medications & Allergies:   Current Outpatient Medications   Medication Instructions    aspirin 81 mg, Oral, DAILY    atorvastatin (LIPITOR) 20 mg, Oral, DAILY    baclofen (LIORESAL) 20 MG tablet No dose, route, or frequency recorded.     FLUoxetine (PROZAC) 10 mg, Oral, DAILY    glimepiride (AMARYL) 6 mg, Oral, EVERY MORNING    insulin lispro (HUMALOG) 0-15 Units, SubCUTAneous, 3 TIMES DAILY BEFORE MEALS    lisinopril (PRINIVIL;ZESTRIL) 10 mg, Oral, DAILY    metFORMIN (GLUCOPHAGE) 500 mg, Oral, DAILY WITH BREAKFAST    oxyCODONE-acetaminophen (PERCOCET) 5-325 MG per tablet 1 tablet, Oral, 2 TIMES DAILY PRN    pregabalin (LYRICA) 75 mg, Oral, 3 TIMES DAILY    XARELTO 20 MG TABS tablet TAKE 1 TABLET BY MOUTH ONCE DAILY WITH DINNER       No Known Allergies    Review of Systems:   Constitutional: negative for weight changes or fevers  Genitourinary: negative for bowel/bladder incontinence   Musculoskeletal: positive for low back pain and bilateral knee pain  Neurological: negative for any leg weakness or numbness/tingling  Behavioral/Psych: negative for anxiety/depression   All other systems reviewed and are negative    Objective:     Vitals:    08/24/22 1419   BP: (!) 154/68     Constitutional: Pleasant, no acute distress   Head: Normocephalic, atraumatic   Eyes: Conjunctivae normal   Neck: Supple, symmetrical   Lungs: Normal respiratory effort, non-labored breathing   Cardiovascular: Limbs warm and well perfused   Abdomen: Non-protruded   Musculoskeletal: Muscle bulk symmetric, no atrophy, no gross deformities   · Lower Extremities: Right foot wrapped in gauze  · Thorax: No paraspinal tenderness bilaterally. No scoliosis or kyphosis. · Lumbar Spine: ROM reduced in extension. Lumbar paraspinals tender to palpation bilaterally. SLR neg bilaterally. Positive facet loading bilaterally. - B/L Knees: TTP over medial and lateral joint lines. No appreciable fluid in knees. Ligamentous structures intact. Neurological: Cranial nerves II-XII grossly intact. · Gait - Antalgic gait. Ambulates with assistive device. · Motor: No focal motor deficits appreciated in lower limbs   Skin: No rashes or lesions present   Psychological: Cooperative, no exaggerated pain behaviors       Assessment:    Diagnosis Orders   1. Lumbar spondylosis        2. Other chronic pain  oxyCODONE-acetaminophen (PERCOCET) 5-325 MG per tablet      3. Lumbar facet arthropathy        4. Pathological compression fracture of lumbar vertebra, initial encounter ([facility] [address]        5. Primary osteoarthritis of both knees        6. Infection of toe as complication of amputation ([facility] [address]                Kat Breen is a 55 y. o.male presenting to the pain clinic for evaluation of chronic low back and knee

## 2022-09-19 DIAGNOSIS — G89.29 OTHER CHRONIC PAIN: ICD-10-CM

## 2022-09-19 NOTE — TELEPHONE ENCOUNTER
OARRS reviewed. UDS: negative   Last seen: 8/24/2022.  Follow-up:   Future Appointments   Date Time Provider Kaitlin Turciso   10/17/2022  1:00 PM DO ALBERTINA Bingham [facility] Pain [facility] [address] [facility]

## 2022-09-19 NOTE — TELEPHONE ENCOUNTER
Teddie Schirmer called requesting a refill on the following medications:  Requested Prescriptions     Pending Prescriptions Disp Refills    oxyCODONE-acetaminophen (PERCOCET) 5-325 MG per tablet 60 tablet 0     Sig: Take 1 tablet by mouth 2 times daily as needed for Pain for up to 30 days.      Pharmacy verified:  .pv  [address]    Date of last visit: 08/24/2022  Date of next visit (if applicable): 40/98/8519

## 2022-09-20 RX ORDER — OXYCODONE HYDROCHLORIDE AND ACETAMINOPHEN 5; 325 MG/1; MG/1
1 TABLET ORAL 2 TIMES DAILY PRN
Qty: 60 TABLET | Refills: 0 | Status: SHIPPED | OUTPATIENT
Start: 2022-09-23 | End: 2022-10-23

## 2022-10-17 ENCOUNTER — OFFICE VISIT (OUTPATIENT)
Dept: PHYSICAL MEDICINE AND REHAB | Age: 57
End: 2022-10-17
Payer: COMMERCIAL

## 2022-10-17 VITALS
BODY MASS INDEX: 40.69 KG/M2 | SYSTOLIC BLOOD PRESSURE: 150 MMHG | HEIGHT: 73 IN | DIASTOLIC BLOOD PRESSURE: 70 MMHG | WEIGHT: 307 LBS

## 2022-10-17 DIAGNOSIS — G89.29 CHRONIC PAIN OF RIGHT KNEE: Primary | ICD-10-CM

## 2022-10-17 DIAGNOSIS — M48.56XA PATHOLOGICAL COMPRESSION FRACTURE OF LUMBAR VERTEBRA, INITIAL ENCOUNTER (HCC): ICD-10-CM

## 2022-10-17 DIAGNOSIS — M25.561 CHRONIC PAIN OF RIGHT KNEE: Primary | ICD-10-CM

## 2022-10-17 DIAGNOSIS — M47.816 LUMBAR SPONDYLOSIS: ICD-10-CM

## 2022-10-17 DIAGNOSIS — G89.29 OTHER CHRONIC PAIN: ICD-10-CM

## 2022-10-17 PROCEDURE — G8417 CALC BMI ABV UP PARAM F/U: HCPCS | Performed by: ANESTHESIOLOGY

## 2022-10-17 PROCEDURE — G8427 DOCREV CUR MEDS BY ELIG CLIN: HCPCS | Performed by: ANESTHESIOLOGY

## 2022-10-17 PROCEDURE — 1036F TOBACCO NON-USER: CPT | Performed by: ANESTHESIOLOGY

## 2022-10-17 PROCEDURE — 3017F COLORECTAL CA SCREEN DOC REV: CPT | Performed by: ANESTHESIOLOGY

## 2022-10-17 PROCEDURE — G8484 FLU IMMUNIZE NO ADMIN: HCPCS | Performed by: ANESTHESIOLOGY

## 2022-10-17 PROCEDURE — 99214 OFFICE O/P EST MOD 30 MIN: CPT | Performed by: ANESTHESIOLOGY

## 2022-10-17 RX ORDER — HYDROCODONE BITARTRATE AND ACETAMINOPHEN 5; 325 MG/1; MG/1
1 TABLET ORAL 2 TIMES DAILY PRN
Qty: 60 TABLET | Refills: 0 | Status: SHIPPED | OUTPATIENT
Start: 2022-10-17 | End: 2022-11-16

## 2022-10-17 RX ORDER — HYDROCODONE BITARTRATE AND ACETAMINOPHEN 5; 325 MG/1; MG/1
1 TABLET ORAL 2 TIMES DAILY PRN
Qty: 60 TABLET | Refills: 0 | Status: SHIPPED | OUTPATIENT
Start: 2022-10-17 | End: 2022-10-17 | Stop reason: CLARIF

## 2022-10-17 NOTE — PROGRESS NOTES
Chronic Pain/PM&R Clinic Note     Encounter Date: 10/17/22    Subjective:   Chief Complaint:   Chief Complaint   Patient presents with    Follow-up     Bilateral knee pain, middle back pain. History of Present Illness:   Sharmila Addison is a 65 y.o. male seen in the clinic initially on 10/20/21 for his history of chronic knee pain. He has a medical history of anxiety/depression and T2DM is s/p multiple toe amputations. He has longstanding history of bilateral knee pain for the last several years. He endorses the majority of pain to be on the medial and lateral aspect of the knees at the joint line. He states that his knee pain is a constant 5/10 aching, stabbing pain. His knee pain is worse with any activities where he is trying to go up and down stairs and really has a difficult time being on his legs for an extended duration of time. He denies any further radiation down his legs. He does have some low back pain was found to have an L1 compression fracture several months ago and states that he did not undergo a kyphoplasty but is wondering how this can be addressed. He describes the majority of his back pain to be axial in nature slightly above his waistline. This pain is described as a constant aching, nagging, stabbing pain that he rates as a constant 6/10 pain. He denies any pain that radiates further down his legs, associated leg weakness, leg paresthesias, saddle anesthesia, bowel/bladder incontinence. Today, 10/17/2022, patient presents for planned follow-up for chronic low back and knee pain. He has recovered from his right toe amputations and has full weightbearing status on the right foot. He continues to struggle with both his back and knee pain. He states that he is actively looking for jobs right now and is looking for a job that requires a little bit more time sitting at a desk.   He feels like his knee is really struggling overall with his walking and states his right knee is worse than his left knee. He is wondering what he can do to help out with some of his knee pain. He states he had a previous right knee ablation in the past that was very effective would like to try to repeat an ablation for this. Past Medical History:   Diagnosis Date    Anxiety     Depression     Diabetes ([facility] [address]     Hyperlipidemia     Hypertension     Seizures ([facility] [address]        Past Surgical History:   Procedure Laterality Date    KNEE ARTHROSCOPY Bilateral     LEG SURGERY      PAIN MANAGEMENT PROCEDURE Bilateral 12/14/2021    medial branch blocks at bilateral L2/L3 and L3/L4 performed by Dave Torres DO at [facility] Bilateral 1/25/2022    Lumbar Facet MBB #2 L2-3,3-4 bilateral #2 performed by Dave Torres DO at [facility] Right 2/16/2022    radiofrequency ablations  L2/L3 and L3/L4 facet joint. right side first performed by Dave Torres DO at [facility] Left 4/6/2022    L-sided lumbar RFA at L2-3 and L3-4 performed by Dave Torres DO at [address] [place] [address] Bilateral     multiple toes amputation       Family History   Problem Relation Age of Onset    Hypertension Mother     Diabetes Mother        Medications & Allergies:   Current Outpatient Medications   Medication Instructions    aspirin 81 mg, Oral, DAILY    atorvastatin (LIPITOR) 20 mg, Oral, DAILY    baclofen (LIORESAL) 20 MG tablet No dose, route, or frequency recorded.     FLUoxetine (PROZAC) 10 mg, Oral, DAILY    glimepiride (AMARYL) 6 mg, Oral, EVERY MORNING    HYDROcodone-acetaminophen (NORCO) 5-325 MG per tablet 1 tablet, Oral, 2 TIMES DAILY PRN    insulin lispro (HUMALOG) 0-15 Units, SubCUTAneous, 3 TIMES DAILY BEFORE MEALS    lisinopril (PRINIVIL;ZESTRIL) 10 mg, Oral, DAILY    metFORMIN (GLUCOPHAGE) 500 mg, Oral, DAILY WITH BREAKFAST    oxyCODONE-acetaminophen (PERCOCET) 5-325 MG per tablet 1 tablet, Oral, 2 TIMES DAILY PRN    pregabalin (LYRICA) 75 mg, Oral, 3 TIMES DAILY    XARELTO 20 MG TABS tablet TAKE 1 TABLET BY MOUTH ONCE DAILY WITH DINNER       No Known Allergies    Review of Systems:   Constitutional: negative for weight changes or fevers  Genitourinary: negative for bowel/bladder incontinence   Musculoskeletal: positive for low back pain and bilateral knee pain  Neurological: negative for any leg weakness or numbness/tingling  Behavioral/Psych: negative for anxiety/depression   All other systems reviewed and are negative    Objective:     Vitals:    10/17/22 1304   BP: (!) 150/70     Constitutional: Pleasant, no acute distress   Head: Normocephalic, atraumatic   Eyes: Conjunctivae normal   Neck: Supple, symmetrical   Lungs: Normal respiratory effort, non-labored breathing   Cardiovascular: Limbs warm and well perfused   Abdomen: Non-protruded   Musculoskeletal: Muscle bulk symmetric, no atrophy, no gross deformities   · Lower Extremities: Right foot wrapped in gauze  · Thorax: No paraspinal tenderness bilaterally. No scoliosis or kyphosis. · Lumbar Spine: ROM reduced in extension. Lumbar paraspinals tender to palpation bilaterally. SLR neg bilaterally. Positive facet loading bilaterally. - B/L Knees: TTP over medial and lateral joint lines. No appreciable fluid in knees. Ligamentous structures intact. Neurological: Cranial nerves II-XII grossly intact. · Gait - Antalgic gait. Ambulates with assistive device. · Motor: No focal motor deficits appreciated in lower limbs   Skin: No rashes or lesions present   Psychological: Cooperative, no exaggerated pain behaviors       Assessment:    Diagnosis Orders   1.  Chronic pain of right knee  CHG FLUOR NEEDLE/CATH SPINE/PARASPINAL DX/THER ADDON    MD INJECTION AA&/STRD OTHER PERIPHERAL NERVE/BRANCH      2. Other chronic pain  HYDROcodone-acetaminophen (NORCO) 5-325 MG per tablet    DISCONTINUED: HYDROcodone-acetaminophen (NORCO) 5-325 MG per tablet      3. Lumbar spondylosis        4. Pathological compression fracture of lumbar vertebra, initial encounter ([facility] [address]                  Slade Graves is a 65 y.o.male presenting to the pain clinic for evaluation of chronic low back and knee pain. His clinical history and physical examination are consistent with lumbar facet mediated pain secondary to an L1 compression fracture. In addition, he has bilateral knee pain secondary advanced osteoarthritis. He responded significantly to his lumbar radiofrequency ablation unfortunately has had acute exacerbation of chronic low back pain secondary to complications from a right toe amputation due to nonhealing foot ulcer. For his ongoing right knee pain I set him up for right genicular nerve block under fluoroscopic guidance. I have resumed his Norco for breakthrough pain relief until we complete his ablation therapies in his knees and look into his back again. Plan: The following treatment recommendations and plan were discussed in detail with Slade Graves. Imaging:   I have reviewed patients imaging of MRI L-spine. Analgesics:  Due to acute exacerbation of chronic low back pain due to his right toe amputations and change in gait, I have continued the patient on low-dose Norco 5 mg to take up to twice a day for severe pain (pain greater than 7/10). We will utilize this until we can complete better injection therapy for his knee and back pain. Patient is advised take this medication as prescribed otherwise taking more than prescribed can lead development tolerance, physical dependence, addiction. OARRS reviewed and is appropriate. Pain contract signed. Interventions: For his continued chronic right knee pain secondary to osteoarthritis, I set the patient up for right genicular nerve block under fluoroscopic guidance. The risk of the procedure discussed in detail the patient.   Patient was proceed with the injection. Anticoagulation/NPO Recommendations:   Patient does not need to hold any medications prior to the procedure. Patient will need to be NPO x 8 hours prior to the procedure. Plan to start an IV prior to the procedure. Multidisciplinary Pain Management:   In the presence of complex, chronic, and multi-factorial pain, the importance of a multidisciplinary approach to pain management in the patients management regimen was emphasized and discussed in great detail.      Referrals:  None    Prescriptions Written This Visit:   Norco 5 mg (#60, 0 refills)    Follow-up: For R genicular block      Marty Worrell, DO  Interventional Pain Management/PM&R   [place]

## 2022-11-09 ENCOUNTER — PREP FOR PROCEDURE (OUTPATIENT)
Dept: PHYSICAL MEDICINE AND REHAB | Age: 57
End: 2022-11-09

## 2022-11-15 ENCOUNTER — TELEPHONE (OUTPATIENT)
Dept: PHYSICAL MEDICINE AND REHAB | Age: 57
End: 2022-11-15

## 2022-11-15 DIAGNOSIS — G89.29 OTHER CHRONIC PAIN: ICD-10-CM

## 2022-11-15 RX ORDER — HYDROCODONE BITARTRATE AND ACETAMINOPHEN 5; 325 MG/1; MG/1
1 TABLET ORAL 2 TIMES DAILY PRN
Qty: 60 TABLET | Refills: 0 | OUTPATIENT
Start: 2022-11-16 | End: 2022-12-16

## 2022-11-15 NOTE — TELEPHONE ENCOUNTER
Eben Chandler called requesting a refill on the following medications:  Requested Prescriptions     Pending Prescriptions Disp Refills    HYDROcodone-acetaminophen (NORCO) 5-325 MG per tablet 60 tablet 0     Sig: Take 1 tablet by mouth 2 times daily as needed for Pain for up to 30 days.      Pharmacy verified:  [facility]      Date of last visit: 10-17-22  Date of next visit (if applicable): 51/53/4033

## 2022-11-15 NOTE — TELEPHONE ENCOUNTER
Pt. GONSALOM that he wanted to cancel tomorrow's procedure due to increased back pain, have a follow up to discuss back procedures and have that taken care and then continue with the Genicular nerve block. Spoke with Dr. Lary Costa about this. Advised that pt. Should continue with this procedure and then can continue with back procedures. Pt. Ute Laws and informed of Dr. Yuko Holloway recommendations. States that he does not want the genicular block because he thinks he has a torn meniscus in his other knee and his back pain is so bad he doesn't think he can lay on the table. He then stated that he had to go and cancel the procedure and follow up and he will call back to reschedule.

## 2022-11-15 NOTE — TELEPHONE ENCOUNTER
OARRS reviewed. UDS: negative   Last seen: 10/17/2022.  Follow-up:   Future Appointments   Date Time Provider Kaitlin Turcios   12/14/2022  1:00 PM DO ALBERTINA Parra [facility] - [facility]

## 2022-11-23 ENCOUNTER — OFFICE VISIT (OUTPATIENT)
Dept: PHYSICAL MEDICINE AND REHAB | Age: 57
End: 2022-11-23
Payer: COMMERCIAL

## 2022-11-23 VITALS
WEIGHT: 307 LBS | SYSTOLIC BLOOD PRESSURE: 140 MMHG | HEIGHT: 73 IN | BODY MASS INDEX: 40.69 KG/M2 | DIASTOLIC BLOOD PRESSURE: 92 MMHG

## 2022-11-23 DIAGNOSIS — G89.29 CHRONIC PAIN OF RIGHT KNEE: Primary | ICD-10-CM

## 2022-11-23 DIAGNOSIS — G89.29 OTHER CHRONIC PAIN: ICD-10-CM

## 2022-11-23 DIAGNOSIS — M47.816 LUMBAR SPONDYLOSIS: ICD-10-CM

## 2022-11-23 DIAGNOSIS — M48.56XA PATHOLOGICAL COMPRESSION FRACTURE OF LUMBAR VERTEBRA, INITIAL ENCOUNTER (HCC): ICD-10-CM

## 2022-11-23 DIAGNOSIS — M25.561 CHRONIC PAIN OF RIGHT KNEE: Primary | ICD-10-CM

## 2022-11-23 PROCEDURE — 3017F COLORECTAL CA SCREEN DOC REV: CPT | Performed by: ANESTHESIOLOGY

## 2022-11-23 PROCEDURE — 1036F TOBACCO NON-USER: CPT | Performed by: ANESTHESIOLOGY

## 2022-11-23 PROCEDURE — G8427 DOCREV CUR MEDS BY ELIG CLIN: HCPCS | Performed by: ANESTHESIOLOGY

## 2022-11-23 PROCEDURE — G8417 CALC BMI ABV UP PARAM F/U: HCPCS | Performed by: ANESTHESIOLOGY

## 2022-11-23 PROCEDURE — 3078F DIAST BP <80 MM HG: CPT | Performed by: ANESTHESIOLOGY

## 2022-11-23 PROCEDURE — G8484 FLU IMMUNIZE NO ADMIN: HCPCS | Performed by: ANESTHESIOLOGY

## 2022-11-23 PROCEDURE — 99214 OFFICE O/P EST MOD 30 MIN: CPT | Performed by: ANESTHESIOLOGY

## 2022-11-23 PROCEDURE — 3074F SYST BP LT 130 MM HG: CPT | Performed by: ANESTHESIOLOGY

## 2022-11-23 RX ORDER — HYDROCODONE BITARTRATE AND ACETAMINOPHEN 5; 325 MG/1; MG/1
1 TABLET ORAL 2 TIMES DAILY PRN
Qty: 60 TABLET | Refills: 0 | Status: SHIPPED | OUTPATIENT
Start: 2022-11-23 | End: 2022-12-23

## 2022-11-23 NOTE — PROGRESS NOTES
Chronic Pain/PM&R Clinic Note     Encounter Date: 11/23/22    Subjective:   Chief Complaint:   Chief Complaint   Patient presents with    Follow-up     Keshawn knee and back pain        History of Present Illness:   Kelvin Calero is a 65 y.o. male seen in the clinic initially on 10/20/21 for his history of chronic knee pain. He has a medical history of anxiety/depression and T2DM is s/p multiple toe amputations. He has longstanding history of bilateral knee pain for the last several years. He endorses the majority of pain to be on the medial and lateral aspect of the knees at the joint line. He states that his knee pain is a constant 5/10 aching, stabbing pain. His knee pain is worse with any activities where he is trying to go up and down stairs and really has a difficult time being on his legs for an extended duration of time. He denies any further radiation down his legs. He does have some low back pain was found to have an L1 compression fracture several months ago and states that he did not undergo a kyphoplasty but is wondering how this can be addressed. He describes the majority of his back pain to be axial in nature slightly above his waistline. This pain is described as a constant aching, nagging, stabbing pain that he rates as a constant 6/10 pain. He denies any pain that radiates further down his legs, associated leg weakness, leg paresthesias, saddle anesthesia, bowel/bladder incontinence. Today, 11/23/2022, patient presents for planned follow-up for chronic low back and knee pain. He has recovered from his right toe amputations and has full weightbearing status on the right foot. He continues to struggle with both his back and knee pain. He states that he is actively looking for jobs right now and is looking for a job that requires a little bit more time sitting at a desk.   He feels like his knee is really struggling overall with his walking and states his right knee is worse than his left knee. He is wondering what he can do to help out with some of his knee pain. He states he had a previous right knee ablation in the past that was very effective would like to try to repeat an ablation for this. Past Medical History:   Diagnosis Date    Anxiety     Depression     Diabetes ([facility] [address]     Hyperlipidemia     Hypertension     Seizures ([facility] [address]        Past Surgical History:   Procedure Laterality Date    KNEE ARTHROSCOPY Bilateral     LEG SURGERY      PAIN MANAGEMENT PROCEDURE Bilateral 12/14/2021    medial branch blocks at bilateral L2/L3 and L3/L4 performed by Schuyler Sever, DO at [facility] Bilateral 1/25/2022    Lumbar Facet MBB #2 L2-3,3-4 bilateral #2 performed by Schuyler Sever, DO at [facility] Right 2/16/2022    radiofrequency ablations  L2/L3 and L3/L4 facet joint. right side first performed by Schuyler Sever, DO at [facility] Left 4/6/2022    L-sided lumbar RFA at L2-3 and L3-4 performed by Schuyler Sever, DO at [address] [place] [address] Bilateral     multiple toes amputation       Family History   Problem Relation Age of Onset    Hypertension Mother     Diabetes Mother        Medications & Allergies:   Current Outpatient Medications   Medication Instructions    aspirin 81 mg, Oral, DAILY    atorvastatin (LIPITOR) 20 mg, Oral, DAILY    baclofen (LIORESAL) 20 MG tablet No dose, route, or frequency recorded.     FLUoxetine (PROZAC) 10 mg, Oral, DAILY    glimepiride (AMARYL) 6 mg, Oral, EVERY MORNING    HYDROcodone-acetaminophen (NORCO) 5-325 MG per tablet 1 tablet, Oral, 2 TIMES DAILY PRN    insulin lispro (HUMALOG) 0-15 Units, SubCUTAneous, 3 TIMES DAILY BEFORE MEALS    lisinopril (PRINIVIL;ZESTRIL) 10 mg, Oral, DAILY    metFORMIN (GLUCOPHAGE) 500 mg, Oral, DAILY WITH BREAKFAST    pregabalin (LYRICA) 75 mg, Oral, 3 TIMES DAILY    XARELTO 20 MG TABS tablet TAKE 1 TABLET BY MOUTH ONCE DAILY WITH DINNER       No Known Allergies    Review of Systems:   Constitutional: negative for weight changes or fevers  Genitourinary: negative for bowel/bladder incontinence   Musculoskeletal: positive for low back pain and bilateral knee pain  Neurological: negative for any leg weakness or numbness/tingling  Behavioral/Psych: negative for anxiety/depression   All other systems reviewed and are negative    Objective:     Vitals:    11/23/22 1446   BP: (!) 140/92     Constitutional: Pleasant, no acute distress   Head: Normocephalic, atraumatic   Eyes: Conjunctivae normal   Neck: Supple, symmetrical   Lungs: Normal respiratory effort, non-labored breathing   Cardiovascular: Limbs warm and well perfused   Abdomen: Non-protruded   Musculoskeletal: Muscle bulk symmetric, no atrophy, no gross deformities   · Lower Extremities: Right foot wrapped in gauze  · Thorax: No paraspinal tenderness bilaterally. No scoliosis or kyphosis. · Lumbar Spine: ROM reduced in extension. Lumbar paraspinals tender to palpation bilaterally. SLR neg bilaterally. Positive facet loading bilaterally. - B/L Knees: TTP over medial and lateral joint lines. No appreciable fluid in knees. Ligamentous structures intact. Neurological: Cranial nerves II-XII grossly intact. · Gait - Antalgic gait. Ambulates with assistive device. · Motor: No focal motor deficits appreciated in lower limbs   Skin: No rashes or lesions present   Psychological: Cooperative, no exaggerated pain behaviors       Assessment:    Diagnosis Orders   1. Chronic pain of right knee        2. Lumbar spondylosis  CHG FLUOR NEEDLE/CATH SPINE/PARASPINAL DX/THER ADDON    UT INJ DX/THER AGNT PARAVERT FACET JOINT, LUMBAR/SAC, 1ST LEVEL    UT INJ DX/THER AGNT PARAVERT FACET JOINT, LUMBAR/SAC, 2ND LEVEL      3. Other chronic pain  HYDROcodone-acetaminophen (NORCO) 5-325 MG per tablet      4.  Pathological compression fracture of lumbar vertebra, initial encounter ([facility] [address]                    Shoaib Lopez is a 65 y.o.male presenting to the pain clinic for evaluation of chronic low back and knee pain. His clinical history and physical examination are consistent with lumbar facet mediated pain secondary to an L1 compression fracture. In addition, he has bilateral knee pain secondary advanced osteoarthritis. He responded significantly to his lumbar radiofrequency ablation unfortunately has had acute exacerbation of chronic low back pain secondary to complications from a right toe amputation due to nonhealing foot ulcer. For his ongoing right knee pain I set him up for right genicular nerve block under fluoroscopic guidance. I have resumed his Norco for breakthrough pain relief until we complete his ablation therapies in his knees and look into his back again. Plan: The following treatment recommendations and plan were discussed in detail with Shoaib Lopez. Imaging:   I have reviewed patients imaging of MRI L-spine. Analgesics:  Due to acute exacerbation of chronic low back pain due to his right toe amputations and change in gait, I have continued the patient on low-dose Norco 5 mg to take up to twice a day for severe pain (pain greater than 7/10). We will utilize this until we can complete better injection therapy for his knee and back pain. Patient is advised take this medication as prescribed otherwise taking more than prescribed can lead development tolerance, physical dependence, addiction. OARRS reviewed and is appropriate. Pain contract signed. Interventions: For his continued chronic right knee pain secondary to osteoarthritis, I set the patient up for right genicular nerve block under fluoroscopic guidance. The risk of the procedure discussed in detail the patient. Patient was proceed with the injection.      Anticoagulation/NPO Recommendations:   Patient does not need to hold any medications prior to the procedure. Patient will need to be NPO x 8 hours prior to the procedure. Plan to start an IV prior to the procedure. Multidisciplinary Pain Management:   In the presence of complex, chronic, and multi-factorial pain, the importance of a multidisciplinary approach to pain management in the patients management regimen was emphasized and discussed in great detail.      Referrals:  None    Prescriptions Written This Visit:   Norco 5 mg (#60, 0 refills)    Follow-up: For R genicular block      Marty Worrell, DO  Interventional Pain Management/PM&R   [place]

## 2022-11-30 ENCOUNTER — TELEPHONE (OUTPATIENT)
Dept: PHYSICAL MEDICINE AND REHAB | Age: 57
End: 2022-11-30

## 2022-11-30 NOTE — TELEPHONE ENCOUNTER
Pt. Called the office. Requests a Lt. Knee MRI. Informed  Dr. Kamaljit Field and he will discuss at follow up appointment from procedure. He would like the procedure changed to the Left knee. Please advise. Will he need to come into the office for an appointment to discuss?  Thanks

## 2022-12-15 ENCOUNTER — PREP FOR PROCEDURE (OUTPATIENT)
Dept: PHYSICAL MEDICINE AND REHAB | Age: 57
End: 2022-12-15

## 2022-12-19 NOTE — DISCHARGE INSTRUCTIONS

## 2022-12-21 NOTE — H&P
Today, patient presents for planned right genicular nerve block under fluoroscopic guidance. This note is reflective of the patient's previous visit for evaluation. We will proceed with today's planned procedure. Since patient's last visit for evaluation, there have been no interval changes in medical history. Patient has no new numbness, weakness, or focal neurological deficit since evaluation. Patient has no contraindications to injection (no anticoagulation or recent antibiotic intake for active infections), and has a  present or is able to drive themselves (as discussed and cleared by physician). Allergies to latex, contrast dye, and steroid medications have been confirmed with the patient prior to the procedure. NPO necessity has been assessed and accepted based on procedure complexity. The risks and benefits of the procedure have been explained including but are not limited to infection, bleeding, paralysis, immediate post procedure weakness, and dizziness; the patient acknowledges understanding and desires to proceed with the procedure. Patient has signed consent for same procedure as discussed in previous clinic encounter. All other questions and concerns were addressed at bedside. See procedure note for full details. Post procedure Instructions: The patient was advised not to drive during the day of the procedure and not to engage in any significant decision making (unless otherwise states by physician). The patient was also advised to be cautious with walking/activity for 24 hours following today's visit and asked not to engage in over-exertion (unless otherwise states by physician). After this time, it is ok to resume pre-procedure level of activity. Patient advised to apply ice to site of injection in situations of pain and discomfort. Patient advised to not submerge site of injection during bath or pool activities for approximately 24 hours post-procedure.  Patient attested to understanding post procedure directions / restrictions. All other questions and concerns addressed before patient discharge in ambulatory fashion. Chronic Pain/PM&R Clinic Note     Encounter Date: 11/23/22    Subjective:   Chief Complaint:   No chief complaint on file. History of Present Illness:   Celeste Torres is a 65 y.o. male seen in the clinic initially on 10/20/21 for his history of chronic knee pain. He has a medical history of anxiety/depression and T2DM is s/p multiple toe amputations. He has longstanding history of bilateral knee pain for the last several years. He endorses the majority of pain to be on the medial and lateral aspect of the knees at the joint line. He states that his knee pain is a constant 5/10 aching, stabbing pain. His knee pain is worse with any activities where he is trying to go up and down stairs and really has a difficult time being on his legs for an extended duration of time. He denies any further radiation down his legs. He does have some low back pain was found to have an L1 compression fracture several months ago and states that he did not undergo a kyphoplasty but is wondering how this can be addressed. He describes the majority of his back pain to be axial in nature slightly above his waistline. This pain is described as a constant aching, nagging, stabbing pain that he rates as a constant 6/10 pain. He denies any pain that radiates further down his legs, associated leg weakness, leg paresthesias, saddle anesthesia, bowel/bladder incontinence. Today, 11/23/2022, patient presents for planned follow-up for chronic low back and knee pain. He has recovered from his right toe amputations and has full weightbearing status on the right foot. He continues to struggle with both his back and knee pain. He states that he is actively looking for jobs right now and is looking for a job that requires a little bit more time sitting at a desk.   He feels like his knee is really struggling overall with his walking and states his right knee is worse than his left knee. He is wondering what he can do to help out with some of his knee pain. He states he had a previous right knee ablation in the past that was very effective would like to try to repeat an ablation for this. Past Medical History:   Diagnosis Date    Anxiety     Depression     Diabetes ([facility] [address]     Hyperlipidemia     Hypertension     Seizures ([facility] [address]        Past Surgical History:   Procedure Laterality Date    KNEE ARTHROSCOPY Bilateral     LEG SURGERY      PAIN MANAGEMENT PROCEDURE Bilateral 12/14/2021    medial branch blocks at bilateral L2/L3 and L3/L4 performed by Samm , DO at [facility] Bilateral 1/25/2022    Lumbar Facet MBB #2 L2-3,3-4 bilateral #2 performed by Samm , DO at [facility] Right 2/16/2022    radiofrequency ablations  L2/L3 and L3/L4 facet joint. right side first performed by Samm , DO at [facility] Left 4/6/2022    L-sided lumbar RFA at L2-3 and L3-4 performed by Samm , DO at [address] [place] [address] Bilateral     multiple toes amputation       Family History   Problem Relation Age of Onset    Hypertension Mother     Diabetes Mother        Medications & Allergies:   Current Outpatient Medications   Medication Instructions    aspirin 81 mg, Oral, DAILY    atorvastatin (LIPITOR) 20 mg, Oral, DAILY    baclofen (LIORESAL) 20 MG tablet No dose, route, or frequency recorded.     FLUoxetine (PROZAC) 10 mg, Oral, DAILY    glimepiride (AMARYL) 6 mg, Oral, EVERY MORNING    HYDROcodone-acetaminophen (NORCO) 5-325 MG per tablet 1 tablet, Oral, 2 TIMES DAILY PRN    insulin lispro (HUMALOG) 0-15 Units, SubCUTAneous, 3 TIMES DAILY BEFORE MEALS    lisinopril (PRINIVIL;ZESTRIL) 10 mg, Oral, DAILY    metFORMIN (GLUCOPHAGE) 500 mg, Oral, DAILY WITH BREAKFAST    pregabalin (LYRICA) 75 mg, Oral, 3 TIMES DAILY    XARELTO 20 MG TABS tablet TAKE 1 TABLET BY MOUTH ONCE DAILY WITH DINNER       No Known Allergies    Review of Systems:   Constitutional: negative for weight changes or fevers  Genitourinary: negative for bowel/bladder incontinence   Musculoskeletal: positive for low back pain and bilateral knee pain  Neurological: negative for any leg weakness or numbness/tingling  Behavioral/Psych: negative for anxiety/depression   All other systems reviewed and are negative    Objective: There were no vitals filed for this visit. Constitutional: Pleasant, no acute distress   Head: Normocephalic, atraumatic   Eyes: Conjunctivae normal   Neck: Supple, symmetrical   Lungs: Normal respiratory effort, non-labored breathing   Cardiovascular: Limbs warm and well perfused   Abdomen: Non-protruded   Musculoskeletal: Muscle bulk symmetric, no atrophy, no gross deformities   · Lower Extremities: Right foot wrapped in gauze  · Thorax: No paraspinal tenderness bilaterally. No scoliosis or kyphosis. · Lumbar Spine: ROM reduced in extension. Lumbar paraspinals tender to palpation bilaterally. SLR neg bilaterally. Positive facet loading bilaterally. - B/L Knees: TTP over medial and lateral joint lines. No appreciable fluid in knees. Ligamentous structures intact. Neurological: Cranial nerves II-XII grossly intact. · Gait - Antalgic gait. Ambulates with assistive device. · Motor: No focal motor deficits appreciated in lower limbs   Skin: No rashes or lesions present   Psychological: Cooperative, no exaggerated pain behaviors       Assessment:    Diagnosis Orders   1. Chronic pain of right knee        2. Lumbar spondylosis  CHG FLUOR NEEDLE/CATH SPINE/PARASPINAL DX/THER ADDON    ND INJ DX/THER AGNT PARAVERT FACET JOINT, LUMBAR/SAC, 1ST LEVEL    ND INJ DX/THER AGNT PARAVERT FACET JOINT, LUMBAR/SAC, 2ND LEVEL      3.  Other chronic pain HYDROcodone-acetaminophen (NORCO) 5-325 MG per tablet      4. Pathological compression fracture of lumbar vertebra, initial encounter ([facility] [address]                    Kelvin Calero is a 65 y.o.male presenting to the pain clinic for evaluation of chronic low back and knee pain. His clinical history and physical examination are consistent with lumbar facet mediated pain secondary to an L1 compression fracture. In addition, he has bilateral knee pain secondary advanced osteoarthritis. He responded significantly to his lumbar radiofrequency ablation unfortunately has had acute exacerbation of chronic low back pain secondary to complications from a right toe amputation due to nonhealing foot ulcer. For his ongoing right knee pain I set him up for right genicular nerve block under fluoroscopic guidance. I have resumed his Norco for breakthrough pain relief until we complete his ablation therapies in his knees and look into his back again. Plan: The following treatment recommendations and plan were discussed in detail with Kelvin Calero. Imaging:   I have reviewed patients imaging of MRI L-spine. Analgesics:  Due to acute exacerbation of chronic low back pain due to his right toe amputations and change in gait, I have continued the patient on low-dose Norco 5 mg to take up to twice a day for severe pain (pain greater than 7/10). We will utilize this until we can complete better injection therapy for his knee and back pain. Patient is advised take this medication as prescribed otherwise taking more than prescribed can lead development tolerance, physical dependence, addiction. OARRS reviewed and is appropriate. Pain contract signed. Interventions: For his continued chronic right knee pain secondary to osteoarthritis, I set the patient up for right genicular nerve block under fluoroscopic guidance. The risk of the procedure discussed in detail the patient.   Patient was proceed with the injection. Anticoagulation/NPO Recommendations:   Patient does not need to hold any medications prior to the procedure. Patient will need to be NPO x 8 hours prior to the procedure. Plan to start an IV prior to the procedure. Multidisciplinary Pain Management:   In the presence of complex, chronic, and multi-factorial pain, the importance of a multidisciplinary approach to pain management in the patients management regimen was emphasized and discussed in great detail.      Referrals:  None    Prescriptions Written This Visit:   Norco 5 mg (#60, 0 refills)    Follow-up: For R genicular block      Marty Worrell, DO  Interventional Pain Management/PM&R   [place]

## 2022-12-22 ENCOUNTER — HOSPITAL ENCOUNTER (OUTPATIENT)
Age: 57
Setting detail: OUTPATIENT SURGERY
Discharge: HOME | End: 2022-12-22
Attending: ANESTHESIOLOGY | Admitting: ANESTHESIOLOGY
Payer: MEDICARE

## 2022-12-22 ENCOUNTER — TELEPHONE (OUTPATIENT)
Dept: PHYSICAL MEDICINE AND REHAB | Age: 57
End: 2022-12-22

## 2022-12-22 VITALS
DIASTOLIC BLOOD PRESSURE: 70 MMHG | BODY MASS INDEX: 39.47 KG/M2 | OXYGEN SATURATION: 94 % | HEART RATE: 91 BPM | HEIGHT: 73 IN | WEIGHT: 297.8 LBS | RESPIRATION RATE: 16 BRPM | TEMPERATURE: 96.5 F | SYSTOLIC BLOOD PRESSURE: 150 MMHG

## 2022-12-22 DIAGNOSIS — G89.29 OTHER CHRONIC PAIN: ICD-10-CM

## 2022-12-22 LAB — GLUCOSE BLD-MCNC: 476 MG/DL (ref 70–108)

## 2022-12-22 PROCEDURE — 82948 REAGENT STRIP/BLOOD GLUCOSE: CPT

## 2022-12-22 RX ORDER — HYDROCODONE BITARTRATE AND ACETAMINOPHEN 5; 325 MG/1; MG/1
1 TABLET ORAL 2 TIMES DAILY PRN
Qty: 60 TABLET | Refills: 0 | Status: SHIPPED | OUTPATIENT
Start: 2022-12-23 | End: 2023-01-22

## 2022-12-22 ASSESSMENT — PAIN - FUNCTIONAL ASSESSMENT: PAIN_FUNCTIONAL_ASSESSMENT: 0-10

## 2022-12-22 NOTE — TELEPHONE ENCOUNTER
Brandee Barker called requesting a refill on the following medications:  Requested Prescriptions     Pending Prescriptions Disp Refills    HYDROcodone-acetaminophen (NORCO) 5-325 MG per tablet 60 tablet 0     Sig: Take 1 tablet by mouth 2 times daily as needed for Pain for up to 30 days. Pharmacy verified:[facility]   . pv      Date of last visit:   Date of next visit (if applicable): 2/31/5151

## 2022-12-22 NOTE — TELEPHONE ENCOUNTER
Pt. Came over from the surgery center. States that Dr. Raulito Norman will not do procedure due to chem strip >500. Advised that I would have to talk to MD before I could reschedule. Walked him back over to the [facility] and was told by staff that Dr. Raulito Norman told him to go to the ED for evaluation. Pt. Informed. He said he is not going to the ED. I strongly advised him to go for evaluation. Again he declined. How do you want to proceed?  Follow up kept

## 2022-12-22 NOTE — PROGRESS NOTES
Blood sugar 504. Repeated blood sugar 476. Dr. Laina Chen updated. Will talk to patient  0820-Dr. Laina Chen to room. Discussed with patient we are unable to do procedure. Encouraged patient to go to ED to get evaluated for blood sugar. IV removed with no complications. Patient getting dressed and will discharge.

## 2022-12-22 NOTE — TELEPHONE ENCOUNTER
OARRS reviewed. UDS: + for  NO RECENT UDS AVAILABLE. Savanah Siddiqui Last seen: 11/23/2022.  Follow-up:   Future Appointments   Date Time Provider Kaitlin Turcios   1/19/2023 11:00 AM AVRIL Byrd - CNP [facility] [address]

## 2023-01-17 ENCOUNTER — OFFICE VISIT (OUTPATIENT)
Dept: PHYSICAL MEDICINE AND REHAB | Age: 58
End: 2023-01-17
Payer: MEDICARE

## 2023-01-17 VITALS
WEIGHT: 297 LBS | SYSTOLIC BLOOD PRESSURE: 122 MMHG | DIASTOLIC BLOOD PRESSURE: 88 MMHG | BODY MASS INDEX: 39.36 KG/M2 | HEIGHT: 73 IN

## 2023-01-17 DIAGNOSIS — M25.561 CHRONIC PAIN OF RIGHT KNEE: Primary | ICD-10-CM

## 2023-01-17 DIAGNOSIS — Z86.39 HISTORY OF DIABETIC NEUROPATHY: ICD-10-CM

## 2023-01-17 DIAGNOSIS — G89.29 OTHER CHRONIC PAIN: ICD-10-CM

## 2023-01-17 DIAGNOSIS — G89.29 CHRONIC PAIN OF RIGHT KNEE: Primary | ICD-10-CM

## 2023-01-17 DIAGNOSIS — M17.0 PRIMARY OSTEOARTHRITIS OF BOTH KNEES: ICD-10-CM

## 2023-01-17 PROCEDURE — 3017F COLORECTAL CA SCREEN DOC REV: CPT | Performed by: NURSE PRACTITIONER

## 2023-01-17 PROCEDURE — G8417 CALC BMI ABV UP PARAM F/U: HCPCS | Performed by: NURSE PRACTITIONER

## 2023-01-17 PROCEDURE — 3079F DIAST BP 80-89 MM HG: CPT | Performed by: NURSE PRACTITIONER

## 2023-01-17 PROCEDURE — G8427 DOCREV CUR MEDS BY ELIG CLIN: HCPCS | Performed by: NURSE PRACTITIONER

## 2023-01-17 PROCEDURE — 99213 OFFICE O/P EST LOW 20 MIN: CPT | Performed by: NURSE PRACTITIONER

## 2023-01-17 PROCEDURE — 3074F SYST BP LT 130 MM HG: CPT | Performed by: NURSE PRACTITIONER

## 2023-01-17 PROCEDURE — 1036F TOBACCO NON-USER: CPT | Performed by: NURSE PRACTITIONER

## 2023-01-17 PROCEDURE — G8484 FLU IMMUNIZE NO ADMIN: HCPCS | Performed by: NURSE PRACTITIONER

## 2023-01-17 RX ORDER — HYDROCODONE BITARTRATE AND ACETAMINOPHEN 5; 325 MG/1; MG/1
1 TABLET ORAL 2 TIMES DAILY PRN
Qty: 60 TABLET | Refills: 0 | Status: SHIPPED | OUTPATIENT
Start: 2023-01-22 | End: 2023-02-21

## 2023-01-17 NOTE — PROGRESS NOTES
Chronic Pain/PM&R Clinic Note     Encounter Date: 1/17/23    Subjective:   Chief Complaint:   Chief Complaint   Patient presents with    Follow-up     Keshawn knee pain     Medication Refill     Norco        History of Present Illness:   Oscar Mcdonough is a 65 y.o. male seen in the clinic initially on 10/20/21 for his history of chronic knee pain. He has a medical history of anxiety/depression and T2DM is s/p multiple toe amputations. He has longstanding history of bilateral knee pain for the last several years. He endorses the majority of pain to be on the medial and lateral aspect of the knees at the joint line. He states that his knee pain is a constant 5/10 aching, stabbing pain. His knee pain is worse with any activities where he is trying to go up and down stairs and really has a difficult time being on his legs for an extended duration of time. He denies any further radiation down his legs. He does have some low back pain was found to have an L1 compression fracture several months ago and states that he did not undergo a kyphoplasty but is wondering how this can be addressed. He describes the majority of his back pain to be axial in nature slightly above his waistline. This pain is described as a constant aching, nagging, stabbing pain that he rates as a constant 6/10 pain. He denies any pain that radiates further down his legs, associated leg weakness, leg paresthesias, saddle anesthesia, bowel/bladder incontinence. Today, 1/17/2023, patient presents for planned follow-up on chronic low back and bilateral knee pain. He was scheduled to have the right genicular nerve block on 12/22/2022. His blood sugar was around 500 at the time of the procedure and therefore it was canceled. He states he did not take any of his medications for 5 days before the procedure, which is why his blood sugar was elevated. He states he has been taking his medications consistently.   His blood sugar this morning was 167 which is about average for him. He states sometimes in the afternoon it will get him just over 200. 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 He states he has been doing a lot of walking and standing as well, which is led to increased pain. He is not sure if he will be able to maintain this job due to the pain. He would like to move forward with the right knee genicular nerve block. He denies any new symptoms at this time. Past Medical History:   Diagnosis Date    Anxiety     Depression     Diabetes ([facility] [address]     Hyperlipidemia     Hypertension     Seizures ([facility] [address]        Past Surgical History:   Procedure Laterality Date    KNEE ARTHROSCOPY Bilateral     LEG SURGERY      PAIN MANAGEMENT PROCEDURE Bilateral 12/14/2021    medial branch blocks at bilateral L2/L3 and L3/L4 performed by Lizandro Andrade DO at [facility] Bilateral 1/25/2022    Lumbar Facet MBB #2 L2-3,3-4 bilateral #2 performed by Lizandro Andrade DO at [facility] Right 2/16/2022    radiofrequency ablations  L2/L3 and L3/L4 facet joint. right side first performed by Lizandro Andrade DO at [facility] Left 4/6/2022    L-sided lumbar RFA at L2-3 and L3-4 performed by Lizandro Andrade DO at [address] [place] [address] Bilateral     multiple toes amputation       Family History   Problem Relation Age of Onset    Hypertension Mother     Diabetes Mother        Medications & Allergies:   Current Outpatient Medications   Medication Instructions    aspirin 81 mg, Oral, DAILY    atorvastatin (LIPITOR) 20 mg, Oral, DAILY    baclofen (LIORESAL) 20 MG tablet No dose, route, or frequency recorded.     FLUoxetine (PROZAC) 10 mg, Oral, DAILY    glimepiride (AMARYL) 6 mg, Oral, EVERY MORNING    HYDROcodone-acetaminophen (NORCO) 5-325 MG per tablet 1 tablet, Oral, 2 TIMES DAILY PRN    insulin lispro (HUMALOG) 0-15 Units, SubCUTAneous, 3 TIMES DAILY BEFORE MEALS    lisinopril (PRINIVIL;ZESTRIL) 10 mg, Oral, DAILY    metFORMIN (GLUCOPHAGE) 500 mg, Oral, DAILY WITH BREAKFAST    pregabalin (LYRICA) 75 mg, Oral, 3 TIMES DAILY    XARELTO 20 MG TABS tablet TAKE 1 TABLET BY MOUTH ONCE DAILY WITH DINNER       No Known Allergies    Review of Systems:   Constitutional: negative for weight changes or fevers  Genitourinary: negative for bowel/bladder incontinence   Musculoskeletal: positive for low back pain and bilateral knee pain  Neurological: negative for any leg weakness or numbness/tingling  Behavioral/Psych: negative for anxiety/depression   All other systems reviewed and are negative    Objective:     Vitals:    01/17/23 1101   BP: 122/88     Constitutional: Pleasant, no acute distress   Head: Normocephalic, atraumatic   Eyes: Conjunctivae normal   Neck: Supple, symmetrical   Lungs: Normal respiratory effort, non-labored breathing   Cardiovascular: Limbs warm and well perfused   Abdomen: Non-protruded   Musculoskeletal: Muscle bulk symmetric, no atrophy, no gross deformities   · Lower Extremities: Right foot wrapped in gauze  · Thorax: No paraspinal tenderness bilaterally. No scoliosis or kyphosis. · Lumbar Spine: ROM reduced in extension. Lumbar paraspinals tender to palpation bilaterally. SLR neg bilaterally. Positive facet loading bilaterally. - B/L Knees: TTP over medial and lateral joint lines. No appreciable fluid in knees. Ligamentous structures intact. Neurological: Cranial nerves II-XII grossly intact. · Gait - Antalgic gait. Ambulates with assistive device. · Motor: No focal motor deficits appreciated in lower limbs   Skin: No rashes or lesions present   Psychological: Cooperative, no exaggerated pain behaviors       Assessment:    Diagnosis Orders   1. Chronic pain of right knee        2. Primary osteoarthritis of both knees        3. History of diabetic neuropathy        4.  Other chronic pain          Slava Kiarra Deloris Scott is a 65 y.o.male presenting to the pain clinic for evaluation of chronic low back and knee pain. His clinical history and physical examination are consistent with lumbar facet mediated pain secondary to an L1 compression fracture. In addition, he has bilateral knee pain secondary advanced osteoarthritis. We had a discussion about not stopping his medications. He denies any open wounds on his feet or in general. For his ongoing right knee pain I set him up for right genicular nerve block under fluoroscopic guidance. I have continued his Norco for breakthrough pain relief until we completes his ablation therapies in his knees and look into his back. Plan: The following treatment recommendations and plan were discussed in detail with Tamera Pearson. Imaging:   No imaging reviewed    Analgesics:  Due to acute exacerbation of chronic low back pain due to his right toe amputations and change in gait, I have continued the patient on low-dose Norco 5 mg to take up to twice a day for severe pain (pain greater than 7/10). We will utilize this until we can complete better injection therapy for his knee and back pain. Patient is advised take this medication as prescribed otherwise taking more than prescribed can lead development tolerance, physical dependence, addiction. OARRS reviewed and is appropriate. Pain contract signed. Interventions: For his continued chronic right knee pain secondary to osteoarthritis, I set the patient up for right genicular nerve block under fluoroscopic guidance. The risk of the procedure discussed in detail the patient. Patient was proceed with the injection. Anticoagulation/NPO Recommendations:   Patient does not need to hold any medications prior to the procedure. Patient will need to be NPO x 8 hours prior to the procedure. Plan to start an IV prior to the procedure.     Multidisciplinary Pain Management:   In the presence of complex, chronic, and multi-factorial pain, the importance of a multidisciplinary approach to pain management in the patients management regimen was emphasized and discussed in great detail.      Referrals:  None    Prescriptions Written This Visit:   Norco 5 mg (#60, 0 refills)    Follow-up: For R genicular block      AVRIL Dumont - TEO  Interventional Pain Management/PM&R   [place]

## 2023-01-20 ENCOUNTER — PREP FOR PROCEDURE (OUTPATIENT)
Dept: PHYSICAL MEDICINE AND REHAB | Age: 58
End: 2023-01-20

## 2023-01-20 NOTE — DISCHARGE INSTRUCTIONS
Post procedure Instructions:    No driving or making significant decisions for the remainder of the day. Be cautions with walking and activity for 24 hours, do not over exert yourself. Ok to resume pre-procedure activity level today. Apply ice to site of injection site if you have pain or discomfort. Do not submerge sit of injection during bath or pool activities for 48 hours post-procedure. Resume blood thinning medications in 24 hours. Call office [phone] if you have:  Temperature greater than 100.4  Persistent nausea and vomiting  Severe uncontrolled pain  Redness, tenderness, or signs of infection (pain, swelling, redness, odor or green/yellow discharge around the site)  Difficulty breathing, headache or visual disturbances  Hives  Persistent dizziness or light-headedness  Extreme fatigue  Any other questions or concerns you may have after discharge    In an emergency, call 911 or go to an Emergency Department at a nearby hospital    Surgical Site Infections      How can we work together to prevent Surgical Site Infections? We would like to thank you for choosing [facility] for your Surgical Care. Below you will find helpful information on how we can work together to prevent Surgical Site Infections. What is a Surgical Site Infection (SSI)? A surgical site infection is an infection that occurs after surgery in the part of the body where the surgery took place. Most patients who have surgery do not develop an infection. However, infections develop in about 1 to 3 out of every 100 patients who have surgery. Some of the common symptoms of a surgical site infection are:  Redness and pain around the area where you had surgery  Drainage of cloudy fluid from your surgical wound  Fever    Can SSIs be treated? Yes. Most surgical site infections can be treated with antibiotics. The antibiotic given to you depends on the bacteria (germs) causing the infection.  Sometimes patients with SSIs also need another surgery to treat the infection. What are some of the things that hospitals are doing to prevent SSIs? To prevent SSIs, doctors, nurses, and other healthcare providers: May remove some of your hair immediately before your surgery using electric clippers if the hair is in the same area where the procedure will occur. They should not shave you with a razor. Give you antibiotics before your surgery starts. In most cases, you should get antibiotics within 60 minutes before the surgery starts and the antibiotics should be stopped within 24 hours after surgery. Clean the skin at the site of your surgery with a special soap that kills germs. Clean their hands and arms up to their elbows with an antiseptic agent just before the surgery. Wear special hair covers, masks, gowns, and gloves during surgery to  keep the surgery area clean. Clean their hands with soap and water or an alcohol-based hand rub before and after caring for each patient. If you do not see your providers clean their hands, please     ask  them to do so. What can I do to help prevent SSIs? Before your surgery:  Tell your doctor about other medical problems you may have. Health problems such as allergies, diabetes, and obesity could affect your surgery and your treatment. Quit smoking. Patients who smoke get more infections. Talk to your doctor about how you can quit before your surgery. Do not shave near where you will have surgery. Shaving with a razor can irritate your skin and make it easier to develop an infection. At the time of your surgery:  Speak up if someone tries to shave you with a razor before surgery. Ask why you need to be shaved and talk with your surgeon if you have any concerns. Ask if you will get antibiotics before surgery.   After your surgery:  Make sure that your healthcare providers clean their hands before examining you, either with soap and water or an alcohol-based hand rub.  Family and friends who visit you should not touch the surgical wound or dressings. Family and friends should clean their hands with soap and water or an alcohol-based hand rub before and after visiting you. If you do not see them clean their hands, ask them to clean their hands. What do I need to do when I go home from the hospital?  Before you go home, your doctor or nurse should explain everything you need to know about taking care of your wound. Make sure you understand how to care for your wound before you leave the hospital.  Always clean your hands before and after caring for your wound. Before you go home, make sure you know who to contact if you have questions or problems after you get home. If you have any symptoms of an infection, such as redness and pain at the surgery site, drainage, or fever, call your doctor immediately. If you have additional questions, please ask your doctor or nurse.

## 2023-01-23 NOTE — H&P
Today, patient presents for planned  right genicular nerve block    This note is reflective of the patient's previous visit for evaluation. We will proceed with today's planned procedure. Since patient's last visit for evaluation, there have been no interval changes in medical history. Patient has no new numbness, weakness, or focal neurological deficit since evaluation. Patient has no contraindications to injection (no anticoagulation or recent antibiotic intake for active infections), and has a  present or is able to drive themselves (as discussed and cleared by physician).  Allergies to latex, contrast dye, and steroid medications have been confirmed with the patient prior to the procedure.  NPO necessity has been assessed and accepted based on procedure complexity. The risks and benefits of the procedure have been explained including but are not limited to infection, bleeding, paralysis, immediate post procedure weakness, and dizziness; the patient acknowledges understanding and desires to proceed with the procedure. Patient has signed consent for same procedure as discussed in previous clinic encounter. All other questions and concerns were addressed at bedside. See procedure note for full details.       Post procedure Instructions: The patient was advised not to drive during the day of the procedure and not to engage in any significant decision making (unless otherwise states by physician). The patient was also advised to be cautious with walking/activity for 24 hours following today's visit and asked not to engage in over-exertion (unless otherwise states by physician). After this time, it is ok to resume pre-procedure level of activity. Patient advised to apply ice to site of injection in situations of pain and discomfort. Patient advised to not submerge site of injection during bath or pool activities for approximately 24 hours post-procedure. Patient attested to understanding post procedure  directions / restrictions. All other questions and concerns addressed before patient discharge in ambulatory fashion. Chronic Pain/PM&R Clinic Note     Encounter Date: 1/17/23    Subjective:   Chief Complaint:   No chief complaint on file. History of Present Illness:   Alvaro Nesbitt is a 65 y.o. male seen in the clinic initially on 10/20/21 for his history of chronic knee pain. He has a medical history of anxiety/depression and T2DM is s/p multiple toe amputations. He has longstanding history of bilateral knee pain for the last several years. He endorses the majority of pain to be on the medial and lateral aspect of the knees at the joint line. He states that his knee pain is a constant 5/10 aching, stabbing pain. His knee pain is worse with any activities where he is trying to go up and down stairs and really has a difficult time being on his legs for an extended duration of time. He denies any further radiation down his legs. He does have some low back pain was found to have an L1 compression fracture several months ago and states that he did not undergo a kyphoplasty but is wondering how this can be addressed. He describes the majority of his back pain to be axial in nature slightly above his waistline. This pain is described as a constant aching, nagging, stabbing pain that he rates as a constant 6/10 pain. He denies any pain that radiates further down his legs, associated leg weakness, leg paresthesias, saddle anesthesia, bowel/bladder incontinence. Today, 1/17/2023, patient presents for planned follow-up on chronic low back and bilateral knee pain. He was scheduled to have the right genicular nerve block on 12/22/2022. His blood sugar was around 500 at the time of the procedure and therefore it was canceled. He states he did not take any of his medications for 5 days before the procedure, which is why his blood sugar was elevated.   He states he has been taking his medications consistently. His blood sugar this morning was 167 which is about average for him. He states sometimes in the afternoon it will get him just over 200. 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 He states he has been doing a lot of walking and standing as well, which is led to increased pain. He is not sure if he will be able to maintain this job due to the pain. He would like to move forward with the right knee genicular nerve block. He denies any new symptoms at this time. Past Medical History:   Diagnosis Date    Anxiety     Depression     Diabetes ([facility] [address]     Hyperlipidemia     Hypertension     Seizures ([facility] [address]        Past Surgical History:   Procedure Laterality Date    KNEE ARTHROSCOPY Bilateral     LEG SURGERY      PAIN MANAGEMENT PROCEDURE Bilateral 12/14/2021    medial branch blocks at bilateral L2/L3 and L3/L4 performed by Nadia Solares DO at [place] [address] Bilateral 1/25/2022    Lumbar Facet MBB #2 L2-3,3-4 bilateral #2 performed by Nadia Solares DO at [place] [address] Right 2/16/2022    radiofrequency ablations  L2/L3 and L3/L4 facet joint. right side first performed by Nadia Solares DO at [facility] [address] Left 4/6/2022    L-sided lumbar RFA at L2-3 and L3-4 performed by Nadia Solares DO at [address] [place] [address] Bilateral     multiple toes amputation       Family History   Problem Relation Age of Onset    Hypertension Mother     Diabetes Mother        Medications & Allergies:   Current Outpatient Medications   Medication Instructions    aspirin 81 mg, Oral, DAILY    atorvastatin (LIPITOR) 20 mg, Oral, DAILY    baclofen (LIORESAL) 20 MG tablet No dose, route, or frequency recorded.     FLUoxetine (PROZAC) 10 mg, Oral, DAILY    glimepiride (AMARYL) 6 mg, Oral, EVERY MORNING    HYDROcodone-acetaminophen (NORCO) 5-325 MG per tablet 1 tablet, Oral, 2 TIMES DAILY PRN    insulin lispro (HUMALOG) 0-15 Units, SubCUTAneous, 3 TIMES DAILY BEFORE MEALS    lisinopril (PRINIVIL;ZESTRIL) 10 mg, Oral, DAILY    metFORMIN (GLUCOPHAGE) 500 mg, Oral, DAILY WITH BREAKFAST    pregabalin (LYRICA) 75 mg, Oral, 3 TIMES DAILY    XARELTO 20 MG TABS tablet TAKE 1 TABLET BY MOUTH ONCE DAILY WITH DINNER       No Known Allergies    Review of Systems:   Constitutional: negative for weight changes or fevers  Genitourinary: negative for bowel/bladder incontinence   Musculoskeletal: positive for low back pain and bilateral knee pain  Neurological: negative for any leg weakness or numbness/tingling  Behavioral/Psych: negative for anxiety/depression   All other systems reviewed and are negative    Objective: There were no vitals filed for this visit. Constitutional: Pleasant, no acute distress   Head: Normocephalic, atraumatic   Eyes: Conjunctivae normal   Neck: Supple, symmetrical   Lungs: Normal respiratory effort, non-labored breathing   Cardiovascular: Limbs warm and well perfused   Abdomen: Non-protruded   Musculoskeletal: Muscle bulk symmetric, no atrophy, no gross deformities   · Lower Extremities: Right foot wrapped in gauze  · Thorax: No paraspinal tenderness bilaterally. No scoliosis or kyphosis. · Lumbar Spine: ROM reduced in extension. Lumbar paraspinals tender to palpation bilaterally. SLR neg bilaterally. Positive facet loading bilaterally. - B/L Knees: TTP over medial and lateral joint lines. No appreciable fluid in knees. Ligamentous structures intact. Neurological: Cranial nerves II-XII grossly intact. · Gait - Antalgic gait. Ambulates with assistive device. · Motor: No focal motor deficits appreciated in lower limbs   Skin: No rashes or lesions present   Psychological: Cooperative, no exaggerated pain behaviors       Assessment:    Diagnosis Orders   1. Chronic pain of right knee        2. Primary osteoarthritis of both knees        3.  History of diabetic neuropathy        4. Other chronic pain          Marina Del Angel is a 65 y.o.male presenting to the pain clinic for evaluation of chronic low back and knee pain. His clinical history and physical examination are consistent with lumbar facet mediated pain secondary to an L1 compression fracture. In addition, he has bilateral knee pain secondary advanced osteoarthritis. We had a discussion about not stopping his medications. He denies any open wounds on his feet or in general. For his ongoing right knee pain I set him up for right genicular nerve block under fluoroscopic guidance. I have continued his Norco for breakthrough pain relief until we completes his ablation therapies in his knees and look into his back. Plan: The following treatment recommendations and plan were discussed in detail with Marina Del Angel. Imaging:   No imaging reviewed    Analgesics:  Due to acute exacerbation of chronic low back pain due to his right toe amputations and change in gait, I have continued the patient on low-dose Norco 5 mg to take up to twice a day for severe pain (pain greater than 7/10). We will utilize this until we can complete better injection therapy for his knee and back pain. Patient is advised take this medication as prescribed otherwise taking more than prescribed can lead development tolerance, physical dependence, addiction. OARRS reviewed and is appropriate. Pain contract signed. Interventions: For his continued chronic right knee pain secondary to osteoarthritis, I set the patient up for right genicular nerve block under fluoroscopic guidance. The risk of the procedure discussed in detail the patient. Patient was proceed with the injection. Anticoagulation/NPO Recommendations:   Patient does not need to hold any medications prior to the procedure. Patient will need to be NPO x 8 hours prior to the procedure. Plan to start an IV prior to the procedure.     Multidisciplinary Pain Management:   In the presence of complex, chronic, and multi-factorial pain, the importance of a multidisciplinary approach to pain management in the patients management regimen was emphasized and discussed in great detail.      Referrals:  None    Prescriptions Written This Visit:   Norco 5 mg (#60, 0 refills)    Follow-up: For R genicular block      Marty Berrios, DO  Interventional Pain Management/PM&R   [place]

## 2023-01-24 ENCOUNTER — APPOINTMENT (OUTPATIENT)
Dept: GENERAL RADIOLOGY | Age: 58
End: 2023-01-24
Attending: ANESTHESIOLOGY
Payer: MEDICARE

## 2023-01-24 ENCOUNTER — HOSPITAL ENCOUNTER (OUTPATIENT)
Age: 58
Setting detail: OUTPATIENT SURGERY
Discharge: HOME | End: 2023-01-24
Attending: ANESTHESIOLOGY | Admitting: ANESTHESIOLOGY
Payer: MEDICARE

## 2023-01-24 VITALS
SYSTOLIC BLOOD PRESSURE: 133 MMHG | HEIGHT: 73 IN | WEIGHT: 277.6 LBS | TEMPERATURE: 96.6 F | BODY MASS INDEX: 36.79 KG/M2 | RESPIRATION RATE: 16 BRPM | DIASTOLIC BLOOD PRESSURE: 76 MMHG | HEART RATE: 75 BPM | OXYGEN SATURATION: 95 %

## 2023-01-24 LAB — GLUCOSE BLD STRIP.AUTO-MCNC: 169 MG/DL (ref 70–108)

## 2023-01-24 PROCEDURE — 82948 REAGENT STRIP/BLOOD GLUCOSE: CPT

## 2023-01-24 PROCEDURE — 3600000055 HC PAIN LEVEL 3 ADDL 15 MIN: Performed by: ANESTHESIOLOGY

## 2023-01-24 PROCEDURE — 7100000010 HC PHASE II RECOVERY - FIRST 15 MIN: Performed by: ANESTHESIOLOGY

## 2023-01-24 PROCEDURE — 2500000003 HC RX 250 WO HCPCS: Performed by: ANESTHESIOLOGY

## 2023-01-24 PROCEDURE — 2709999900 HC NON-CHARGEABLE SUPPLY: Performed by: ANESTHESIOLOGY

## 2023-01-24 PROCEDURE — 6360000002 HC RX W HCPCS: Performed by: ANESTHESIOLOGY

## 2023-01-24 PROCEDURE — 3600000054 HC PAIN LEVEL 3 BASE: Performed by: ANESTHESIOLOGY

## 2023-01-24 PROCEDURE — 3209999900 FLUORO FOR SURGICAL PROCEDURES

## 2023-01-24 PROCEDURE — 99152 MOD SED SAME PHYS/QHP 5/>YRS: CPT | Performed by: ANESTHESIOLOGY

## 2023-01-24 RX ORDER — BUPIVACAINE HYDROCHLORIDE 2.5 MG/ML
INJECTION, SOLUTION EPIDURAL; INFILTRATION; INTRACAUDAL PRN
Status: DISCONTINUED | OUTPATIENT
Start: 2023-01-24 | End: 2023-01-24 | Stop reason: ALTCHOICE

## 2023-01-24 RX ORDER — MIDAZOLAM HYDROCHLORIDE 1 MG/ML
INJECTION INTRAMUSCULAR; INTRAVENOUS PRN
Status: DISCONTINUED | OUTPATIENT
Start: 2023-01-24 | End: 2023-01-24 | Stop reason: ALTCHOICE

## 2023-01-24 RX ORDER — LIDOCAINE HYDROCHLORIDE 10 MG/ML
INJECTION, SOLUTION EPIDURAL; INFILTRATION; INTRACAUDAL; PERINEURAL PRN
Status: DISCONTINUED | OUTPATIENT
Start: 2023-01-24 | End: 2023-01-24 | Stop reason: ALTCHOICE

## 2023-01-24 RX ORDER — FENTANYL CITRATE 50 UG/ML
INJECTION, SOLUTION INTRAMUSCULAR; INTRAVENOUS PRN
Status: DISCONTINUED | OUTPATIENT
Start: 2023-01-24 | End: 2023-01-24 | Stop reason: ALTCHOICE

## 2023-01-24 ASSESSMENT — PAIN SCALES - GENERAL: PAINLEVEL_OUTOF10: 0

## 2023-01-24 ASSESSMENT — PAIN DESCRIPTION - DESCRIPTORS: DESCRIPTORS: ACHING

## 2023-01-24 ASSESSMENT — PAIN - FUNCTIONAL ASSESSMENT: PAIN_FUNCTIONAL_ASSESSMENT: 0-10

## 2023-01-24 NOTE — PROGRESS NOTES
1004 Received in Phase 2 . Drowsy responsive to verbal stimuli. Airway patent. O2 sat 95% njection site clean and dry.   Denies pain on arrival.  1009 Water given per request  1015 Assisted to sit at side of cart to get dressed   1026 Discharged home via private car without complaint

## 2023-01-24 NOTE — PROCEDURES
Pre-operative Diagnosis: Knee pain     Post-operative Diagnosis: Knee pain     Procedure: RIGHT genicular nerve blocks     Procedure Description:  The patient was brought to the procedure room and placed on the exam table in a comfortable supine position. The place for needle placement was obtained by manual palpation with radiographic confirmation. The sterile field was prepared by chloroprep and sterile drapes. Local anesthesia superficial and deep was provided by local infiltration of 1% Lidocaine. Three 3.5 inch spinal needles were advanced to a bony endpoint on the superiolateral portion of the femoral condyle, superiomedial portion of the femoral condyle, and the inferiomedial portion of the tibial condyle until a bony endpoint was met. Lateral x-ray views showed all the needles at 50% depth of the femur and tibia. Then, 0.25 cc of 0.5% bupivacaine was injected after negative aspiration. The needles were withdrawn. The patient tolerated the procedure well. Patient was subsequently monitored in the post procedure setting and discharged in an ambulatory fashion.       Procedural Complications: None  Estimated Blood Loss: 0 mL     IV sedation was used during the procedure:  - Moderate intravenous conscious sedation was supervised by Dr. Mony Jimenez  - The patient was independently monitored by a Registered Nurse assigned to the procedure room  - Monitoring included automated blood pressure, continuous EKG, and continuous pulse oximetry  - The detailed conscious record is permanently stored in the [place] [id number]  - The following is the conscious sedation record:  Start Time: 09:55  End Time : 10:10  Duration: 15 minutes   Medications Administered: 1 mg Versed, 50 mcg Fentanyl        Marty Worrell DO  Interventional Pain Management/PM&R   [place]

## 2023-01-24 NOTE — PRE SEDATION
[facility]  Pre-Sedation/Analgesia History & Physical    Pt Name: Dang Mckenzie  MRN: [id number]  YOB: 1965  Provider Performing Procedure: Owens Fleischer, DO   Primary Care Physician: Kim Gaxiola MD, MD      MEDICAL HISTORY       has a past medical history of Anxiety, Depression, Diabetes ([facility] [address] Hyperlipidemia, Hypertension, and Seizures ([facility] [address] SURGICAL HISTORY   has a past surgical history that includes Leg Surgery; Knee arthroscopy (Bilateral); Toe amputation (Bilateral); Pain management procedure (Bilateral, 12/14/2021); Pain management procedure (Bilateral, 1/25/2022); Pain management procedure (Right, 2/16/2022); and Pain management procedure (Left, 4/6/2022). ALLERGIES   Allergies as of 01/17/2023    (No Known Allergies)       MEDICATIONS   Prior to Admission medications    Medication Sig Start Date End Date Taking? Authorizing Provider   HYDROcodone-acetaminophen (NORCO) 5-325 MG per tablet Take 1 tablet by mouth 2 times daily as needed for Pain for up to 30 days. 1/22/23 2/21/23  AVRIL Wheeler - CNP   XARELTO 20 MG TABS tablet TAKE 1 TABLET BY MOUTH ONCE DAILY WITH DINNER  Patient not taking: Reported on 1/24/2023 8/1/22   Historical Provider, MD   metFORMIN (GLUCOPHAGE) 500 MG tablet Take 500 mg by mouth daily (with breakfast)    Historical Provider, MD   atorvastatin (LIPITOR) 20 MG tablet Take 20 mg by mouth daily  Patient not taking: Reported on 1/24/2023 3/2/22   Historical Provider, MD   baclofen (LIORESAL) 20 MG tablet  3/2/22   Historical Provider, MD   lisinopril (PRINIVIL;ZESTRIL) 10 MG tablet Take 10 mg by mouth daily    Historical Provider, MD   FLUoxetine (PROZAC) 10 MG capsule Take 10 mg by mouth daily  Patient not taking: Reported on 1/24/2023    Historical Provider, MD   pregabalin (LYRICA) 75 MG capsule Take 1 capsule by mouth 3 times daily for 30 days. Patient taking differently: Take 100 mg by mouth 4 times daily.  2/28/21 1/24/23  Geovanny Domínguez MD Sandrine   glimepiride (AMARYL) 4 MG tablet Take 6 mg by mouth every morning    Historical Provider, MD   insulin lispro (HUMALOG) 100 UNIT/ML injection vial Inject 0-15 Units into the skin 3 times daily (before meals)  Patient not taking: Reported on 1/24/2023    Historical Provider, MD   aspirin 81 MG chewable tablet Take 81 mg by mouth daily    Historical Provider, MD     PHYSICAL:   Vitals:    01/24/23 1004   BP: 133/76   Pulse: 75   Resp: 16   Temp: (!) 96.6 °F (35.9 °C)   SpO2: 95%     PLANNED PROCEDURE   See procedure note  SEDATION  Planned agent: Versed and Fentanyl  ASA Classification: 2  Class 1: A normal healthy patient  Class 2: Pt with mild to moderate systemic disease  Class 3: Severe systemic disease or disturbance  Class 4: Severe systemic disorders that are already life threatening. Class 5: Moribund pt with little chances of survival, for more than 24 hours. Mallampati I Airway Classification: 2    1. Pre-procedure diagnostic studies complete and results available. 2. Previous sedation/anesthesia experiences assessed. 3. The patient is an appropriate candidate to undergo the planned procedure sedation and anesthesia. (Refer to nursing sedation/analgesia documentation record)  4. Formulation and discussion of sedation/procedure plan, risks, and expectations with patient and/or responsible adult completed. 5. Patient examined immediately prior to the procedure.  (Refer to nursing sedation/analgesia documentation record)    Odette Askew DO  Electronically signed 1/24/2023 at 12:35 PM

## 2023-02-08 ENCOUNTER — OFFICE VISIT (OUTPATIENT)
Dept: PHYSICAL MEDICINE AND REHAB | Age: 58
End: 2023-02-08
Payer: MEDICARE

## 2023-02-08 VITALS
DIASTOLIC BLOOD PRESSURE: 78 MMHG | WEIGHT: 278 LBS | BODY MASS INDEX: 36.84 KG/M2 | HEIGHT: 73 IN | SYSTOLIC BLOOD PRESSURE: 120 MMHG

## 2023-02-08 DIAGNOSIS — M47.816 LUMBAR SPONDYLOSIS: ICD-10-CM

## 2023-02-08 DIAGNOSIS — M47.816 LUMBAR FACET ARTHROPATHY: ICD-10-CM

## 2023-02-08 DIAGNOSIS — G89.29 OTHER CHRONIC PAIN: ICD-10-CM

## 2023-02-08 DIAGNOSIS — M48.56XA PATHOLOGICAL COMPRESSION FRACTURE OF LUMBAR VERTEBRA, INITIAL ENCOUNTER (HCC): ICD-10-CM

## 2023-02-08 DIAGNOSIS — M25.561 CHRONIC PAIN OF RIGHT KNEE: Primary | ICD-10-CM

## 2023-02-08 DIAGNOSIS — G89.29 CHRONIC PAIN OF RIGHT KNEE: Primary | ICD-10-CM

## 2023-02-08 DIAGNOSIS — M17.0 PRIMARY OSTEOARTHRITIS OF BOTH KNEES: ICD-10-CM

## 2023-02-08 PROCEDURE — G8484 FLU IMMUNIZE NO ADMIN: HCPCS | Performed by: NURSE PRACTITIONER

## 2023-02-08 PROCEDURE — 1036F TOBACCO NON-USER: CPT | Performed by: NURSE PRACTITIONER

## 2023-02-08 PROCEDURE — 3017F COLORECTAL CA SCREEN DOC REV: CPT | Performed by: NURSE PRACTITIONER

## 2023-02-08 PROCEDURE — 99214 OFFICE O/P EST MOD 30 MIN: CPT | Performed by: NURSE PRACTITIONER

## 2023-02-08 PROCEDURE — G8417 CALC BMI ABV UP PARAM F/U: HCPCS | Performed by: NURSE PRACTITIONER

## 2023-02-08 PROCEDURE — G8427 DOCREV CUR MEDS BY ELIG CLIN: HCPCS | Performed by: NURSE PRACTITIONER

## 2023-02-08 PROCEDURE — 3074F SYST BP LT 130 MM HG: CPT | Performed by: NURSE PRACTITIONER

## 2023-02-08 PROCEDURE — 3078F DIAST BP <80 MM HG: CPT | Performed by: NURSE PRACTITIONER

## 2023-02-08 RX ORDER — INSULIN GLARGINE AND LIXISENATIDE 100; 33 U/ML; UG/ML
INJECTION, SOLUTION SUBCUTANEOUS
COMMUNITY

## 2023-02-08 NOTE — PROGRESS NOTES
Chronic Pain/PM&R Clinic Note     Encounter Date: 2/8/23    Subjective:   Chief Complaint:   Chief Complaint   Patient presents with    Follow Up After Procedure     right genicular nerve block   Did help, but still having back pain  Had 2 ablasions done what's next       History of Present Illness:   Oscar Ventura is a 65 y.o. male seen in the clinic initially on 10/20/21 for his history of chronic knee pain. He has a medical history of anxiety/depression and T2DM is s/p multiple toe amputations. He has longstanding history of bilateral knee pain for the last several years. He endorses the majority of pain to be on the medial and lateral aspect of the knees at the joint line. He states that his knee pain is a constant 5/10 aching, stabbing pain. His knee pain is worse with any activities where he is trying to go up and down stairs and really has a difficult time being on his legs for an extended duration of time. He denies any further radiation down his legs. He does have some low back pain was found to have an L1 compression fracture several months ago and states that he did not undergo a kyphoplasty but is wondering how this can be addressed. He describes the majority of his back pain to be axial in nature slightly above his waistline. This pain is described as a constant aching, nagging, stabbing pain that he rates as a constant 6/10 pain. He denies any pain that radiates further down his legs, associated leg weakness, leg paresthesias, saddle anesthesia, bowel/bladder incontinence. Today, 02/08/2023, patient presents for planned follow-up on chronic knee and low back pain. He underwent a right genicular nerve block on 1/24/2023. He did obtain significant relief for a few days after the procedure and would like to proceed with the right genicular radiofrequency ablation for more longstanding relief. He states his low back has also been very bothersome.   He did undergo the lumbar radiofrequency ablation back in February and April 2022, respectively. He never feels like he did obtain significant relief from the ablation. He states it is difficult for him to stand longer than 10 minutes at a time. He has ended up in the ER a few times due to his low back pain flaring up. He is wondering what else we can do in regards to the low back pain. He denies any new leg paresthesias, new focal leg weakness, saddle anesthesia, bowel/bladder incontinence. Past Medical History:   Diagnosis Date    Anxiety     Depression     Diabetes ([facility] [address]     Hyperlipidemia     Hypertension     Seizures ([facility] [address]        Past Surgical History:   Procedure Laterality Date    KNEE ARTHROSCOPY Bilateral     LEG SURGERY      PAIN MANAGEMENT PROCEDURE Bilateral 12/14/2021    medial branch blocks at bilateral L2/L3 and L3/L4 performed by Jewels David DO at [facility] Bilateral 1/25/2022    Lumbar Facet MBB #2 L2-3,3-4 bilateral #2 performed by Jewels David DO at [facility] Right 2/16/2022    radiofrequency ablations  L2/L3 and L3/L4 facet joint. right side first performed by Jewels David DO at [facility] Left 4/6/2022    L-sided lumbar RFA at L2-3 and L3-4 performed by Jewels David DO at [facility] Right 1/24/2023    right genicular nerve block performed by Jewels David DO at [address] [place] [address] Bilateral     multiple toes amputation       Family History   Problem Relation Age of Onset    Hypertension Mother     Diabetes Mother        Medications & Allergies:   Current Outpatient Medications   Medication Instructions    aspirin 81 mg, Oral, DAILY    atorvastatin (LIPITOR) 20 mg, Oral, DAILY    baclofen (LIORESAL) 20 MG tablet No dose, route, or frequency recorded.     glimepiride (AMARYL) 6 mg, Oral, EVERY MORNING HYDROcodone-acetaminophen (NORCO) 5-325 MG per tablet 1 tablet, Oral, 2 TIMES DAILY PRN    Insulin Glargine-Lixisenatide (SOLIQUA) 100-33 UNT-MCG/ML SOPN SubCUTAneous    lisinopril (PRINIVIL;ZESTRIL) 10 mg, Oral, DAILY    metFORMIN (GLUCOPHAGE) 500 mg, Oral, DAILY WITH BREAKFAST    pregabalin (LYRICA) 75 mg, Oral, 3 TIMES DAILY    XARELTO 20 MG TABS tablet        No Known Allergies    Review of Systems:   Constitutional: negative for weight changes or fevers  Genitourinary: negative for bowel/bladder incontinence   Musculoskeletal: positive for low back pain and bilateral knee pain  Neurological: negative for any leg weakness or numbness/tingling  Behavioral/Psych: negative for anxiety/depression   All other systems reviewed and are negative    Objective:     Vitals:    02/08/23 0956   BP: 120/78     Constitutional: Pleasant, no acute distress   Head: Normocephalic, atraumatic   Eyes: Conjunctivae normal   Neck: Supple, symmetrical   Lungs: Normal respiratory effort, non-labored breathing   Cardiovascular: Limbs warm and well perfused   Abdomen: Non-protruded   Musculoskeletal: Muscle bulk symmetric, no atrophy, no gross deformities   · Lower Extremities: Right foot wrapped in gauze  · Thorax: No paraspinal tenderness bilaterally. No scoliosis or kyphosis. · Lumbar Spine: ROM reduced in extension. Lumbar paraspinals tender to palpation bilaterally. SLR neg bilaterally. Positive facet loading bilaterally. - B/L Knees: TTP over medial and lateral joint lines. No appreciable fluid in knees. Ligamentous structures intact. Neurological: Cranial nerves II-XII grossly intact. · Gait - Antalgic gait. Ambulates with assistive device. · Motor: No focal motor deficits appreciated in lower limbs   Skin: No rashes or lesions present   Psychological: Cooperative, no exaggerated pain behaviors       Assessment:    Diagnosis Orders   1.  Chronic pain of right knee  CHG FLUOR NEEDLE/CATH SPINE/PARASPINAL DX/THER ADDON    MD DSTRJ NEUROLYTIC AGENT OTHER PERIPHERAL NERVE      2. Primary osteoarthritis of both knees        3. Other chronic pain        4. Lumbar spondylosis        5. Lumbar facet arthropathy        6. Pathological compression fracture of lumbar vertebra, [facility]            Saurav Chiu is a 65 y.o.male presenting to the pain clinic for evaluation of chronic low back and knee pain. His clinical history and physical examination are consistent with lumbar facet mediated pain secondary to an L1 compression fracture. In addition, he has bilateral knee pain secondary advanced osteoarthritis. We had a discussion about not stopping his diabetic medications. He denies any open wounds on his feet or in general. For his ongoing right knee pain, he responded significantly to the  right genicular nerve block under fluoroscopic guidance. I have set him up for the right genicular radiofrequency ablation. I have continued his Norco for breakthrough pain relief until we completes his ablation therapies in his knees and look into his back. His low back pain seems to be lower than where he had his prior radiofrequency ablation. We did discuss potentially doing a facet medial branch block at L4-5 and L5-S1 after completion of his right genicular RFA. Plan: The following treatment recommendations and plan were discussed in detail with Saurav Chiu. Imaging:   No imaging reviewed    Analgesics:  Due to acute exacerbation of chronic low back pain due to his right toe amputations and change in gait, I have continued the patient on low-dose Norco 5 mg to take up to twice a day for severe pain (pain greater than 7/10). We will utilize this until we can complete better injection therapy for his knee and back pain. Patient is advised take this medication as prescribed otherwise taking more than prescribed can lead development tolerance, physical dependence, addiction.     OARRS reviewed and is appropriate. Pain contract signed. Interventions: For his continued chronic right knee pain secondary to osteoarthritis, I set the patient up for right genicular radiofrequency ablation under fluoroscopic guidance. The risk of the procedure discussed in detail the patient. Patient was proceed with the injection. Anticoagulation/NPO Recommendations:   Patient does not need to hold any medications prior to the procedure. Patient will need to be NPO x 8 hours prior to the procedure. Plan to start an IV prior to the procedure. Multidisciplinary Pain Management:   In the presence of complex, chronic, and multi-factorial pain, the importance of a multidisciplinary approach to pain management in the patients management regimen was emphasized and discussed in great detail.      Referrals:  None    Prescriptions Written This Visit:   None    Follow-up: R genicular RFA      Schuyler Denver, APRN - [place]  Interventional Pain Management/PM&R   [place]

## 2023-02-09 ENCOUNTER — HOSPITAL ENCOUNTER (OUTPATIENT)
Age: 58
Setting detail: OUTPATIENT SURGERY
Discharge: HOME | End: 2023-02-09
Attending: ANESTHESIOLOGY | Admitting: ANESTHESIOLOGY
Payer: MEDICARE

## 2023-02-09 ENCOUNTER — APPOINTMENT (OUTPATIENT)
Dept: GENERAL RADIOLOGY | Age: 58
End: 2023-02-09
Attending: ANESTHESIOLOGY
Payer: MEDICARE

## 2023-02-09 VITALS
DIASTOLIC BLOOD PRESSURE: 77 MMHG | SYSTOLIC BLOOD PRESSURE: 127 MMHG | WEIGHT: 283 LBS | TEMPERATURE: 97.7 F | OXYGEN SATURATION: 95 % | BODY MASS INDEX: 37.51 KG/M2 | HEIGHT: 73 IN | RESPIRATION RATE: 15 BRPM | HEART RATE: 100 BPM

## 2023-02-09 LAB — GLUCOSE BLD STRIP.AUTO-MCNC: 312 MG/DL (ref 70–108)

## 2023-02-09 PROCEDURE — 2709999900 HC NON-CHARGEABLE SUPPLY: Performed by: ANESTHESIOLOGY

## 2023-02-09 PROCEDURE — 99152 MOD SED SAME PHYS/QHP 5/>YRS: CPT | Performed by: ANESTHESIOLOGY

## 2023-02-09 PROCEDURE — 3600000054 HC PAIN LEVEL 3 BASE: Performed by: ANESTHESIOLOGY

## 2023-02-09 PROCEDURE — 2500000003 HC RX 250 WO HCPCS: Performed by: ANESTHESIOLOGY

## 2023-02-09 PROCEDURE — 3600000055 HC PAIN LEVEL 3 ADDL 15 MIN: Performed by: ANESTHESIOLOGY

## 2023-02-09 PROCEDURE — 6360000002 HC RX W HCPCS: Performed by: ANESTHESIOLOGY

## 2023-02-09 PROCEDURE — 6370000000 HC RX 637 (ALT 250 FOR IP): Performed by: ANESTHESIOLOGY

## 2023-02-09 PROCEDURE — 7100000010 HC PHASE II RECOVERY - FIRST 15 MIN: Performed by: ANESTHESIOLOGY

## 2023-02-09 PROCEDURE — 82948 REAGENT STRIP/BLOOD GLUCOSE: CPT

## 2023-02-09 PROCEDURE — 7100000011 HC PHASE II RECOVERY - ADDTL 15 MIN: Performed by: ANESTHESIOLOGY

## 2023-02-09 PROCEDURE — 3209999900 FLUORO FOR SURGICAL PROCEDURES

## 2023-02-09 RX ORDER — MIDAZOLAM HYDROCHLORIDE 1 MG/ML
INJECTION INTRAMUSCULAR; INTRAVENOUS PRN
Status: DISCONTINUED | OUTPATIENT
Start: 2023-02-09 | End: 2023-02-09 | Stop reason: ALTCHOICE

## 2023-02-09 RX ORDER — LIDOCAINE HYDROCHLORIDE 20 MG/ML
INJECTION, SOLUTION EPIDURAL; INFILTRATION; INTRACAUDAL; PERINEURAL PRN
Status: DISCONTINUED | OUTPATIENT
Start: 2023-02-09 | End: 2023-02-09 | Stop reason: ALTCHOICE

## 2023-02-09 RX ORDER — FENTANYL CITRATE 50 UG/ML
INJECTION, SOLUTION INTRAMUSCULAR; INTRAVENOUS PRN
Status: DISCONTINUED | OUTPATIENT
Start: 2023-02-09 | End: 2023-02-09 | Stop reason: ALTCHOICE

## 2023-02-09 RX ORDER — LIDOCAINE HYDROCHLORIDE 10 MG/ML
INJECTION, SOLUTION EPIDURAL; INFILTRATION; INTRACAUDAL; PERINEURAL PRN
Status: DISCONTINUED | OUTPATIENT
Start: 2023-02-09 | End: 2023-02-09 | Stop reason: ALTCHOICE

## 2023-02-09 RX ADMIN — INSULIN HUMAN 6 UNITS: 100 INJECTION, SOLUTION PARENTERAL at 09:00

## 2023-02-09 ASSESSMENT — PAIN SCALES - GENERAL: PAINLEVEL_OUTOF10: 0

## 2023-02-09 ASSESSMENT — PAIN DESCRIPTION - DESCRIPTORS: DESCRIPTORS: ACHING;BURNING

## 2023-02-09 NOTE — PROGRESS NOTES
9510-  Patient arrived to phase II via cart. Spontaneous respiraitons even and unlabored. Placed on monitor--VSS. Report received from [facility].   8669-  Assessment completed. Patient is alert and oriented x4. IV capped off-- no complications. Patient denies pain--will monitor. Injection sites clean and dry. 2892-  Snack and drink given to patient. Family in car. 5805-  IV removed-- no complications. Bandage applied. 9206-  Patient dressing. 5623-  Patient discharged in stable condition with all belongings. Aiden RN walked patient to car.

## 2023-02-09 NOTE — PRE SEDATION
[facility]  Pre-Sedation/Analgesia History & Physical    Pt Name: Oscar Ventura  MRN: [id number]  YOB: 1965  Provider Performing Procedure: Corie Jean DO   Primary Care Physician: Chaparro Carrillo MD, MD      MEDICAL HISTORY       has a past medical history of Anxiety, Depression, Diabetes ([facility] [address] Hyperlipidemia, Hypertension, and Seizures ([facility] [address] SURGICAL HISTORY   has a past surgical history that includes Leg Surgery; Knee arthroscopy (Bilateral); Toe amputation (Bilateral); Pain management procedure (Bilateral, 12/14/2021); Pain management procedure (Bilateral, 1/25/2022); Pain management procedure (Right, 2/16/2022); Pain management procedure (Left, 4/6/2022); and Pain management procedure (Right, 1/24/2023). ALLERGIES   Allergies as of 02/08/2023    (No Known Allergies)       MEDICATIONS   Prior to Admission medications    Medication Sig Start Date End Date Taking? Authorizing Provider   Insulin Glargine-Lixisenatide (SOLIQUA) 100-33 UNT-MCG/ML SOPN Inject into the skin  Patient not taking: Reported on 2/9/2023    Historical Provider, MD   HYDROcodone-acetaminophen (NORCO) 5-325 MG per tablet Take 1 tablet by mouth 2 times daily as needed for Pain for up to 30 days. 1/22/23 2/21/23  AVRIL Hearn - CNP   XARELTO 20 MG TABS tablet  8/1/22   Historical Provider, MD   metFORMIN (GLUCOPHAGE) 500 MG tablet Take 500 mg by mouth daily (with breakfast)    Historical Provider, MD   atorvastatin (LIPITOR) 20 MG tablet Take 20 mg by mouth daily 3/2/22   Historical Provider, MD   baclofen (LIORESAL) 20 MG tablet  3/2/22   Historical Provider, MD   lisinopril (PRINIVIL;ZESTRIL) 10 MG tablet Take 10 mg by mouth daily    Historical Provider, MD   pregabalin (LYRICA) 75 MG capsule Take 1 capsule by mouth 3 times daily for 30 days. Patient taking differently: Take 100 mg by mouth 4 times daily.  2/28/21 2/8/23  Calli Harrison MD   glimepiride (AMARYL) 4 MG tablet Take 6 mg by mouth every morning    Historical Provider, MD   aspirin 81 MG chewable tablet Take 81 mg by mouth daily  Patient not taking: Reported on 2/9/2023    Historical Provider, MD     PHYSICAL:   Vitals:    02/09/23 0921   BP: 127/77   Pulse: 100   Resp: 15   Temp: 97.7 °F (36.5 °C)   SpO2: 95%     PLANNED PROCEDURE   See procedure note  SEDATION  Planned agent: Versed and Fentanyl  ASA Classification: 2  Class 1: A normal healthy patient  Class 2: Pt with mild to moderate systemic disease  Class 3: Severe systemic disease or disturbance  Class 4: Severe systemic disorders that are already life threatening. Class 5: Moribund pt with little chances of survival, for more than 24 hours. Mallampati I Airway Classification: 2    1. Pre-procedure diagnostic studies complete and results available. 2. Previous sedation/anesthesia experiences assessed. 3. The patient is an appropriate candidate to undergo the planned procedure sedation and anesthesia. (Refer to nursing sedation/analgesia documentation record)  4. Formulation and discussion of sedation/procedure plan, risks, and expectations with patient and/or responsible adult completed. 5. Patient examined immediately prior to the procedure.  (Refer to nursing sedation/analgesia documentation record)    Jasson Shepherd DO  Electronically signed 2/9/2023 at 10:50 AM

## 2023-02-09 NOTE — PROCEDURES
Pre-operative Diagnosis: Knee pain     Post-operative Diagnosis: Knee pain     Procedure: RIGHT genicular thermal radiofrequency ablation     Procedure Description:  The patient was brought to the procedure room and placed on the exam table in a comfortable supine position. The knee was prepared with chloraprep and sterile drapes. Needle placement was confirmed with fluoroscopic guidance. The superficial skin and subcutaneous tissues were anesthetized by local infiltration of 1% Lidocaine. Three 5 cm cannulas were advanced in an AP view near the superiolateral portion of the femoral condyle, superiomedial portion of the femoral condyle, and the inferiomedial portion of the tibial condyle until a bony endpoint was met. Lateral x-ray views showed all the needles at 50% depth of the femur and tibia. Then, motor stimulation was tested at 2.0 volts with no leg movement. After negative aspiration, 1 cc of 2% were injected at each side port followed by a one minute wait to anesthetize the genicular nerves. Radiofrequency lesions were made at 80 degrees Celsius for a duration of 1 minute and 30 seconds followed by removal of the probe.         RFA Log     Impedance (I)                                      Superiomedial (SM)  -                          150-300  Superiolateral (SL)   -                           150-300  Inferiomedial (IM)    -                            150-300       Procedural Complications: None  Estimated Blood Loss: 0 mL        IV sedation was used during the procedure:  - Moderate intravenous conscious sedation was supervised by Dr. Wilma Armenta  - The patient was independently monitored by a Registered Nurse assigned to the procedure room  - Monitoring included automated blood pressure, continuous EKG, and continuous pulse oximetry  - The detailed conscious record is permanently stored in the [place] [id number]  - The following is the conscious sedation record:  Start Time: 09:08  End Time : 09:23  Duration: 15 minutes   Medications Administered: 2 mg Versed, 50 mcg Fentanyl        Marty Worrell DO  Interventional Pain Management/PM&R   [facility] and [address] [place] [address]

## 2023-02-09 NOTE — H&P
Today, patient presents for planned right genicular radiofrequency ablation    This note is reflective of the patient's previous visit for evaluation. We will proceed with today's planned procedure. Since patient's last visit for evaluation, there have been no interval changes in medical history. Patient has no new numbness, weakness, or focal neurological deficit since evaluation. Patient has no contraindications to injection (no anticoagulation or recent antibiotic intake for active infections), and has a  present or is able to drive themselves (as discussed and cleared by physician). Allergies to latex, contrast dye, and steroid medications have been confirmed with the patient prior to the procedure. NPO necessity has been assessed and accepted based on procedure complexity. The risks and benefits of the procedure have been explained including but are not limited to infection, bleeding, paralysis, immediate post procedure weakness, and dizziness; the patient acknowledges understanding and desires to proceed with the procedure. Patient has signed consent for same procedure as discussed in previous clinic encounter. All other questions and concerns were addressed at bedside. See procedure note for full details. Post procedure Instructions: The patient was advised not to drive during the day of the procedure and not to engage in any significant decision making (unless otherwise states by physician). The patient was also advised to be cautious with walking/activity for 24 hours following today's visit and asked not to engage in over-exertion (unless otherwise states by physician). After this time, it is ok to resume pre-procedure level of activity. Patient advised to apply ice to site of injection in situations of pain and discomfort. Patient advised to not submerge site of injection during bath or pool activities for approximately 24 hours post-procedure.  Patient attested to understanding post procedure directions / restrictions. All other questions and concerns addressed before patient discharge in ambulatory fashion. Chronic Pain/PM&R Clinic Note     Encounter Date: 2/8/23    Subjective:   Chief Complaint:   No chief complaint on file. History of Present Illness:   Soledad Pitts is a 65 y.o. male seen in the clinic initially on 10/20/21 for his history of chronic knee pain. He has a medical history of anxiety/depression and T2DM is s/p multiple toe amputations. He has longstanding history of bilateral knee pain for the last several years. He endorses the majority of pain to be on the medial and lateral aspect of the knees at the joint line. He states that his knee pain is a constant 5/10 aching, stabbing pain. His knee pain is worse with any activities where he is trying to go up and down stairs and really has a difficult time being on his legs for an extended duration of time. He denies any further radiation down his legs. He does have some low back pain was found to have an L1 compression fracture several months ago and states that he did not undergo a kyphoplasty but is wondering how this can be addressed. He describes the majority of his back pain to be axial in nature slightly above his waistline. This pain is described as a constant aching, nagging, stabbing pain that he rates as a constant 6/10 pain. He denies any pain that radiates further down his legs, associated leg weakness, leg paresthesias, saddle anesthesia, bowel/bladder incontinence. Today, 02/08/2023, patient presents for planned follow-up on chronic knee and low back pain. He underwent a right genicular nerve block on 1/24/2023. He did obtain significant relief for a few days after the procedure and would like to proceed with the right genicular radiofrequency ablation for more longstanding relief. He states his low back has also been very bothersome.   He did undergo the lumbar radiofrequency ablation back in February and April 2022, respectively. He never feels like he did obtain significant relief from the ablation. He states it is difficult for him to stand longer than 10 minutes at a time. He has ended up in the ER a few times due to his low back pain flaring up. He is wondering what else we can do in regards to the low back pain. He denies any new leg paresthesias, new focal leg weakness, saddle anesthesia, bowel/bladder incontinence. Past Medical History:   Diagnosis Date    Anxiety     Depression     Diabetes ([facility] [address]     Hyperlipidemia     Hypertension     Seizures ([facility] [address]        Past Surgical History:   Procedure Laterality Date    KNEE ARTHROSCOPY Bilateral     LEG SURGERY      PAIN MANAGEMENT PROCEDURE Bilateral 12/14/2021    medial branch blocks at bilateral L2/L3 and L3/L4 performed by Quinton Jiménez DO at [facility] Bilateral 1/25/2022    Lumbar Facet MBB #2 L2-3,3-4 bilateral #2 performed by Quinton Jiménez DO at [facility] Right 2/16/2022    radiofrequency ablations  L2/L3 and L3/L4 facet joint. right side first performed by Quinton Jiménez DO at [facility] Left 4/6/2022    L-sided lumbar RFA at L2-3 and L3-4 performed by Quinton Jiménez DO at [facility] Right 1/24/2023    right genicular nerve block performed by Quinton Jiménez DO at [address] [place] [address] Bilateral     multiple toes amputation       Family History   Problem Relation Age of Onset    Hypertension Mother     Diabetes Mother        Medications & Allergies:   Current Outpatient Medications   Medication Instructions    aspirin 81 mg, Oral, DAILY    atorvastatin (LIPITOR) 20 mg, Oral, DAILY    baclofen (LIORESAL) 20 MG tablet No dose, route, or frequency recorded.     glimepiride (AMARYL) 6 mg, Oral, EVERY MORNING    HYDROcodone-acetaminophen (NORCO) 5-325 MG per tablet 1 tablet, Oral, 2 TIMES DAILY PRN    Insulin Glargine-Lixisenatide (SOLIQUA) 100-33 UNT-MCG/ML SOPN SubCUTAneous    lisinopril (PRINIVIL;ZESTRIL) 10 mg, Oral, DAILY    metFORMIN (GLUCOPHAGE) 500 mg, Oral, DAILY WITH BREAKFAST    pregabalin (LYRICA) 75 mg, Oral, 3 TIMES DAILY    XARELTO 20 MG TABS tablet        No Known Allergies    Review of Systems:   Constitutional: negative for weight changes or fevers  Genitourinary: negative for bowel/bladder incontinence   Musculoskeletal: positive for low back pain and bilateral knee pain  Neurological: negative for any leg weakness or numbness/tingling  Behavioral/Psych: negative for anxiety/depression   All other systems reviewed and are negative    Objective: There were no vitals filed for this visit. Constitutional: Pleasant, no acute distress   Head: Normocephalic, atraumatic   Eyes: Conjunctivae normal   Neck: Supple, symmetrical   Lungs: Normal respiratory effort, non-labored breathing   Cardiovascular: Limbs warm and well perfused   Abdomen: Non-protruded   Musculoskeletal: Muscle bulk symmetric, no atrophy, no gross deformities   · Lower Extremities: Right foot wrapped in gauze  · Thorax: No paraspinal tenderness bilaterally. No scoliosis or kyphosis. · Lumbar Spine: ROM reduced in extension. Lumbar paraspinals tender to palpation bilaterally. SLR neg bilaterally. Positive facet loading bilaterally. - B/L Knees: TTP over medial and lateral joint lines. No appreciable fluid in knees. Ligamentous structures intact. Neurological: Cranial nerves II-XII grossly intact. · Gait - Antalgic gait. Ambulates with assistive device. · Motor: No focal motor deficits appreciated in lower limbs   Skin: No rashes or lesions present   Psychological: Cooperative, no exaggerated pain behaviors       Assessment:    Diagnosis Orders   1.  Chronic pain of right knee  CHG FLUOR NEEDLE/CATH SPINE/PARASPINAL DX/THER ADDON    [place] DSTRJ NEUROLYTIC AGENT OTHER PERIPHERAL NERVE      2. Primary osteoarthritis of both knees        3. Other chronic pain        4. Lumbar spondylosis        5. Lumbar facet arthropathy        6. Pathological compression fracture of lumbar vertebra, initial encounter ([facility] [address]            Franco Lee is a 65 y.o.male presenting to the pain clinic for evaluation of chronic low back and knee pain. His clinical history and physical examination are consistent with lumbar facet mediated pain secondary to an L1 compression fracture. In addition, he has bilateral knee pain secondary advanced osteoarthritis. We had a discussion about not stopping his diabetic medications. He denies any open wounds on his feet or in general. For his ongoing right knee pain, he responded significantly to the  right genicular nerve block under fluoroscopic guidance. I have set him up for the right genicular radiofrequency ablation. I have continued his Norco for breakthrough pain relief until we completes his ablation therapies in his knees and look into his back. His low back pain seems to be lower than where he had his prior radiofrequency ablation. We did discuss potentially doing a facet medial branch block at L4-5 and L5-S1 after completion of his right genicular RFA. Plan: The following treatment recommendations and plan were discussed in detail with Franco Lee. Imaging:   No imaging reviewed    Analgesics:  Due to acute exacerbation of chronic low back pain due to his right toe amputations and change in gait, I have continued the patient on low-dose Norco 5 mg to take up to twice a day for severe pain (pain greater than 7/10). We will utilize this until we can complete better injection therapy for his knee and back pain. Patient is advised take this medication as prescribed otherwise taking more than prescribed can lead development tolerance, physical dependence, addiction. OARRS reviewed and is appropriate.   Pain contract signed. Interventions: For his continued chronic right knee pain secondary to osteoarthritis, I set the patient up for right genicular radiofrequency ablation under fluoroscopic guidance. The risk of the procedure discussed in detail the patient. Patient was proceed with the injection. Anticoagulation/NPO Recommendations:   Patient does not need to hold any medications prior to the procedure. Patient will need to be NPO x 8 hours prior to the procedure. Plan to start an IV prior to the procedure. Multidisciplinary Pain Management:   In the presence of complex, chronic, and multi-factorial pain, the importance of a multidisciplinary approach to pain management in the patients management regimen was emphasized and discussed in great detail.      Referrals:  None    Prescriptions Written This Visit:   None    Follow-up: R genicular RFA      Marty Beatty DO  Interventional Pain Management/PM&R   [place]

## 2023-02-09 NOTE — POST SEDATION
[facility]  Sedation/Analgesia Post Sedation Record    Pt Name: Juan Headings  MRN: [id number]  YOB: 1965  Procedure Performed By: Shannon Pimentel DO  Primary Care Physician: Martínez Myers MD, MD    POST-PROCEDURE    Physicians/Assistants: Shannon Pimentel DO  Procedure Performed: See Procedure Note   Sedation/Anesthesia: Versed and Fentanyl (See procedure note for amount and duration)  Estimated Blood Loss:     0  ml  Specimens Removed: None        Complications: None           Marty Joyner DO  Electronically signed 2/9/2023 at 10:50 AM

## 2023-02-09 NOTE — DISCHARGE INSTRUCTIONS
Post procedure Instructions:    No driving or making significant decisions for the remainder of the day. Be cautions with walking and activity for 24 hours, do not over exert yourself. Ok to resume pre-procedure activity level today. Apply ice to site of injection site if you have pain or discomfort. Do not submerge sit of injection during bath or pool activities for 48 hours post-procedure. Resume blood thinning medications in 24 hours. Call office [phone] if you have:  Temperature greater than 100.4  Persistent nausea and vomiting  Severe uncontrolled pain  Redness, tenderness, or signs of infection (pain, swelling, redness, odor or green/yellow discharge around the site)  Difficulty breathing, headache or visual disturbances  Hives  Persistent dizziness or light-headedness  Extreme fatigue  Any other questions or concerns you may have after discharge    In an emergency, call 911 or go to an Emergency Department at a nearby hospital    Surgical Site Infections      How can we work together to prevent Surgical Site Infections? We would like to thank you for choosing [facility] for your Surgical Care. Below you will find helpful information on how we can work together to prevent Surgical Site Infections. What is a Surgical Site Infection (SSI)? A surgical site infection is an infection that occurs after surgery in the part of the body where the surgery took place. Most patients who have surgery do not develop an infection. However, infections develop in about 1 to 3 out of every 100 patients who have surgery. Some of the common symptoms of a surgical site infection are:  Redness and pain around the area where you had surgery  Drainage of cloudy fluid from your surgical wound  Fever    Can SSIs be treated? Yes. Most surgical site infections can be treated with antibiotics. The antibiotic given to you depends on the bacteria (germs) causing the infection.  Sometimes patients with SSIs also need another surgery to treat the infection. What are some of the things that hospitals are doing to prevent SSIs? To prevent SSIs, doctors, nurses, and other healthcare providers: May remove some of your hair immediately before your surgery using electric clippers if the hair is in the same area where the procedure will occur. They should not shave you with a razor. Give you antibiotics before your surgery starts. In most cases, you should get antibiotics within 60 minutes before the surgery starts and the antibiotics should be stopped within 24 hours after surgery. Clean the skin at the site of your surgery with a special soap that kills germs. Clean their hands and arms up to their elbows with an antiseptic agent just before the surgery. Wear special hair covers, masks, gowns, and gloves during surgery to  keep the surgery area clean. Clean their hands with soap and water or an alcohol-based hand rub before and after caring for each patient. If you do not see your providers clean their hands, please     ask  them to do so. What can I do to help prevent SSIs? Before your surgery:  Tell your doctor about other medical problems you may have. Health problems such as allergies, diabetes, and obesity could affect your surgery and your treatment. Quit smoking. Patients who smoke get more infections. Talk to your doctor about how you can quit before your surgery. Do not shave near where you will have surgery. Shaving with a razor can irritate your skin and make it easier to develop an infection. At the time of your surgery:  Speak up if someone tries to shave you with a razor before surgery. Ask why you need to be shaved and talk with your surgeon if you have any concerns. Ask if you will get antibiotics before surgery.   After your surgery:  Make sure that your healthcare providers clean their hands before examining you, either with soap and water or an alcohol-based hand rub.  Family and friends who visit you should not touch the surgical wound or dressings. Family and friends should clean their hands with soap and water or an alcohol-based hand rub before and after visiting you. If you do not see them clean their hands, ask them to clean their hands. What do I need to do when I go home from the hospital?  Before you go home, your doctor or nurse should explain everything you need to know about taking care of your wound. Make sure you understand how to care for your wound before you leave the hospital.  Always clean your hands before and after caring for your wound. Before you go home, make sure you know who to contact if you have questions or problems after you get home. If you have any symptoms of an infection, such as redness and pain at the surgery site, drainage, or fever, call your doctor immediately. If you have additional questions, please ask your doctor or nurse.

## 2023-02-16 ENCOUNTER — OFFICE VISIT (OUTPATIENT)
Dept: PHYSICAL MEDICINE AND REHAB | Age: 58
End: 2023-02-16

## 2023-02-16 ENCOUNTER — PREP FOR PROCEDURE (OUTPATIENT)
Dept: PHYSICAL MEDICINE AND REHAB | Age: 58
End: 2023-02-16

## 2023-02-16 VITALS
WEIGHT: 283 LBS | SYSTOLIC BLOOD PRESSURE: 154 MMHG | DIASTOLIC BLOOD PRESSURE: 86 MMHG | BODY MASS INDEX: 37.51 KG/M2 | HEIGHT: 73 IN

## 2023-02-16 DIAGNOSIS — G89.29 OTHER CHRONIC PAIN: ICD-10-CM

## 2023-02-16 DIAGNOSIS — M48.061 SPINAL STENOSIS OF LUMBAR REGION WITHOUT NEUROGENIC CLAUDICATION: ICD-10-CM

## 2023-02-16 DIAGNOSIS — G89.4 CHRONIC PAIN SYNDROME: ICD-10-CM

## 2023-02-16 DIAGNOSIS — M47.816 LUMBAR SPONDYLOSIS: Primary | ICD-10-CM

## 2023-02-16 DIAGNOSIS — M17.0 PRIMARY OSTEOARTHRITIS OF BOTH KNEES: ICD-10-CM

## 2023-02-16 DIAGNOSIS — M47.816 LUMBAR FACET ARTHROPATHY: ICD-10-CM

## 2023-02-16 RX ORDER — HYDROCODONE BITARTRATE AND ACETAMINOPHEN 5; 325 MG/1; MG/1
1 TABLET ORAL 2 TIMES DAILY PRN
Qty: 60 TABLET | Refills: 0 | Status: SHIPPED | OUTPATIENT
Start: 2023-02-21 | End: 2023-03-23

## 2023-02-16 NOTE — PROGRESS NOTES
Chronic Pain/PM&R Clinic Note     Encounter Date: 2/16/23    Subjective:   Chief Complaint:   Chief Complaint   Patient presents with    Follow Up After Procedure     right genicular radiofrequency ablation 2/9/23    Medication Refill     Norco        History of Present Illness:   Paulo Oscar is a 65 y.o. male seen in the clinic initially on 10/20/21 for his history of chronic knee pain. He has a medical history of anxiety/depression and T2DM is s/p multiple toe amputations. He has longstanding history of bilateral knee pain for the last several years. He endorses the majority of pain to be on the medial and lateral aspect of the knees at the joint line. He states that his knee pain is a constant 5/10 aching, stabbing pain. His knee pain is worse with any activities where he is trying to go up and down stairs and really has a difficult time being on his legs for an extended duration of time. He denies any further radiation down his legs. He does have some low back pain was found to have an L1 compression fracture several months ago and states that he did not undergo a kyphoplasty but is wondering how this can be addressed. He describes the majority of his back pain to be axial in nature slightly above his waistline. This pain is described as a constant aching, nagging, stabbing pain that he rates as a constant 6/10 pain. He denies any pain that radiates further down his legs, associated leg weakness, leg paresthesias, saddle anesthesia, bowel/bladder incontinence. Today, 02/08/2023, patient presents for planned follow-up on chronic knee and low back pain. He underwent a right genicular nerve block on 1/24/2023. He did obtain significant relief for a few days after the procedure and would like to proceed with the right genicular radiofrequency ablation for more longstanding relief. He states his low back has also been very bothersome.   He did undergo the lumbar radiofrequency ablation back in February and April 2022, respectively. He never feels like he did obtain significant relief from the ablation. He states it is difficult for him to stand longer than 10 minutes at a time. He has ended up in the ER a few times due to his low back pain flaring up. He is wondering what else we can do in regards to the low back pain. He denies any new leg paresthesias, new focal leg weakness, saddle anesthesia, bowel/bladder incontinence. Today, 02/16/2023, patient presents for planned follow-up on chronic pain. He underwent the right genicular radiofrequency ablation on 2/9/2023. He has started to notice improvement in the anterior aspect of his right knee. He states the posterior aspect has still been bothersome. He is wondering what other options are would be for the pain in his right knee. He states he has had an MRI of his right knee in the past but it has probably been 2 to 3 years. He does not remember the last time he did physical therapy for his right knee. In regards to his low back pain, this seems to be most bothersome recently. He states the flareups he gets we will send him to the emergency room. Again, he does not feel like he had a huge response to the lumbar radiofrequency ablation in the past.  He is wondering what other options there are. He denies any new leg paresthesias, new focal leg weakness, saddle anesthesia, bowel/bladder incontinence.     Injections  Lumbar MBB B/L L2/3 and L3/4 (12/14/2021, 01/25/2023)  Lumbar RFA (Right 02/16/2022, Left 04/06/2022)  Right genicular nerve block (01/24/2023)  Right genicular RFA (02/09/2023)     Current medications  Norco 5-325 mg BID PRN - prescribed by our office  Baclofen 20 mg   Lyrica 75 mg TID    Historic medications  Percocet - post op    Past Medical History:   Diagnosis Date    Anxiety     Depression     Diabetes ([address]     Hyperlipidemia     Hypertension     Seizures ([address]        Past Surgical History:   Procedure Laterality Date KNEE ARTHROSCOPY Bilateral     LEG SURGERY      PAIN MANAGEMENT PROCEDURE Bilateral 12/14/2021    medial branch blocks at bilateral L2/L3 and L3/L4 performed by Keila Mejia DO at [facility] Bilateral 1/25/2022    Lumbar Facet MBB #2 L2-3,3-4 bilateral #2 performed by Keila Mejia DO at [facility] Right 2/16/2022    radiofrequency ablations  L2/L3 and L3/L4 facet joint. right side first performed by Keila Mejia DO at [facility] Left 4/6/2022    L-sided lumbar RFA at L2-3 and L3-4 performed by Keila Mejia DO at [facility] Right 1/24/2023    right genicular nerve block performed by Keila Mejia DO at [facility] Right 2/9/2023    right genicular radiofrequency ablation performed by Keila Mejia DO at [address] [place] [address] Bilateral     multiple toes amputation       Family History   Problem Relation Age of Onset    Hypertension Mother     Diabetes Mother        Medications & Allergies:   Current Outpatient Medications   Medication Instructions    aspirin 81 mg, Oral, DAILY    atorvastatin (LIPITOR) 20 mg, Oral, DAILY    baclofen (LIORESAL) 20 MG tablet No dose, route, or frequency recorded.     glimepiride (AMARYL) 6 mg, Oral, EVERY MORNING    [START ON 2/21/2023] HYDROcodone-acetaminophen (NORCO) 5-325 MG per tablet 1 tablet, Oral, 2 TIMES DAILY PRN    Insulin Glargine-Lixisenatide (SOLIQUA) 100-33 UNT-MCG/ML SOPN SubCUTAneous    lisinopril (PRINIVIL;ZESTRIL) 10 mg, Oral, DAILY    metFORMIN (GLUCOPHAGE) 500 mg, Oral, DAILY WITH BREAKFAST    pregabalin (LYRICA) 75 mg, Oral, 3 TIMES DAILY       No Known Allergies    Review of Systems:   Constitutional: negative for weight changes or fevers  Genitourinary: negative for bowel/bladder incontinence   Musculoskeletal: positive for low back pain and bilateral knee pain  Neurological: negative for any leg weakness or numbness/tingling  Behavioral/Psych: negative for anxiety/depression   All other systems reviewed and are negative    Objective:     Vitals:    02/16/23 1037   BP: (!) 154/86       Constitutional: Pleasant, no acute distress   Head: Normocephalic, atraumatic   Eyes: Conjunctivae normal   Neck: Supple, symmetrical   Lungs: Normal respiratory effort, non-labored breathing   Cardiovascular: Limbs warm and well perfused   Abdomen: Non-protruded   Musculoskeletal: Muscle bulk symmetric, no atrophy, no gross deformities   · Lower Extremities: Right foot wrapped in gauze  · Thorax: No paraspinal tenderness bilaterally. No scoliosis or kyphosis. · Lumbar Spine: ROM reduced in extension. Lumbar paraspinals tender to palpation bilaterally. SLR neg bilaterally. Positive facet loading bilaterally. - B/L Knees: TTP over medial and lateral joint lines, left. No appreciable fluid in knees. Ligamentous structures intact. Neurological: Cranial nerves II-XII grossly intact. · Gait - Antalgic gait. Ambulates with assistive device. · Motor: No focal motor deficits appreciated in lower limbs   Skin: No rashes or lesions present   Psychological: Cooperative, no exaggerated pain behaviors       Assessment:    Diagnosis Orders   1. Lumbar spondylosis  CHG FLUOR NEEDLE/CATH SPINE/PARASPINAL DX/THER ADDON    FL NJX DX/THER AGT PVRT FACET JT LMBR/SAC 1 LEVEL    FL NJX DX/THER AGT PVRT FACET JT LMBR/SAC 2ND LEVEL      2. Other chronic pain  HYDROcodone-acetaminophen (NORCO) 5-325 MG per tablet      3. Lumbar facet arthropathy        4. Spinal stenosis of lumbar region without neurogenic claudication        5. Primary osteoarthritis of both knees        6. Chronic pain syndrome            Rigo Mclaughlin is a 65 y.o.male presenting to the pain clinic for evaluation of chronic low back and knee pain.   His clinical history and physical examination are consistent with lumbar facet mediated pain secondary to an L1 compression fracture. In addition, he has bilateral knee pain secondary advanced osteoarthritis. In regards to his ongoing right knee pain, he responded significantly to the  right genicular nerve RFA under fluoroscopic guidance. We will see if the pain in the posterior aspect improves with time. We did discuss potentially getting and updated right knee MRI. I have continued his Norco for breakthrough pain relief while he continues to work with injections. His low back pain seems to be lower than where he had his prior radiofrequency ablation. I have set him up for a lumbar facet medial branch block at bilateral L4-5 and L5-S1. After completion of the back we will likely move focus back to his knees. Plan: The following treatment recommendations and plan were discussed in detail with Dang Mckenzie. Imaging:   No imaging reviewed    Analgesics:  Due to acute exacerbation of chronic low back pain due to his right toe amputations and change in gait, I have continued the patient on low-dose Norco 5 mg to take up to twice a day for severe pain (pain greater than 7/10). We will utilize this until we can complete better injection therapy for his knee and back pain. Patient is advised take this medication as prescribed otherwise taking more than prescribed can lead development tolerance, physical dependence, addiction. OARRS reviewed and is appropriate. Pain contract signed. Interventions: In presence of lumbar axial back pain and with physical exam consistent for facetal pain, the option of medial branch blocks at bilateral L4/5 and L5/S1 was chosen. The risks and benefits were discussed in detail with the patient. Patient wants to proceed with the injection. Anticoagulation/NPO Recommendations:   Patient does not need to hold any medications prior to the procedure.   Patient will need to be NPO x 8 hours prior to the procedure. Plan to start an IV prior to the procedure. Multidisciplinary Pain Management:   In the presence of complex, chronic, and multi-factorial pain, the importance of a multidisciplinary approach to pain management in the patients management regimen was emphasized and discussed in great detail.      Referrals:  None    Prescriptions Written This Visit:   Norco 5-325 mg (#60, 0 refills)    Follow-up: lumbar MBB, in office 1 week after      AVRIL Lemus - CNP  Interventional Pain Management/PM&R   [place]

## 2023-02-20 NOTE — DISCHARGE INSTRUCTIONS
Post procedure Instructions:    No driving or making significant decisions for the remainder of the day. Be cautions with walking and activity for 24 hours, do not over exert yourself. Ok to resume pre-procedure activity level today. Apply ice to site of injection site if you have pain or discomfort. Do not submerge sit of injection during bath or pool activities for 48 hours post-procedure. Resume blood thinning medications in 24 hours. Call office [phone] if you have:  Temperature greater than 100.4  Persistent nausea and vomiting  Severe uncontrolled pain  Redness, tenderness, or signs of infection (pain, swelling, redness, odor or green/yellow discharge around the site)  Difficulty breathing, headache or visual disturbances  Hives  Persistent dizziness or light-headedness  Extreme fatigue  Any other questions or concerns you may have after discharge    In an emergency, call 911 or go to an Emergency Department at a nearby hospital    Surgical Site Infections      How can we work together to prevent Surgical Site Infections? We would like to thank you for choosing [facility] for your Surgical Care. Below you will find helpful information on how we can work together to prevent Surgical Site Infections. What is a Surgical Site Infection (SSI)? A surgical site infection is an infection that occurs after surgery in the part of the body where the surgery took place. Most patients who have surgery do not develop an infection. However, infections develop in about 1 to 3 out of every 100 patients who have surgery. Some of the common symptoms of a surgical site infection are:  Redness and pain around the area where you had surgery  Drainage of cloudy fluid from your surgical wound  Fever    Can SSIs be treated? Yes. Most surgical site infections can be treated with antibiotics. The antibiotic given to you depends on the bacteria (germs) causing the infection.  Sometimes patients with SSIs also need another surgery to treat the infection. What are some of the things that hospitals are doing to prevent SSIs? To prevent SSIs, doctors, nurses, and other healthcare providers: May remove some of your hair immediately before your surgery using electric clippers if the hair is in the same area where the procedure will occur. They should not shave you with a razor. Give you antibiotics before your surgery starts. In most cases, you should get antibiotics within 60 minutes before the surgery starts and the antibiotics should be stopped within 24 hours after surgery. Clean the skin at the site of your surgery with a special soap that kills germs. Clean their hands and arms up to their elbows with an antiseptic agent just before the surgery. Wear special hair covers, masks, gowns, and gloves during surgery to  keep the surgery area clean. Clean their hands with soap and water or an alcohol-based hand rub before and after caring for each patient. If you do not see your providers clean their hands, please     ask  them to do so. What can I do to help prevent SSIs? Before your surgery:  Tell your doctor about other medical problems you may have. Health problems such as allergies, diabetes, and obesity could affect your surgery and your treatment. Quit smoking. Patients who smoke get more infections. Talk to your doctor about how you can quit before your surgery. Do not shave near where you will have surgery. Shaving with a razor can irritate your skin and make it easier to develop an infection. At the time of your surgery:  Speak up if someone tries to shave you with a razor before surgery. Ask why you need to be shaved and talk with your surgeon if you have any concerns. Ask if you will get antibiotics before surgery.   After your surgery:  Make sure that your healthcare providers clean their hands before examining you, either with soap and water or an alcohol-based hand rub.  Family and friends who visit you should not touch the surgical wound or dressings. Family and friends should clean their hands with soap and water or an alcohol-based hand rub before and after visiting you. If you do not see them clean their hands, ask them to clean their hands. What do I need to do when I go home from the hospital?  Before you go home, your doctor or nurse should explain everything you need to know about taking care of your wound. Make sure you understand how to care for your wound before you leave the hospital.  Always clean your hands before and after caring for your wound. Before you go home, make sure you know who to contact if you have questions or problems after you get home. If you have any symptoms of an infection, such as redness and pain at the surgery site, drainage, or fever, call your doctor immediately. If you have additional questions, please ask your doctor or nurse.

## 2023-02-20 NOTE — H&P
Today, patient presents for planned medial branch blocks at bilateral L4/5 and L5/S1    This note is reflective of the patient's previous visit for evaluation. We will proceed with today's planned procedure. Since patient's last visit for evaluation, there have been no interval changes in medical history. Patient has no new numbness, weakness, or focal neurological deficit since evaluation. Patient has no contraindications to injection (no anticoagulation or recent antibiotic intake for active infections), and has a  present or is able to drive themselves (as discussed and cleared by physician). Allergies to latex, contrast dye, and steroid medications have been confirmed with the patient prior to the procedure. NPO necessity has been assessed and accepted based on procedure complexity. The risks and benefits of the procedure have been explained including but are not limited to infection, bleeding, paralysis, immediate post procedure weakness, and dizziness; the patient acknowledges understanding and desires to proceed with the procedure. Patient has signed consent for same procedure as discussed in previous clinic encounter. All other questions and concerns were addressed at bedside. See procedure note for full details. Post procedure Instructions: The patient was advised not to drive during the day of the procedure and not to engage in any significant decision making (unless otherwise states by physician). The patient was also advised to be cautious with walking/activity for 24 hours following today's visit and asked not to engage in over-exertion (unless otherwise states by physician). After this time, it is ok to resume pre-procedure level of activity. Patient advised to apply ice to site of injection in situations of pain and discomfort. Patient advised to not submerge site of injection during bath or pool activities for approximately 24 hours post-procedure.  Patient attested to understanding post procedure directions / restrictions. All other questions and concerns addressed before patient discharge in ambulatory fashion. Chronic Pain/PM&R Clinic Note     Encounter Date: 2/16/23    Subjective:   Chief Complaint:   No chief complaint on file. History of Present Illness:   Cyndy Jimenez is a 65 y.o. male seen in the clinic initially on 10/20/21 for his history of chronic knee pain. He has a medical history of anxiety/depression and T2DM is s/p multiple toe amputations. He has longstanding history of bilateral knee pain for the last several years. He endorses the majority of pain to be on the medial and lateral aspect of the knees at the joint line. He states that his knee pain is a constant 5/10 aching, stabbing pain. His knee pain is worse with any activities where he is trying to go up and down stairs and really has a difficult time being on his legs for an extended duration of time. He denies any further radiation down his legs. He does have some low back pain was found to have an L1 compression fracture several months ago and states that he did not undergo a kyphoplasty but is wondering how this can be addressed. He describes the majority of his back pain to be axial in nature slightly above his waistline. This pain is described as a constant aching, nagging, stabbing pain that he rates as a constant 6/10 pain. He denies any pain that radiates further down his legs, associated leg weakness, leg paresthesias, saddle anesthesia, bowel/bladder incontinence. Today, 02/08/2023, patient presents for planned follow-up on chronic knee and low back pain. He underwent a right genicular nerve block on 1/24/2023. He did obtain significant relief for a few days after the procedure and would like to proceed with the right genicular radiofrequency ablation for more longstanding relief. He states his low back has also been very bothersome.   He did undergo the lumbar radiofrequency ablation back in February and April 2022, respectively. He never feels like he did obtain significant relief from the ablation. He states it is difficult for him to stand longer than 10 minutes at a time. He has ended up in the ER a few times due to his low back pain flaring up. He is wondering what else we can do in regards to the low back pain. He denies any new leg paresthesias, new focal leg weakness, saddle anesthesia, bowel/bladder incontinence. Today, 02/16/2023, patient presents for planned follow-up on chronic pain. He underwent the right genicular radiofrequency ablation on 2/9/2023. He has started to notice improvement in the anterior aspect of his right knee. He states the posterior aspect has still been bothersome. He is wondering what other options are would be for the pain in his right knee. He states he has had an MRI of his right knee in the past but it has probably been 2 to 3 years. He does not remember the last time he did physical therapy for his right knee. In regards to his low back pain, this seems to be most bothersome recently. He states the flareups he gets we will send him to the emergency room. Again, he does not feel like he had a huge response to the lumbar radiofrequency ablation in the past.  He is wondering what other options there are. He denies any new leg paresthesias, new focal leg weakness, saddle anesthesia, bowel/bladder incontinence.     Injections  Lumbar MBB B/L L2/3 and L3/4 (12/14/2021, 01/25/2023)  Lumbar RFA (Right 02/16/2022, Left 04/06/2022)  Right genicular nerve block (01/24/2023)  Right genicular RFA (02/09/2023)     Current medications  Norco 5-325 mg BID PRN - prescribed by our office  Baclofen 20 mg   Lyrica 75 mg TID    Historic medications  Percocet - post op    Past Medical History:   Diagnosis Date    Anxiety     Depression     Diabetes ([address]     Hyperlipidemia     Hypertension     Seizures ([address]        Past Surgical History:   Procedure Laterality Date    KNEE ARTHROSCOPY Bilateral     LEG SURGERY      PAIN MANAGEMENT PROCEDURE Bilateral 12/14/2021    medial branch blocks at bilateral L2/L3 and L3/L4 performed by José Miguel Valdez DO at [facility] Bilateral 1/25/2022    Lumbar Facet MBB #2 L2-3,3-4 bilateral #2 performed by José Miguel Valdez DO at [facility] Right 2/16/2022    radiofrequency ablations  L2/L3 and L3/L4 facet joint. right side first performed by José Miguel Valdez DO at [facility] Left 4/6/2022    L-sided lumbar RFA at L2-3 and L3-4 performed by José Miguel Valdez DO at [facility] Right 1/24/2023    right genicular nerve block performed by José Miguel Valdez DO at [facility] Right 2/9/2023    right genicular radiofrequency ablation performed by José Miguel Valdez DO at [address] [place] [address] Bilateral     multiple toes amputation       Family History   Problem Relation Age of Onset    Hypertension Mother     Diabetes Mother        Medications & Allergies:   Current Outpatient Medications   Medication Instructions    aspirin 81 mg, Oral, DAILY    atorvastatin (LIPITOR) 20 mg, Oral, DAILY    baclofen (LIORESAL) 20 MG tablet No dose, route, or frequency recorded.     glimepiride (AMARYL) 6 mg, Oral, EVERY MORNING    [START ON 2/21/2023] HYDROcodone-acetaminophen (NORCO) 5-325 MG per tablet 1 tablet, Oral, 2 TIMES DAILY PRN    Insulin Glargine-Lixisenatide (SOLIQUA) 100-33 UNT-MCG/ML SOPN SubCUTAneous    lisinopril (PRINIVIL;ZESTRIL) 10 mg, Oral, DAILY    metFORMIN (GLUCOPHAGE) 500 mg, Oral, DAILY WITH BREAKFAST    pregabalin (LYRICA) 75 mg, Oral, 3 TIMES DAILY       No Known Allergies    Review of Systems:   Constitutional: negative for weight changes or fevers  Genitourinary: negative for bowel/bladder incontinence Musculoskeletal: positive for low back pain and bilateral knee pain  Neurological: negative for any leg weakness or numbness/tingling  Behavioral/Psych: negative for anxiety/depression   All other systems reviewed and are negative    Objective: There were no vitals filed for this visit. Constitutional: Pleasant, no acute distress   Head: Normocephalic, atraumatic   Eyes: Conjunctivae normal   Neck: Supple, symmetrical   Lungs: Normal respiratory effort, non-labored breathing   Cardiovascular: Limbs warm and well perfused   Abdomen: Non-protruded   Musculoskeletal: Muscle bulk symmetric, no atrophy, no gross deformities   · Lower Extremities: Right foot wrapped in gauze  · Thorax: No paraspinal tenderness bilaterally. No scoliosis or kyphosis. · Lumbar Spine: ROM reduced in extension. Lumbar paraspinals tender to palpation bilaterally. SLR neg bilaterally. Positive facet loading bilaterally. - B/L Knees: TTP over medial and lateral joint lines, left. No appreciable fluid in knees. Ligamentous structures intact. Neurological: Cranial nerves II-XII grossly intact. · Gait - Antalgic gait. Ambulates with assistive device. · Motor: No focal motor deficits appreciated in lower limbs   Skin: No rashes or lesions present   Psychological: Cooperative, no exaggerated pain behaviors       Assessment:    Diagnosis Orders   1. Lumbar spondylosis  CHG FLUOR NEEDLE/CATH SPINE/PARASPINAL DX/THER ADDON    WY NJX DX/THER AGT PVRT FACET JT LMBR/SAC 1 LEVEL    [place] NJX DX/THER AGT PVRT FACET JT LMBR/SAC 2ND LEVEL      2. Other chronic pain  HYDROcodone-acetaminophen (NORCO) 5-325 MG per tablet      3. Lumbar facet arthropathy        4. Spinal stenosis of lumbar region without neurogenic claudication        5. Primary osteoarthritis of both knees        6. Chronic pain syndrome            Henry Kapoor is a 65 y.o.male presenting to the pain clinic for evaluation of chronic low back and knee pain.   His clinical history and physical examination are consistent with lumbar facet mediated pain secondary to an L1 compression fracture. In addition, he has bilateral knee pain secondary advanced osteoarthritis. In regards to his ongoing right knee pain, he responded significantly to the  right genicular nerve RFA under fluoroscopic guidance. We will see if the pain in the posterior aspect improves with time. We did discuss potentially getting and updated right knee MRI. I have continued his Norco for breakthrough pain relief while he continues to work with injections. His low back pain seems to be lower than where he had his prior radiofrequency ablation. I have set him up for a lumbar facet medial branch block at bilateral L4-5 and L5-S1. After completion of the back we will likely move focus back to his knees. Plan: The following treatment recommendations and plan were discussed in detail with Josy Woodson. Imaging:   No imaging reviewed    Analgesics:  Due to acute exacerbation of chronic low back pain due to his right toe amputations and change in gait, I have continued the patient on low-dose Norco 5 mg to take up to twice a day for severe pain (pain greater than 7/10). We will utilize this until we can complete better injection therapy for his knee and back pain. Patient is advised take this medication as prescribed otherwise taking more than prescribed can lead development tolerance, physical dependence, addiction. OARRS reviewed and is appropriate. Pain contract signed. Interventions: In presence of lumbar axial back pain and with physical exam consistent for facetal pain, the option of medial branch blocks at bilateral L4/5 and L5/S1 was chosen. The risks and benefits were discussed in detail with the patient. Patient wants to proceed with the injection. Anticoagulation/NPO Recommendations:   Patient does not need to hold any medications prior to the procedure.   Patient will need to be NPO x 8 hours prior to the procedure. Plan to start an IV prior to the procedure. Multidisciplinary Pain Management:   In the presence of complex, chronic, and multi-factorial pain, the importance of a multidisciplinary approach to pain management in the patients management regimen was emphasized and discussed in great detail.      Referrals:  None    Prescriptions Written This Visit:   Norco 5-325 mg (#60, 0 refills)    Follow-up: lumbar MBB, in office 1 week after      Sunbright Fix, DO  Interventional Pain Management/PM&R   [place]

## 2023-02-21 ENCOUNTER — APPOINTMENT (OUTPATIENT)
Dept: GENERAL RADIOLOGY | Age: 58
End: 2023-02-21
Attending: ANESTHESIOLOGY
Payer: MEDICARE

## 2023-02-21 ENCOUNTER — HOSPITAL ENCOUNTER (OUTPATIENT)
Age: 58
Setting detail: OUTPATIENT SURGERY
Discharge: HOME | End: 2023-02-21
Attending: ANESTHESIOLOGY | Admitting: ANESTHESIOLOGY
Payer: MEDICARE

## 2023-02-21 VITALS
BODY MASS INDEX: 36.71 KG/M2 | TEMPERATURE: 48.2 F | HEIGHT: 73 IN | RESPIRATION RATE: 16 BRPM | HEART RATE: 84 BPM | WEIGHT: 277 LBS | SYSTOLIC BLOOD PRESSURE: 122 MMHG | DIASTOLIC BLOOD PRESSURE: 75 MMHG | OXYGEN SATURATION: 95 %

## 2023-02-21 LAB — GLUCOSE BLD STRIP.AUTO-MCNC: 247 MG/DL (ref 70–108)

## 2023-02-21 PROCEDURE — 82948 REAGENT STRIP/BLOOD GLUCOSE: CPT

## 2023-02-21 PROCEDURE — 64493 INJ PARAVERT F JNT L/S 1 LEV: CPT | Performed by: ANESTHESIOLOGY

## 2023-02-21 PROCEDURE — 3209999900 FLUORO FOR SURGICAL PROCEDURES

## 2023-02-21 PROCEDURE — 64494 INJ PARAVERT F JNT L/S 2 LEV: CPT | Performed by: ANESTHESIOLOGY

## 2023-02-21 PROCEDURE — 2500000003 HC RX 250 WO HCPCS: Performed by: ANESTHESIOLOGY

## 2023-02-21 PROCEDURE — 99152 MOD SED SAME PHYS/QHP 5/>YRS: CPT | Performed by: ANESTHESIOLOGY

## 2023-02-21 PROCEDURE — 6360000002 HC RX W HCPCS: Performed by: ANESTHESIOLOGY

## 2023-02-21 PROCEDURE — 2709999900 HC NON-CHARGEABLE SUPPLY: Performed by: ANESTHESIOLOGY

## 2023-02-21 PROCEDURE — 3600000056 HC PAIN LEVEL 4 BASE: Performed by: ANESTHESIOLOGY

## 2023-02-21 PROCEDURE — 7100000010 HC PHASE II RECOVERY - FIRST 15 MIN: Performed by: ANESTHESIOLOGY

## 2023-02-21 PROCEDURE — 7100000011 HC PHASE II RECOVERY - ADDTL 15 MIN: Performed by: ANESTHESIOLOGY

## 2023-02-21 RX ORDER — FENTANYL CITRATE 50 UG/ML
INJECTION, SOLUTION INTRAMUSCULAR; INTRAVENOUS PRN
Status: DISCONTINUED | OUTPATIENT
Start: 2023-02-21 | End: 2023-02-21 | Stop reason: ALTCHOICE

## 2023-02-21 RX ORDER — LIDOCAINE HYDROCHLORIDE 10 MG/ML
INJECTION, SOLUTION EPIDURAL; INFILTRATION; INTRACAUDAL; PERINEURAL PRN
Status: DISCONTINUED | OUTPATIENT
Start: 2023-02-21 | End: 2023-02-21 | Stop reason: ALTCHOICE

## 2023-02-21 RX ORDER — BUPIVACAINE HYDROCHLORIDE 5 MG/ML
INJECTION, SOLUTION PERINEURAL PRN
Status: DISCONTINUED | OUTPATIENT
Start: 2023-02-21 | End: 2023-02-21 | Stop reason: ALTCHOICE

## 2023-02-21 RX ORDER — BUPIVACAINE HYDROCHLORIDE 2.5 MG/ML
INJECTION, SOLUTION EPIDURAL; INFILTRATION; INTRACAUDAL PRN
Status: DISCONTINUED | OUTPATIENT
Start: 2023-02-21 | End: 2023-02-21 | Stop reason: ALTCHOICE

## 2023-02-21 RX ORDER — MIDAZOLAM HYDROCHLORIDE 1 MG/ML
INJECTION INTRAMUSCULAR; INTRAVENOUS PRN
Status: DISCONTINUED | OUTPATIENT
Start: 2023-02-21 | End: 2023-02-21 | Stop reason: ALTCHOICE

## 2023-02-21 ASSESSMENT — PAIN - FUNCTIONAL ASSESSMENT: PAIN_FUNCTIONAL_ASSESSMENT: 0-10

## 2023-02-21 NOTE — PROGRESS NOTES
1445 To recovery via cart. Spont resp. VSS. Report received from surgical rn. Denies pain numbness tingling or nausea. Denies need for snack and drink. IV discontinued. 1515 Discharge to home in stable ambulatory condition with wife.

## 2023-02-21 NOTE — PRE SEDATION
[facility]  Pre-Sedation/Analgesia History & Physical    Pt Name: Haven Gómez  MRN: [id number]  YOB: 1965  Provider Performing Procedure: José Johnson DO   Primary Care Physician: Leela Juarez MD, MD      MEDICAL HISTORY       has a past medical history of Anxiety, Depression, Diabetes ([facility] Hyperlipidemia, Hypertension, and Seizures ([facility] [address] [facility] SURGICAL HISTORY   has a past surgical history that includes Leg Surgery; Knee arthroscopy (Bilateral); Toe amputation (Bilateral); Pain management procedure (Bilateral, 12/14/2021); Pain management procedure (Bilateral, 1/25/2022); Pain management procedure (Right, 2/16/2022); Pain management procedure (Left, 4/6/2022); Pain management procedure (Right, 1/24/2023); and Pain management procedure (Right, 2/9/2023). ALLERGIES   Allergies as of 02/16/2023    (No Known Allergies)       MEDICATIONS   Prior to Admission medications    Medication Sig Start Date End Date Taking? Authorizing Provider   HYDROcodone-acetaminophen (NORCO) 5-325 MG per tablet Take 1 tablet by mouth 2 times daily as needed for Pain for up to 30 days. 2/21/23 3/23/23  AVRIL Rivera - CNP   Insulin Glargine-Lixisenatide (SOLIQUA) 100-33 UNT-MCG/ML SOPN Inject into the skin    Historical Provider, MD   metFORMIN (GLUCOPHAGE) 500 MG tablet Take 500 mg by mouth daily (with breakfast)    Historical Provider, MD   atorvastatin (LIPITOR) 20 MG tablet Take 20 mg by mouth daily 3/2/22   Historical Provider, MD   baclofen (LIORESAL) 20 MG tablet  3/2/22   Historical Provider, MD   lisinopril (PRINIVIL;ZESTRIL) 10 MG tablet Take 10 mg by mouth daily    Historical Provider, MD   pregabalin (LYRICA) 75 MG capsule Take 1 capsule by mouth 3 times daily for 30 days. Patient taking differently: Take 100 mg by mouth 4 times daily.  2/28/21 2/21/23  Wagner Ocampo MD   glimepiride (AMARYL) 4 MG tablet Take 6 mg by mouth every morning    Historical Provider, MD   aspirin 81 MG chewable tablet Take 81 mg by mouth daily    Historical Provider, MD     PHYSICAL:   Vitals:    02/21/23 1445   BP: 122/75   Pulse: 84   Resp: 16   Temp: (!) 48.2 °F (9 °C)   SpO2: 95%     PLANNED PROCEDURE   See procedure note  SEDATION  Planned agent: Versed and Fentanyl  ASA Classification: 2  Class 1: A normal healthy patient  Class 2: Pt with mild to moderate systemic disease  Class 3: Severe systemic disease or disturbance  Class 4: Severe systemic disorders that are already life threatening. Class 5: Moribund pt with little chances of survival, for more than 24 hours. Mallampati I Airway Classification: 2    1. Pre-procedure diagnostic studies complete and results available. 2. Previous sedation/anesthesia experiences assessed. 3. The patient is an appropriate candidate to undergo the planned procedure sedation and anesthesia. (Refer to nursing sedation/analgesia documentation record)  4. Formulation and discussion of sedation/procedure plan, risks, and expectations with patient and/or responsible adult completed. 5. Patient examined immediately prior to the procedure.  (Refer to nursing sedation/analgesia documentation record)    Rohini Azevedo DO  Electronically signed 2/21/2023 at 3:35 PM

## 2023-02-21 NOTE — POST SEDATION
[facility]  Sedation/Analgesia Post Sedation Record    Pt Name: Ricardo Arredondo  MRN: [id number]  YOB: 1965  Procedure Performed By: Jasmina Berrios DO  Primary Care Physician: Amado Bo MD, MD    POST-PROCEDURE    Physicians/Assistants: Jasmina Berrios DO  Procedure Performed: See Procedure Note   Sedation/Anesthesia: Versed and Fentanyl (See procedure note for amount and duration)  Estimated Blood Loss:     0  ml  Specimens Removed: None        Complications: None           Marty Shafer DO  Electronically signed 2/21/2023 at 3:35 PM

## 2023-02-21 NOTE — PROCEDURES
Pre-operative Diagnosis: Lumbar facet pain     Post-operative Diagnosis: Lumbar facet pain     Procedure: Bilateral lumbar medial branch blocks targeting facet joints of L4/L5 and L5/S1     Procedure Description:  After consent was obtained, the patient was placed in the prone position with a pillow under the abdomen to reduce the lordotic curve of the lumbar spine. The lower back was prepped with chloraprep and draped in a sterile fashion. Then, 0.5 cc of 1 % lidocaine was used for local anesthesia of the skin and superficial subcutaneous tissues. Three 22-gauge 3.5-inch spinal needles were advanced under fluoroscopy in an AP view: one at the sacral ala and two at the junction of the right superior articular process and the transverse process of the L4 and L5 vertebrae. There was no paresthesias, heme, or CSF obtained. Needle placement was confirmed using AP and oblique views. Then, 0.5 cc of 0.5% bupivacaine was injected at each site without complications. The procedure was repeated on the contralateral side. The patient tolerated the procedure well, transported to the recovery room, and discharged in ambulatory fashion.      Procedural Complications: None  Estimated Blood Loss: 0 mL      IV sedation was used during the procedure:  - Moderate intravenous conscious sedation was supervised by Dr. Darcy Ganser  - The patient was independently monitored by a Registered Nurse assigned to the procedure room  - Monitoring included automated blood pressure, continuous EKG, and continuous pulse oximetry  - The detailed conscious record is permanently stored in the [place] [id number]  - The following is the conscious sedation record:  Start Time: 14:36  End Time : 14:51  Duration: 15 minutes   Medications Administered: 2 mg Versed, 50 mcg Fentanyl     Marty Worrell DO  Interventional Pain Management/PM&R   [facility]

## 2023-03-01 NOTE — PROGRESS NOTES
Chronic Pain/PM&R Clinic Note     Encounter Date: 3/2/23    Subjective:   Chief Complaint:   Chief Complaint   Patient presents with    Follow Up After Procedure     medial branch blocks  bilateral Lumbar 4/5, 5/Sacral 1- 30-40%  right genicular radiofrequency ablation     History of Present Illness:   Sabino Dorado is a 65 y.o. male seen in the clinic initially on 10/20/21 for his history of chronic knee pain. He has a medical history of anxiety/depression and T2DM is s/p multiple toe amputations. He has longstanding history of bilateral knee pain for the last several years. He endorses the majority of pain to be on the medial and lateral aspect of the knees at the joint line. He states that his knee pain is a constant 5/10 aching, stabbing pain. His knee pain is worse with any activities where he is trying to go up and down stairs and really has a difficult time being on his legs for an extended duration of time. He denies any further radiation down his legs. He does have some low back pain was found to have an L1 compression fracture several months ago and states that he did not undergo a kyphoplasty but is wondering how this can be addressed. He describes the majority of his back pain to be axial in nature slightly above his waistline. This pain is described as a constant aching, nagging, stabbing pain that he rates as a constant 6/10 pain. He denies any pain that radiates further down his legs, associated leg weakness, leg paresthesias, saddle anesthesia, bowel/bladder incontinence. Today, 02/08/2023, patient presents for planned follow-up on chronic knee and low back pain. He underwent a right genicular nerve block on 1/24/2023. He did obtain significant relief for a few days after the procedure and would like to proceed with the right genicular radiofrequency ablation for more longstanding relief. He states his low back has also been very bothersome.   He did undergo the lumbar radiofrequency ablation back in February and April 2022, respectively. He never feels like he did obtain significant relief from the ablation. He states it is difficult for him to stand longer than 10 minutes at a time. He has ended up in the ER a few times due to his low back pain flaring up. He is wondering what else we can do in regards to the low back pain. He denies any new leg paresthesias, new focal leg weakness, saddle anesthesia, bowel/bladder incontinence. Today, 02/16/2023, patient presents for planned follow-up on chronic pain. He underwent the right genicular radiofrequency ablation on 2/9/2023. He has started to notice improvement in the anterior aspect of his right knee. He states the posterior aspect has still been bothersome. He is wondering what other options are would be for the pain in his right knee. He states he has had an MRI of his right knee in the past but it has probably been 2 to 3 years. He does not remember the last time he did physical therapy for his right knee. In regards to his low back pain, this seems to be most bothersome recently. He states the flareups he gets we will send him to the emergency room. Again, he does not feel like he had a huge response to the lumbar radiofrequency ablation in the past.  He is wondering what other options there are. He denies any new leg paresthesias, new focal leg weakness, saddle anesthesia, bowel/bladder incontinence. Today, 3/2/2023, patient presents for planned follow-up on chronic pain. He underwent the lumbar facet medial branch block at bilateral L4-5 and L5-S1 on 2/21/2023. He feels it was hard to evaluate whether he did get significant relief from the injection since it only did last for 2 days. While he does feel like it took the significant edge off of his pain. He would like to proceed with the confirmatory medial branch block at this level and likely the lumbar radiofrequency ablation.   He has been taking his current medications without side effects. He denies any open wounds at this time. He denies any new symptoms such as new leg paresthesias, new focal leg weakness, saddle anesthesia, bowel/bladder incontinence. Injections  Lumbar MBB B/L L2/3 and L3/4 (12/14/2021, 01/25/2023)  Lumbar RFA (Right 02/16/2022, Left 04/06/2022)  Right genicular nerve block (01/24/2023)  Right genicular RFA (02/09/2023)   Lumbar MBB B/L L4/5 and L5/S1 (02/21/2023) - significant relief x 2days    Current medications  Norco 5-325 mg BID PRN - prescribed by our office  Baclofen 20 mg   Lyrica 75 mg TID    Historic medications  Percocet - post op    Past Medical History:   Diagnosis Date    Anxiety     Depression     Diabetes ([facility] [address]     Hyperlipidemia     Hypertension     Seizures ([facility] [address]        Past Surgical History:   Procedure Laterality Date    KNEE ARTHROSCOPY Bilateral     LEG SURGERY      PAIN MANAGEMENT PROCEDURE Bilateral 12/14/2021    medial branch blocks at bilateral L2/L3 and L3/L4 performed by Quinton Jiménez DO at [facility] Bilateral 1/25/2022    Lumbar Facet MBB #2 L2-3,3-4 bilateral #2 performed by Quinton Jiménez DO at [facility] Right 2/16/2022    radiofrequency ablations  L2/L3 and L3/L4 facet joint.  right side first performed by Quinton Jiménez DO at [facility] Left 4/6/2022    L-sided lumbar RFA at L2-3 and L3-4 performed by Quinton Jiménez DO at [facility] Right 1/24/2023    right genicular nerve block performed by Quinton Jiménez DO at [facility] Right 2/9/2023    right genicular radiofrequency ablation performed by Quinton Jiménez DO at [facility] Bilateral 2/21/2023    medial branch blocks  bilateral Lumbar 4/5, 5/Sacral 1 performed by Quinton Jiménez DO at [facility] OR    TOE AMPUTATION Bilateral     multiple toes amputation       Family History   Problem Relation Age of Onset    Hypertension Mother     Diabetes Mother        Medications & Allergies:   Current Outpatient Medications   Medication Instructions    aspirin 81 mg, Oral, DAILY    atorvastatin (LIPITOR) 20 mg, Oral, DAILY    baclofen (LIORESAL) 20 MG tablet No dose, route, or frequency recorded. glimepiride (AMARYL) 6 mg, Oral, EVERY MORNING    HYDROcodone-acetaminophen (NORCO) 5-325 MG per tablet 1 tablet, Oral, 2 TIMES DAILY PRN    Insulin Glargine-Lixisenatide (SOLIQUA) 100-33 UNT-MCG/ML SOPN SubCUTAneous    lisinopril (PRINIVIL;ZESTRIL) 10 mg, Oral, DAILY    metFORMIN (GLUCOPHAGE) 500 mg, Oral, DAILY WITH BREAKFAST    pregabalin (LYRICA) 75 mg, Oral, 3 TIMES DAILY       No Known Allergies    Review of Systems:   Constitutional: negative for weight changes or fevers  Genitourinary: negative for bowel/bladder incontinence   Musculoskeletal: positive for low back pain and bilateral knee pain  Neurological: negative for any leg weakness or numbness/tingling  Behavioral/Psych: negative for anxiety/depression   All other systems reviewed and are negative    Objective:     Vitals:    03/02/23 0730   BP: 114/66         Constitutional: Pleasant, no acute distress   Head: Normocephalic, atraumatic   Eyes: Conjunctivae normal   Neck: Supple, symmetrical   Lungs: Normal respiratory effort, non-labored breathing   Cardiovascular: Limbs warm and well perfused   Abdomen: Non-protruded   Musculoskeletal: Muscle bulk symmetric, no atrophy, no gross deformities   · Lower Extremities: Right foot wrapped in gauze  · Thorax: No paraspinal tenderness bilaterally. No scoliosis or kyphosis. · Lumbar Spine: ROM reduced in extension. Lumbar paraspinals tender to palpation bilaterally. SLR neg bilaterally. Positive facet loading bilaterally. - B/L Knees: TTP over medial and lateral joint lines, left.  No appreciable fluid in knees. Ligamentous structures intact. Neurological: Cranial nerves II-XII grossly intact. · Gait - Antalgic gait. Ambulates with assistive device. · Motor: No focal motor deficits appreciated in lower limbs   Skin: No rashes or lesions present   Psychological: Cooperative, no exaggerated pain behaviors       Assessment:    Diagnosis Orders   1. Lumbar spondylosis  CHG FLUOR NEEDLE/CATH SPINE/PARASPINAL DX/THER ADDON    TN NJX DX/THER AGT PVRT FACET JT LMBR/SAC 1 LEVEL    TN NJX DX/THER AGT PVRT FACET JT LMBR/SAC 2ND LEVEL      2. Spinal stenosis of lumbar region without neurogenic claudication        3. Lumbar facet arthropathy        4. Chronic pain syndrome            Crystal Rdz is a 65 y.o.male presenting to the pain clinic for evaluation of chronic low back and knee pain. His clinical history and physical examination are consistent with lumbar facet mediated pain secondary to an L1 compression fracture. In addition, he has bilateral knee pain secondary advanced osteoarthritis. In regards to his ongoing right knee pain, he responded significantly to the  right genicular nerve RFA under fluoroscopic guidance. We will see if the pain in the posterior aspect improves with time. We did discuss potentially getting and updated right knee MRI. I have continued his Norco for breakthrough pain relief while he continues to work with injections. His low back pain seems to be lower than where he had his prior radiofrequency ablation. He had a successful diagnostic lumbar facet medial branch block at bilateral L4-5 and L5-S1. I have set him up for the confirmatory lumbar facet MBB targeting these levels. After completion of the back we will likely move focus back to his knees. Plan: The following treatment recommendations and plan were discussed in detail with Crystal Rdz.     Imaging:   No imaging reviewed    Analgesics:  Due to acute exacerbation of chronic low back pain due to his right toe amputations and change in gait, I have continued the patient on low-dose Norco 5 mg to take up to twice a day for severe pain (pain greater than 7/10). We will utilize this until we can complete better injection therapy for his knee and back pain. Patient is advised take this medication as prescribed otherwise taking more than prescribed can lead development tolerance, physical dependence, addiction. OARRS reviewed and is appropriate. Pain contract signed. Interventions: In presence of lumbar axial back pain and with physical exam consistent for facetal pain, the option of confirmatory medial branch blocks at bilateral L4/5 and L5/S1 was chosen. The risks and benefits were discussed in detail with the patient. Patient wants to proceed with the injection. Anticoagulation/NPO Recommendations:   Patient does not need to hold any medications prior to the procedure. Patient will need to be NPO x 8 hours prior to the procedure. Plan to start an IV prior to the procedure. Multidisciplinary Pain Management:   In the presence of complex, chronic, and multi-factorial pain, the importance of a multidisciplinary approach to pain management in the patients management regimen was emphasized and discussed in great detail.      Referrals:  None    Prescriptions Written This Visit:   None     Follow-up: lumbar MBB, in office 1 week after      AVRIL Gunderson - TEO  Interventional Pain Management/PM&R   [place]

## 2023-03-02 ENCOUNTER — OFFICE VISIT (OUTPATIENT)
Dept: PHYSICAL MEDICINE AND REHAB | Age: 58
End: 2023-03-02

## 2023-03-02 VITALS
HEIGHT: 73 IN | SYSTOLIC BLOOD PRESSURE: 114 MMHG | BODY MASS INDEX: 36.71 KG/M2 | DIASTOLIC BLOOD PRESSURE: 66 MMHG | WEIGHT: 277 LBS

## 2023-03-02 DIAGNOSIS — M48.061 SPINAL STENOSIS OF LUMBAR REGION WITHOUT NEUROGENIC CLAUDICATION: ICD-10-CM

## 2023-03-02 DIAGNOSIS — M47.816 LUMBAR SPONDYLOSIS: Primary | ICD-10-CM

## 2023-03-02 DIAGNOSIS — G89.4 CHRONIC PAIN SYNDROME: ICD-10-CM

## 2023-03-02 DIAGNOSIS — M47.816 LUMBAR FACET ARTHROPATHY: ICD-10-CM

## 2023-03-02 NOTE — DISCHARGE INSTRUCTIONS
Post procedure Instructions:    No driving or making significant decisions for the remainder of the day. Be cautions with walking and activity for 24 hours, do not over exert yourself. Ok to resume pre-procedure activity level today. Apply ice to site of injection site if you have pain or discomfort. Do not submerge sit of injection during bath or pool activities for 48 hours post-procedure. Resume blood thinning medications in 24 hours. Call office [phone] if you have:  Temperature greater than 100.4  Persistent nausea and vomiting  Severe uncontrolled pain  Redness, tenderness, or signs of infection (pain, swelling, redness, odor or green/yellow discharge around the site)  Difficulty breathing, headache or visual disturbances  Hives  Persistent dizziness or light-headedness  Extreme fatigue  Any other questions or concerns you may have after discharge    In an emergency, call 911 or go to an Emergency Department at a nearby hospital    Surgical Site Infections      How can we work together to prevent Surgical Site Infections? We would like to thank you for choosing [facility] for your Surgical Care. Below you will find helpful information on how we can work together to prevent Surgical Site Infections. What is a Surgical Site Infection (SSI)? A surgical site infection is an infection that occurs after surgery in the part of the body where the surgery took place. Most patients who have surgery do not develop an infection. However, infections develop in about 1 to 3 out of every 100 patients who have surgery. Some of the common symptoms of a surgical site infection are:  Redness and pain around the area where you had surgery  Drainage of cloudy fluid from your surgical wound  Fever    Can SSIs be treated? Yes. Most surgical site infections can be treated with antibiotics. The antibiotic given to you depends on the bacteria (germs) causing the infection.  Sometimes patients with SSIs also need another surgery to treat the infection. What are some of the things that hospitals are doing to prevent SSIs? To prevent SSIs, doctors, nurses, and other healthcare providers: May remove some of your hair immediately before your surgery using electric clippers if the hair is in the same area where the procedure will occur. They should not shave you with a razor. Give you antibiotics before your surgery starts. In most cases, you should get antibiotics within 60 minutes before the surgery starts and the antibiotics should be stopped within 24 hours after surgery. Clean the skin at the site of your surgery with a special soap that kills germs. Clean their hands and arms up to their elbows with an antiseptic agent just before the surgery. Wear special hair covers, masks, gowns, and gloves during surgery to  keep the surgery area clean. Clean their hands with soap and water or an alcohol-based hand rub before and after caring for each patient. If you do not see your providers clean their hands, please     ask  them to do so. What can I do to help prevent SSIs? Before your surgery:  Tell your doctor about other medical problems you may have. Health problems such as allergies, diabetes, and obesity could affect your surgery and your treatment. Quit smoking. Patients who smoke get more infections. Talk to your doctor about how you can quit before your surgery. Do not shave near where you will have surgery. Shaving with a razor can irritate your skin and make it easier to develop an infection. At the time of your surgery:  Speak up if someone tries to shave you with a razor before surgery. Ask why you need to be shaved and talk with your surgeon if you have any concerns. Ask if you will get antibiotics before surgery.   After your surgery:  Make sure that your healthcare providers clean their hands before examining you, either with soap and water or an alcohol-based hand rub.  Family and friends who visit you should not touch the surgical wound or dressings. Family and friends should clean their hands with soap and water or an alcohol-based hand rub before and after visiting you. If you do not see them clean their hands, ask them to clean their hands. What do I need to do when I go home from the hospital?  Before you go home, your doctor or nurse should explain everything you need to know about taking care of your wound. Make sure you understand how to care for your wound before you leave the hospital.  Always clean your hands before and after caring for your wound. Before you go home, make sure you know who to contact if you have questions or problems after you get home. If you have any symptoms of an infection, such as redness and pain at the surgery site, drainage, or fever, call your doctor immediately. If you have additional questions, please ask your doctor or nurse.

## 2023-03-08 ENCOUNTER — PREP FOR PROCEDURE (OUTPATIENT)
Dept: PHYSICAL MEDICINE AND REHAB | Age: 58
End: 2023-03-08

## 2023-03-09 ENCOUNTER — HOSPITAL ENCOUNTER (OUTPATIENT)
Age: 58
Setting detail: OUTPATIENT SURGERY
Discharge: HOME | End: 2023-03-09
Attending: ANESTHESIOLOGY | Admitting: ANESTHESIOLOGY
Payer: MEDICARE

## 2023-03-09 ENCOUNTER — APPOINTMENT (OUTPATIENT)
Dept: GENERAL RADIOLOGY | Age: 58
End: 2023-03-09
Attending: ANESTHESIOLOGY
Payer: MEDICARE

## 2023-03-09 VITALS
SYSTOLIC BLOOD PRESSURE: 137 MMHG | RESPIRATION RATE: 16 BRPM | DIASTOLIC BLOOD PRESSURE: 72 MMHG | BODY MASS INDEX: 36.84 KG/M2 | HEIGHT: 73 IN | WEIGHT: 278 LBS | TEMPERATURE: 97.2 F | HEART RATE: 88 BPM | OXYGEN SATURATION: 96 %

## 2023-03-09 LAB — GLUCOSE BLD STRIP.AUTO-MCNC: 275 MG/DL (ref 70–108)

## 2023-03-09 PROCEDURE — 3209999900 FLUORO FOR SURGICAL PROCEDURES

## 2023-03-09 PROCEDURE — 6360000002 HC RX W HCPCS: Performed by: ANESTHESIOLOGY

## 2023-03-09 PROCEDURE — 82948 REAGENT STRIP/BLOOD GLUCOSE: CPT

## 2023-03-09 PROCEDURE — 2709999900 HC NON-CHARGEABLE SUPPLY: Performed by: ANESTHESIOLOGY

## 2023-03-09 PROCEDURE — 2500000003 HC RX 250 WO HCPCS: Performed by: ANESTHESIOLOGY

## 2023-03-09 PROCEDURE — 3600000056 HC PAIN LEVEL 4 BASE: Performed by: ANESTHESIOLOGY

## 2023-03-09 PROCEDURE — 7100000011 HC PHASE II RECOVERY - ADDTL 15 MIN: Performed by: ANESTHESIOLOGY

## 2023-03-09 PROCEDURE — 99152 MOD SED SAME PHYS/QHP 5/>YRS: CPT | Performed by: ANESTHESIOLOGY

## 2023-03-09 PROCEDURE — 7100000010 HC PHASE II RECOVERY - FIRST 15 MIN: Performed by: ANESTHESIOLOGY

## 2023-03-09 RX ORDER — FENTANYL CITRATE 50 UG/ML
INJECTION, SOLUTION INTRAMUSCULAR; INTRAVENOUS PRN
Status: DISCONTINUED | OUTPATIENT
Start: 2023-03-09 | End: 2023-03-09 | Stop reason: ALTCHOICE

## 2023-03-09 RX ORDER — LIDOCAINE HYDROCHLORIDE 10 MG/ML
INJECTION, SOLUTION EPIDURAL; INFILTRATION; INTRACAUDAL; PERINEURAL PRN
Status: DISCONTINUED | OUTPATIENT
Start: 2023-03-09 | End: 2023-03-09 | Stop reason: ALTCHOICE

## 2023-03-09 RX ORDER — BUPIVACAINE HYDROCHLORIDE 5 MG/ML
INJECTION, SOLUTION PERINEURAL PRN
Status: DISCONTINUED | OUTPATIENT
Start: 2023-03-09 | End: 2023-03-09 | Stop reason: ALTCHOICE

## 2023-03-09 RX ORDER — MIDAZOLAM HYDROCHLORIDE 1 MG/ML
INJECTION INTRAMUSCULAR; INTRAVENOUS PRN
Status: DISCONTINUED | OUTPATIENT
Start: 2023-03-09 | End: 2023-03-09 | Stop reason: ALTCHOICE

## 2023-03-09 ASSESSMENT — PAIN - FUNCTIONAL ASSESSMENT: PAIN_FUNCTIONAL_ASSESSMENT: 0-10

## 2023-03-09 NOTE — PROCEDURES
Pre-operative Diagnosis: Lumbar facet pain     Post-operative Diagnosis: Lumbar facet pain     Procedure: Bilateral lumbar medial branch blocks targeting facet joints of L4/L5 and L5/S1     Procedure Description:  After consent was obtained, the patient was placed in the prone position with a pillow under the abdomen to reduce the lordotic curve of the lumbar spine. The lower back was prepped with chloraprep and draped in a sterile fashion. Then, 0.5 cc of 1 % lidocaine was used for local anesthesia of the skin and superficial subcutaneous tissues. Three 22-gauge 3.5-inch spinal needles were advanced under fluoroscopy in an AP view: one at the sacral ala and two at the junction of the right superior articular process and the transverse process of the L4 and L5 vertebrae. There was no paresthesias, heme, or CSF obtained. Needle placement was confirmed using AP and oblique views. Then, 0.5 cc of 0.5% bupivacaine was injected at each site without complications. The procedure was repeated on the contralateral side. The patient tolerated the procedure well, transported to the recovery room, and discharged in ambulatory fashion.      Procedural Complications: None  Estimated Blood Loss: 0 mL        IV sedation was used during the procedure:  - Moderate intravenous conscious sedation was supervised by Dr. Ian Sewell  - The patient was independently monitored by a Registered Nurse assigned to the procedure room  - Monitoring included automated blood pressure, continuous EKG, and continuous pulse oximetry  - The detailed conscious record is permanently stored in the [place] [id number]  - The following is the conscious sedation record:  Start Time: 09:38  End Time : 09:53  Duration: 15 minutes   Medications Administered: 2 mg Versed, 100 mcg Fentanyl     Marty Worrell DO  Interventional Pain Management/PM&R   [place]

## 2023-03-09 NOTE — H&P
Today, patient presents for planned confirmatory medial branch blocks at bilateral L4/5 and L5/S1    This note is reflective of the patient's previous visit for evaluation. We will proceed with today's planned procedure. Since patient's last visit for evaluation, there have been no interval changes in medical history. Patient has no new numbness, weakness, or focal neurological deficit since evaluation. Patient has no contraindications to injection (no anticoagulation or recent antibiotic intake for active infections), and has a  present or is able to drive themselves (as discussed and cleared by physician). Allergies to latex, contrast dye, and steroid medications have been confirmed with the patient prior to the procedure. NPO necessity has been assessed and accepted based on procedure complexity. The risks and benefits of the procedure have been explained including but are not limited to infection, bleeding, paralysis, immediate post procedure weakness, and dizziness; the patient acknowledges understanding and desires to proceed with the procedure. Patient has signed consent for same procedure as discussed in previous clinic encounter. All other questions and concerns were addressed at bedside. See procedure note for full details. Post procedure Instructions: The patient was advised not to drive during the day of the procedure and not to engage in any significant decision making (unless otherwise states by physician). The patient was also advised to be cautious with walking/activity for 24 hours following today's visit and asked not to engage in over-exertion (unless otherwise states by physician). After this time, it is ok to resume pre-procedure level of activity. Patient advised to apply ice to site of injection in situations of pain and discomfort. Patient advised to not submerge site of injection during bath or pool activities for approximately 24 hours post-procedure.  Patient attested to understanding post procedure directions / restrictions. All other questions and concerns addressed before patient discharge in ambulatory fashion. Chronic Pain/PM&R Clinic Note     Encounter Date: 3/2/23    Subjective:   Chief Complaint:   No chief complaint on file. History of Present Illness:   Teddie Schirmer is a 65 y.o. male seen in the clinic initially on 10/20/21 for his history of chronic knee pain. He has a medical history of anxiety/depression and T2DM is s/p multiple toe amputations. He has longstanding history of bilateral knee pain for the last several years. He endorses the majority of pain to be on the medial and lateral aspect of the knees at the joint line. He states that his knee pain is a constant 5/10 aching, stabbing pain. His knee pain is worse with any activities where he is trying to go up and down stairs and really has a difficult time being on his legs for an extended duration of time. He denies any further radiation down his legs. He does have some low back pain was found to have an L1 compression fracture several months ago and states that he did not undergo a kyphoplasty but is wondering how this can be addressed. He describes the majority of his back pain to be axial in nature slightly above his waistline. This pain is described as a constant aching, nagging, stabbing pain that he rates as a constant 6/10 pain. He denies any pain that radiates further down his legs, associated leg weakness, leg paresthesias, saddle anesthesia, bowel/bladder incontinence. Today, 02/08/2023, patient presents for planned follow-up on chronic knee and low back pain. He underwent a right genicular nerve block on 1/24/2023. He did obtain significant relief for a few days after the procedure and would like to proceed with the right genicular radiofrequency ablation for more longstanding relief. He states his low back has also been very bothersome.   He did undergo the lumbar radiofrequency ablation back in February and April 2022, respectively. He never feels like he did obtain significant relief from the ablation. He states it is difficult for him to stand longer than 10 minutes at a time. He has ended up in the ER a few times due to his low back pain flaring up. He is wondering what else we can do in regards to the low back pain. He denies any new leg paresthesias, new focal leg weakness, saddle anesthesia, bowel/bladder incontinence. Today, 02/16/2023, patient presents for planned follow-up on chronic pain. He underwent the right genicular radiofrequency ablation on 2/9/2023. He has started to notice improvement in the anterior aspect of his right knee. He states the posterior aspect has still been bothersome. He is wondering what other options are would be for the pain in his right knee. He states he has had an MRI of his right knee in the past but it has probably been 2 to 3 years. He does not remember the last time he did physical therapy for his right knee. In regards to his low back pain, this seems to be most bothersome recently. He states the flareups he gets we will send him to the emergency room. Again, he does not feel like he had a huge response to the lumbar radiofrequency ablation in the past.  He is wondering what other options there are. He denies any new leg paresthesias, new focal leg weakness, saddle anesthesia, bowel/bladder incontinence. Today, 3/2/2023, patient presents for planned follow-up on chronic pain. He underwent the lumbar facet medial branch block at bilateral L4-5 and L5-S1 on 2/21/2023. He feels it was hard to evaluate whether he did get significant relief from the injection since it only did last for 2 days. While he does feel like it took the significant edge off of his pain. He would like to proceed with the confirmatory medial branch block at this level and likely the lumbar radiofrequency ablation.   He has been taking his current medications without side effects. He denies any open wounds at this time. He denies any new symptoms such as new leg paresthesias, new focal leg weakness, saddle anesthesia, bowel/bladder incontinence. Injections  Lumbar MBB B/L L2/3 and L3/4 (12/14/2021, 01/25/2023)  Lumbar RFA (Right 02/16/2022, Left 04/06/2022)  Right genicular nerve block (01/24/2023)  Right genicular RFA (02/09/2023)   Lumbar MBB B/L L4/5 and L5/S1 (02/21/2023) - significant relief x 2days    Current medications  Norco 5-325 mg BID PRN - prescribed by our office  Baclofen 20 mg   Lyrica 75 mg TID    Historic medications  Percocet - post op    Past Medical History:   Diagnosis Date    Anxiety     Depression     Diabetes ([facility] [address]     Hyperlipidemia     Hypertension     Seizures ([facility] [address]        Past Surgical History:   Procedure Laterality Date    KNEE ARTHROSCOPY Bilateral     LEG SURGERY      PAIN MANAGEMENT PROCEDURE Bilateral 12/14/2021    medial branch blocks at bilateral L2/L3 and L3/L4 performed by Nadia Solares DO at [facility] Bilateral 1/25/2022    Lumbar Facet MBB #2 L2-3,3-4 bilateral #2 performed by Nadia Solares DO at [facility] Right 2/16/2022    radiofrequency ablations  L2/L3 and L3/L4 facet joint.  right side first performed by Nadia Solares DO at [facility] Left 4/6/2022    L-sided lumbar RFA at L2-3 and L3-4 performed by Nadia Solares DO at [facility] Right 1/24/2023    right genicular nerve block performed by Nadia Solares DO at [facility] Right 2/9/2023    right genicular radiofrequency ablation performed by Nadia Solares DO at [facility] Bilateral 2/21/2023    medial branch blocks  bilateral Lumbar 4/5, 5/Sacral 1 performed by Nadia Solares DO at [facility] OR    TOE AMPUTATION Bilateral     multiple toes amputation       Family History   Problem Relation Age of Onset    Hypertension Mother     Diabetes Mother        Medications & Allergies:   Current Outpatient Medications   Medication Instructions    aspirin 81 mg, Oral, DAILY    atorvastatin (LIPITOR) 20 mg, Oral, DAILY    baclofen (LIORESAL) 20 MG tablet No dose, route, or frequency recorded. glimepiride (AMARYL) 6 mg, Oral, EVERY MORNING    HYDROcodone-acetaminophen (NORCO) 5-325 MG per tablet 1 tablet, Oral, 2 TIMES DAILY PRN    Insulin Glargine-Lixisenatide (SOLIQUA) 100-33 UNT-MCG/ML SOPN SubCUTAneous    lisinopril (PRINIVIL;ZESTRIL) 10 mg, Oral, DAILY    metFORMIN (GLUCOPHAGE) 500 mg, Oral, DAILY WITH BREAKFAST    pregabalin (LYRICA) 75 mg, Oral, 3 TIMES DAILY       No Known Allergies    Review of Systems:   Constitutional: negative for weight changes or fevers  Genitourinary: negative for bowel/bladder incontinence   Musculoskeletal: positive for low back pain and bilateral knee pain  Neurological: negative for any leg weakness or numbness/tingling  Behavioral/Psych: negative for anxiety/depression   All other systems reviewed and are negative    Objective: There were no vitals filed for this visit. Constitutional: Pleasant, no acute distress   Head: Normocephalic, atraumatic   Eyes: Conjunctivae normal   Neck: Supple, symmetrical   Lungs: Normal respiratory effort, non-labored breathing   Cardiovascular: Limbs warm and well perfused   Abdomen: Non-protruded   Musculoskeletal: Muscle bulk symmetric, no atrophy, no gross deformities   · Lower Extremities: Right foot wrapped in gauze  · Thorax: No paraspinal tenderness bilaterally. No scoliosis or kyphosis. · Lumbar Spine: ROM reduced in extension. Lumbar paraspinals tender to palpation bilaterally. SLR neg bilaterally. Positive facet loading bilaterally. - B/L Knees: TTP over medial and lateral joint lines, left.  No appreciable fluid in knees. Ligamentous structures intact. Neurological: Cranial nerves II-XII grossly intact. · Gait - Antalgic gait. Ambulates with assistive device. · Motor: No focal motor deficits appreciated in lower limbs   Skin: No rashes or lesions present   Psychological: Cooperative, no exaggerated pain behaviors       Assessment:    Diagnosis Orders   1. Lumbar spondylosis  CHG FLUOR NEEDLE/CATH SPINE/PARASPINAL DX/THER ADDON    WA NJX DX/THER AGT PVRT FACET JT LMBR/SAC 1 LEVEL    WA NJX DX/THER AGT PVRT FACET JT LMBR/SAC 2ND LEVEL      2. Spinal stenosis of lumbar region without neurogenic claudication        3. Lumbar facet arthropathy        4. Chronic pain syndrome            Dang Mckenzie is a 65 y.o.male presenting to the pain clinic for evaluation of chronic low back and knee pain. His clinical history and physical examination are consistent with lumbar facet mediated pain secondary to an L1 compression fracture. In addition, he has bilateral knee pain secondary advanced osteoarthritis. In regards to his ongoing right knee pain, he responded significantly to the  right genicular nerve RFA under fluoroscopic guidance. We will see if the pain in the posterior aspect improves with time. We did discuss potentially getting and updated right knee MRI. I have continued his Norco for breakthrough pain relief while he continues to work with injections. His low back pain seems to be lower than where he had his prior radiofrequency ablation. He had a successful diagnostic lumbar facet medial branch block at bilateral L4-5 and L5-S1. I have set him up for the confirmatory lumbar facet MBB targeting these levels. After completion of the back we will likely move focus back to his knees. Plan: The following treatment recommendations and plan were discussed in detail with Dang Mckenzie.     Imaging:   No imaging reviewed    Analgesics:  Due to acute exacerbation of chronic low back pain due to his right toe amputations and change in gait, I have continued the patient on low-dose Norco 5 mg to take up to twice a day for severe pain (pain greater than 7/10). We will utilize this until we can complete better injection therapy for his knee and back pain. Patient is advised take this medication as prescribed otherwise taking more than prescribed can lead development tolerance, physical dependence, addiction. OARRS reviewed and is appropriate. Pain contract signed. Interventions: In presence of lumbar axial back pain and with physical exam consistent for facetal pain, the option of confirmatory medial branch blocks at bilateral L4/5 and L5/S1 was chosen. The risks and benefits were discussed in detail with the patient. Patient wants to proceed with the injection. Anticoagulation/NPO Recommendations:   Patient does not need to hold any medications prior to the procedure. Patient will need to be NPO x 8 hours prior to the procedure. Plan to start an IV prior to the procedure. Multidisciplinary Pain Management:   In the presence of complex, chronic, and multi-factorial pain, the importance of a multidisciplinary approach to pain management in the patients management regimen was emphasized and discussed in great detail.      Referrals:  None    Prescriptions Written This Visit:   None     Follow-up: lumbar MBB, in office 1 week after      Marty Bolton, DO  Interventional Pain Management/PM&R   [place]

## 2023-03-09 NOTE — PROGRESS NOTES
7120: Patient to phase 2 recovery room via cart. Patient is awake and alert. Report received from surgical RN, Reshma Rajan. Patient's vitals obtained, see charting. 0945: Patient is denying pain and nausea at this time. IV is INT'd.   3729: Offered drink provided to the patient. Bed is in the lowest position and call light is within reach. 8345: IV removed at this time- no complications and dressing applied. Patient states he understood his discharge instructions given in pre op. 1003: Patient ambulated to the car and discharged home in stable condition with his son.

## 2023-03-09 NOTE — POST SEDATION
[address] [address]  Sedation/Analgesia Post Sedation Record    Pt Name: Cyn Greco  MRN: [id number]  YOB: 1965  Procedure Performed By: Miguel Steward DO  Primary Care Physician: Brissa Green MD    POST-PROCEDURE    Physicians/Assistants: Miguel Steward DO  Procedure Performed: See Procedure Note   Sedation/Anesthesia: Versed and Fentanyl (See procedure note for amount and duration)  Estimated Blood Loss:     0  ml  Specimens Removed: None        Complications: None           Marty Caldwell DO  Electronically signed 3/9/2023 at 11:30 AM

## 2023-03-09 NOTE — PRE SEDATION
[facility]  Pre-Sedation/Analgesia History & Physical    Pt Name: Claudia Smith  MRN: [id number]  YOB: 1965  Provider Performing Procedure: Hardik Verdin DO   Primary Care Physician: Trae Swartz MD      MEDICAL HISTORY       has a past medical history of Anxiety, Depression, Diabetes ([facility] [address] Hyperlipidemia, Hypertension, and Seizures ([facility] [address] SURGICAL HISTORY   has a past surgical history that includes Leg Surgery; Knee arthroscopy (Bilateral); Toe amputation (Bilateral); Pain management procedure (Bilateral, 12/14/2021); Pain management procedure (Bilateral, 1/25/2022); Pain management procedure (Right, 2/16/2022); Pain management procedure (Left, 4/6/2022); Pain management procedure (Right, 1/24/2023); Pain management procedure (Right, 2/9/2023); and Pain management procedure (Bilateral, 2/21/2023). ALLERGIES   Allergies as of 03/02/2023    (No Known Allergies)       MEDICATIONS   Prior to Admission medications    Medication Sig Start Date End Date Taking? Authorizing Provider   HYDROcodone-acetaminophen (NORCO) 5-325 MG per tablet Take 1 tablet by mouth 2 times daily as needed for Pain for up to 30 days. 2/21/23 3/23/23  AVRIL Hubbard - CNP   Insulin Glargine-Lixisenatide (SOLIQUA) 100-33 UNT-MCG/ML SOPN Inject into the skin    Historical Provider, MD   metFORMIN (GLUCOPHAGE) 500 MG tablet Take 500 mg by mouth daily (with breakfast)    Historical Provider, MD   atorvastatin (LIPITOR) 20 MG tablet Take 20 mg by mouth daily 3/2/22   Historical Provider, MD   baclofen (LIORESAL) 20 MG tablet  3/2/22   Historical Provider, MD   lisinopril (PRINIVIL;ZESTRIL) 10 MG tablet Take 10 mg by mouth daily    Historical Provider, MD   pregabalin (LYRICA) 75 MG capsule Take 1 capsule by mouth 3 times daily for 30 days. Patient taking differently: Take 100 mg by mouth 4 times daily.  2/28/21 3/9/23  Mele Simeon MD   glimepiride (AMARYL) 4 MG tablet Take 6 mg by mouth every morning    Historical Provider, MD   aspirin 81 MG chewable tablet Take 81 mg by mouth daily  Patient not taking: Reported on 3/9/2023    Historical Provider, MD     PHYSICAL:   Vitals:    03/09/23 0944   BP: 137/72   Pulse: 88   Resp: 16   Temp: 97.2 °F (36.2 °C)   SpO2: 96%     PLANNED PROCEDURE   See procedure note  SEDATION  Planned agent: Versed and Fentanyl  ASA Classification: 2  Class 1: A normal healthy patient  Class 2: Pt with mild to moderate systemic disease  Class 3: Severe systemic disease or disturbance  Class 4: Severe systemic disorders that are already life threatening. Class 5: Moribund pt with little chances of survival, for more than 24 hours. Mallampati I Airway Classification: 2    1. Pre-procedure diagnostic studies complete and results available. 2. Previous sedation/anesthesia experiences assessed. 3. The patient is an appropriate candidate to undergo the planned procedure sedation and anesthesia. (Refer to nursing sedation/analgesia documentation record)  4. Formulation and discussion of sedation/procedure plan, risks, and expectations with patient and/or responsible adult completed. 5. Patient examined immediately prior to the procedure.  (Refer to nursing sedation/analgesia documentation record)    Day Alves DO  Electronically signed 3/9/2023 at 11:30 AM

## 2023-03-21 ENCOUNTER — OFFICE VISIT (OUTPATIENT)
Dept: PHYSICAL MEDICINE AND REHAB | Age: 58
End: 2023-03-21

## 2023-03-21 VITALS
WEIGHT: 278 LBS | HEIGHT: 73 IN | BODY MASS INDEX: 36.84 KG/M2 | DIASTOLIC BLOOD PRESSURE: 102 MMHG | SYSTOLIC BLOOD PRESSURE: 150 MMHG

## 2023-03-21 DIAGNOSIS — G89.4 CHRONIC PAIN SYNDROME: ICD-10-CM

## 2023-03-21 DIAGNOSIS — M48.56XA PATHOLOGICAL COMPRESSION FRACTURE OF LUMBAR VERTEBRA, INITIAL ENCOUNTER (HCC): ICD-10-CM

## 2023-03-21 DIAGNOSIS — G89.29 OTHER CHRONIC PAIN: ICD-10-CM

## 2023-03-21 DIAGNOSIS — M47.816 LUMBAR SPONDYLOSIS: Primary | ICD-10-CM

## 2023-03-21 DIAGNOSIS — M47.816 LUMBAR FACET ARTHROPATHY: ICD-10-CM

## 2023-03-21 DIAGNOSIS — M17.0 PRIMARY OSTEOARTHRITIS OF BOTH KNEES: ICD-10-CM

## 2023-03-21 RX ORDER — HYDROCODONE BITARTRATE AND ACETAMINOPHEN 5; 325 MG/1; MG/1
1 TABLET ORAL 2 TIMES DAILY PRN
Qty: 60 TABLET | Refills: 0 | Status: SHIPPED | OUTPATIENT
Start: 2023-03-23 | End: 2023-04-22

## 2023-03-21 NOTE — PROGRESS NOTES
Chronic Pain/PM&R Clinic Note     Encounter Date: 3/21/23    Subjective:   Chief Complaint:   Chief Complaint   Patient presents with    Follow Up After Procedure     medial branch blocks  bilateral Lumbar 4/5 and 5/Sacral 1  3/9/23       History of Present Illness:   Richa Carney is a 65 y.o. male seen in the clinic initially on 10/20/21 for his history of chronic knee pain. He has a medical history of anxiety/depression and T2DM is s/p multiple toe amputations. He has longstanding history of bilateral knee pain for the last several years. He endorses the majority of pain to be on the medial and lateral aspect of the knees at the joint line. He states that his knee pain is a constant 5/10 aching, stabbing pain. His knee pain is worse with any activities where he is trying to go up and down stairs and really has a difficult time being on his legs for an extended duration of time. He denies any further radiation down his legs. He does have some low back pain was found to have an L1 compression fracture several months ago and states that he did not undergo a kyphoplasty but is wondering how this can be addressed. He describes the majority of his back pain to be axial in nature slightly above his waistline. This pain is described as a constant aching, nagging, stabbing pain that he rates as a constant 6/10 pain. He denies any pain that radiates further down his legs, associated leg weakness, leg paresthesias, saddle anesthesia, bowel/bladder incontinence. Today, 03/21/2023, for planned follow-up on low back pain. He underwent the confirmatory lumbar facet medial branch block bilateral L4-5 and L5-S1 on 3/9/2023. He did notice significant improvement with standing for an extended period of time for about 1 day after the procedure. To proceed with a lumbar radiofrequency ablation. After completion of the low back he would like to look into options for his left knee.   He feels his right knee is going

## 2023-03-22 NOTE — H&P
Today, patient presents for planned lumbar radiofrequency at bilateral L4/5 and L5/S1    This note is reflective of the patient's previous visit for evaluation. We will proceed with today's planned procedure. Since patient's last visit for evaluation, there have been no interval changes in medical history. Patient has no new numbness, weakness, or focal neurological deficit since evaluation. Patient has no contraindications to injection (no anticoagulation or recent antibiotic intake for active infections), and has a  present or is able to drive themselves (as discussed and cleared by physician). Allergies to latex, contrast dye, and steroid medications have been confirmed with the patient prior to the procedure. NPO necessity has been assessed and accepted based on procedure complexity. The risks and benefits of the procedure have been explained including but are not limited to infection, bleeding, paralysis, immediate post procedure weakness, and dizziness; the patient acknowledges understanding and desires to proceed with the procedure. Patient has signed consent for same procedure as discussed in previous clinic encounter. All other questions and concerns were addressed at bedside. See procedure note for full details. Post procedure Instructions: The patient was advised not to drive during the day of the procedure and not to engage in any significant decision making (unless otherwise states by physician). The patient was also advised to be cautious with walking/activity for 24 hours following today's visit and asked not to engage in over-exertion (unless otherwise states by physician). After this time, it is ok to resume pre-procedure level of activity. Patient advised to apply ice to site of injection in situations of pain and discomfort. Patient advised to not submerge site of injection during bath or pool activities for approximately 24 hours post-procedure.  Patient attested to understanding

## 2023-03-23 ENCOUNTER — HOSPITAL ENCOUNTER (OUTPATIENT)
Age: 58
Setting detail: OUTPATIENT SURGERY
Discharge: HOME | End: 2023-03-23
Attending: ANESTHESIOLOGY | Admitting: ANESTHESIOLOGY
Payer: MEDICARE

## 2023-03-23 ENCOUNTER — APPOINTMENT (OUTPATIENT)
Dept: GENERAL RADIOLOGY | Age: 58
End: 2023-03-23
Attending: ANESTHESIOLOGY
Payer: MEDICARE

## 2023-03-23 VITALS
TEMPERATURE: 97.9 F | OXYGEN SATURATION: 95 % | DIASTOLIC BLOOD PRESSURE: 76 MMHG | HEART RATE: 80 BPM | SYSTOLIC BLOOD PRESSURE: 144 MMHG | HEIGHT: 73 IN | BODY MASS INDEX: 36.58 KG/M2 | WEIGHT: 276 LBS | RESPIRATION RATE: 16 BRPM

## 2023-03-23 LAB — GLUCOSE BLD STRIP.AUTO-MCNC: 84 MG/DL (ref 70–108)

## 2023-03-23 PROCEDURE — 99153 MOD SED SAME PHYS/QHP EA: CPT | Performed by: ANESTHESIOLOGY

## 2023-03-23 PROCEDURE — 7100000010 HC PHASE II RECOVERY - FIRST 15 MIN: Performed by: ANESTHESIOLOGY

## 2023-03-23 PROCEDURE — 6360000002 HC RX W HCPCS: Performed by: ANESTHESIOLOGY

## 2023-03-23 PROCEDURE — 3600000058 HC PAIN LEVEL 5 BASE: Performed by: ANESTHESIOLOGY

## 2023-03-23 PROCEDURE — 99152 MOD SED SAME PHYS/QHP 5/>YRS: CPT | Performed by: ANESTHESIOLOGY

## 2023-03-23 PROCEDURE — 3600000059 HC PAIN LEVEL 5 ADDL 15 MIN: Performed by: ANESTHESIOLOGY

## 2023-03-23 PROCEDURE — 2709999900 HC NON-CHARGEABLE SUPPLY: Performed by: ANESTHESIOLOGY

## 2023-03-23 PROCEDURE — 3209999900 FLUORO FOR SURGICAL PROCEDURES

## 2023-03-23 PROCEDURE — 82948 REAGENT STRIP/BLOOD GLUCOSE: CPT

## 2023-03-23 PROCEDURE — 2500000003 HC RX 250 WO HCPCS: Performed by: ANESTHESIOLOGY

## 2023-03-23 RX ORDER — MIDAZOLAM HYDROCHLORIDE 1 MG/ML
INJECTION INTRAMUSCULAR; INTRAVENOUS PRN
Status: DISCONTINUED | OUTPATIENT
Start: 2023-03-23 | End: 2023-03-23 | Stop reason: ALTCHOICE

## 2023-03-23 RX ORDER — LIDOCAINE HYDROCHLORIDE 10 MG/ML
INJECTION, SOLUTION EPIDURAL; INFILTRATION; INTRACAUDAL; PERINEURAL PRN
Status: DISCONTINUED | OUTPATIENT
Start: 2023-03-23 | End: 2023-03-23 | Stop reason: ALTCHOICE

## 2023-03-23 RX ORDER — LIDOCAINE HYDROCHLORIDE 20 MG/ML
INJECTION, SOLUTION EPIDURAL; INFILTRATION; INTRACAUDAL; PERINEURAL PRN
Status: DISCONTINUED | OUTPATIENT
Start: 2023-03-23 | End: 2023-03-23 | Stop reason: ALTCHOICE

## 2023-03-23 RX ORDER — FENTANYL CITRATE 50 UG/ML
INJECTION, SOLUTION INTRAMUSCULAR; INTRAVENOUS PRN
Status: DISCONTINUED | OUTPATIENT
Start: 2023-03-23 | End: 2023-03-23 | Stop reason: ALTCHOICE

## 2023-03-23 ASSESSMENT — PAIN - FUNCTIONAL ASSESSMENT: PAIN_FUNCTIONAL_ASSESSMENT: 0-10

## 2023-03-23 ASSESSMENT — PAIN DESCRIPTION - ORIENTATION: ORIENTATION: LOWER

## 2023-03-23 ASSESSMENT — PAIN SCALES - GENERAL: PAINLEVEL_OUTOF10: 3

## 2023-03-23 ASSESSMENT — PAIN DESCRIPTION - LOCATION: LOCATION: BACK

## 2023-03-23 ASSESSMENT — PAIN DESCRIPTION - DESCRIPTORS: DESCRIPTORS: ACHING

## 2023-03-23 NOTE — PROGRESS NOTES
0827 Pt to phase 2 recovery per cart. Pt awake and alert. Report from [employer]. Pt states back pain 3/10 on scale. No bleeding noted from injection sites. Pox 95% on room air. 0830 Pt taking offered snack.

## 2023-03-23 NOTE — PROCEDURES
Pre-operative Diagnosis: Lumbar facet pain     Post-operative Diagnosis: Lumbar facet pain     Procedure: Bilateral lumbar thermal radiofrequency ablation targeting facet joints L4/L5 and L5/S1    Procedure Description:  After consent was obtained, the patient was placed in the prone position with a pillow under the abdomen to reduce the lordotic curve of the lumbar spine. The lower back was prepped with chloraprep and draped in a sterile fashion. Then, 0.5 cc of 1 % lidocaine was used for local anesthesia of the skin and superficial subcutaneous tissues. Three 20-gauge 100mm SMK cannulas with 10-mm active tips were advanced under fluoroscopic guidance in an AP view to the junction of the superior articular process and the transverse process of the L4 and L5 vertebra and at the sacral ala. There were no paresthesias, heme or CSF obtained. Needle placement was confirmed using AP and oblique views. This procedure was repeated on the contralateral side. Sensory and motor stimulation at 50Hz and 2Hz and impedance measurements were carried out having reached threshold at:     RIGHT  L4: 0.2V/3V/150-300 Ohms  L5: 0.2V/3V/150-300 Ohms  SA: 0.2V/3V/150-300 Ohms     LEFT  L4: 0.2V/3V/150-300 Ohms  L5: 0.2V/3V/150-300 Ohms  SA: 0.2V/3V/150-300 Ohms        Then, 1cc of 2% Lidocaine was injected at the site. Temperature was then raised to 80 degrees centigrade for 90 seconds with a 15 second temperature ramp. No pain was reported during the lesioning. The needles were then withdrawn without complications. The patient tolerated the procedure well. The patient was transported to the recovery room and discharged in ambulatory fashion.     Procedural Complications: None  Estimated Blood Loss: 0 mL    IV sedation was used during the procedure:  - Moderate intravenous conscious sedation was supervised by Dr. Génesis Lerma  - The patient was independently monitored by a Registered Nurse assigned to the procedure room  - Monitoring

## 2023-03-23 NOTE — PROGRESS NOTES
Discharge teaching provided, pt voiced understanding. Call light given to pt and instructed to call for help.

## 2023-03-23 NOTE — PRE SEDATION
[facility]  Pre-Sedation/Analgesia History & Physical    Pt Name: Jolie Goodwin  MRN: [id number]  YOB: 1965  Provider Performing Procedure: Jewels David DO   Primary Care Physician: Wm Vences MD      MEDICAL HISTORY       has a past medical history of Anxiety, Depression, Diabetes ([facility] [address] Hyperlipidemia, Hypertension, and Seizures ([facility] [address] SURGICAL HISTORY   has a past surgical history that includes Leg Surgery; Knee arthroscopy (Bilateral); Toe amputation (Bilateral); Pain management procedure (Bilateral, 12/14/2021); Pain management procedure (Bilateral, 1/25/2022); Pain management procedure (Right, 2/16/2022); Pain management procedure (Left, 4/6/2022); Pain management procedure (Right, 1/24/2023); Pain management procedure (Right, 2/9/2023); Pain management procedure (Bilateral, 2/21/2023); and Pain management procedure (Bilateral, 3/9/2023). ALLERGIES   Allergies as of 03/21/2023    (No Known Allergies)       MEDICATIONS   Prior to Admission medications    Medication Sig Start Date End Date Taking? Authorizing Provider   HYDROcodone-acetaminophen (NORCO) 5-325 MG per tablet Take 1 tablet by mouth 2 times daily as needed for Pain for up to 30 days. 3/23/23 4/22/23  AVRIL Lam - CNP   Insulin Glargine-Lixisenatide (SOLIQUA) 100-33 UNT-MCG/ML SOPN Inject into the skin    Historical Provider, MD   metFORMIN (GLUCOPHAGE) 500 MG tablet Take 500 mg by mouth daily (with breakfast)  Patient not taking: Reported on 3/23/2023    Historical Provider, MD   atorvastatin (LIPITOR) 20 MG tablet Take 20 mg by mouth daily 3/2/22   Historical Provider, MD   baclofen (LIORESAL) 20 MG tablet  3/2/22   Historical Provider, MD   lisinopril (PRINIVIL;ZESTRIL) 10 MG tablet Take 10 mg by mouth daily    Historical Provider, MD   pregabalin (LYRICA) 75 MG capsule Take 1 capsule by mouth 3 times daily for 30 days. Patient taking differently: Take 100 mg by mouth 4 times daily.  2/28/21

## 2023-03-23 NOTE — POST SEDATION
[facility]  Sedation/Analgesia Post Sedation Record    Pt Name: Matt Gallo  MRN: [id number]  YOB: 1965  Procedure Performed By: Lyudmila Whiting DO  Primary Care Physician: Acacia Holly MD    POST-PROCEDURE    Physicians/Assistants: Lyudmila Whiting DO  Procedure Performed: See Procedure Note   Sedation/Anesthesia: Versed and Fentanyl (See procedure note for amount and duration)  Estimated Blood Loss:     0  ml  Specimens Removed: None        Complications: None           Marty Burt DO  Electronically signed 3/23/2023 at 10:13 AM

## 2023-03-23 NOTE — DISCHARGE INSTRUCTIONS
Post procedure Instructions:    No driving or making significant decisions for the remainder of the day. Be cautions with walking and activity for 24 hours, do not over exert yourself. Ok to resume pre-procedure activity level today. Apply ice to site of injection site if you have pain or discomfort. Do not submerge sit of injection during bath or pool activities for 48 hours post-procedure. Resume blood thinning medications in 24 hours. Call office [phone] if you have:  Temperature greater than 100.4  Persistent nausea and vomiting  Severe uncontrolled pain  Redness, tenderness, or signs of infection (pain, swelling, redness, odor or green/yellow discharge around the site)  Difficulty breathing, headache or visual disturbances  Hives  Persistent dizziness or light-headedness  Extreme fatigue  Any other questions or concerns you may have after discharge    In an emergency, call 911 or go to an Emergency Department at a nearby hospital    Surgical Site Infections      How can we work together to prevent Surgical Site Infections? We would like to thank you for choosing [facility] for your Surgical Care. Below you will find helpful information on how we can work together to prevent Surgical Site Infections. What is a Surgical Site Infection (SSI)? A surgical site infection is an infection that occurs after surgery in the part of the body where the surgery took place. Most patients who have surgery do not develop an infection. However, infections develop in about 1 to 3 out of every 100 patients who have surgery. Some of the common symptoms of a surgical site infection are:  Redness and pain around the area where you had surgery  Drainage of cloudy fluid from your surgical wound  Fever    Can SSIs be treated? Yes. Most surgical site infections can be treated with antibiotics. The antibiotic given to you depends on the bacteria (germs) causing the infection.  Sometimes patients with

## 2023-03-23 NOTE — PROGRESS NOTES
5277 Pt dressing on cart - states \"ready to go\". 7121 Pt discharged ambulatory with staff. Pt alert and stable. Gait steady.

## 2023-04-06 NOTE — ED PROVIDER NOTES
Preventive Health Recommendations  Female Ages 21 to 25     Yearly exam:     See your health care provider every year in order to  o Review health changes.   o Discuss preventive care.    o Review your medicines if your doctor has prescribed any.      You should be tested each year for STDs (sexually transmitted diseases).       Talk to your provider about how often you should have cholesterol testing.      Get a Pap test every three years. If you have an abnormal result, your doctor may have you test more often.      If you are at risk for diabetes, you should have a diabetes test (fasting glucose).     Shots:     Get a flu shot each year.     Get a tetanus shot every 10 years.     Consider getting the shot (vaccine) that prevents cervical cancer (Gardasil).    Nutrition:     Eat at least 5 servings of fruits and vegetables each day.    Eat whole-grain bread, whole-wheat pasta and brown rice instead of white grains and rice.    Get adequate Calcium and Vitamin D.     Lifestyle    Exercise at least 150 minutes a week each week (30 minutes a day, 5 days a week). This will help you control your weight and prevent disease.    Limit alcohol to one drink per day.    No smoking.     Wear sunscreen to prevent skin cancer.    See your dentist every six months for an exam and cleaning.   [place] ENCOUNTER          Pt Name: Reta Anderson  MRN: [id number]  [place] 1965  Date of evaluation: 3/4/2021  Treating Resident Physician: Olga Issa DO  Supervising Physician: Joe Petersen       Chief Complaint   Patient presents with    Back Pain     History obtained from the patient. HISTORY OF PRESENT ILLNESS    HPI  Reta Anderson is a 48 y.o. male who presents to the emergency department for evaluation of 1 year and 2 months of chronic low back pain. Patient says he had difficulty sleeping yesterday secondary to the pain. Describes it as a band across his lower back with tightness. Associated intermittent radiation down both legs. Denies any weakness, difficulty walking, urinary retention. Patient with history of alcohol use and is in rehab. Unable to provide significant pain medications, but asking for resources. The patient has no other acute complaints at this time. REVIEW OF SYSTEMS   Review of Systems   Constitutional: Negative for activity change, chills and fever. HENT: Negative for ear discharge, ear pain, nosebleeds, postnasal drip, rhinorrhea, sinus pressure, sinus pain, sore throat and trouble swallowing. Eyes: Negative for pain, discharge and visual disturbance. Respiratory: Negative for cough, chest tightness, shortness of breath and wheezing. Cardiovascular: Negative for chest pain and palpitations. Gastrointestinal: Negative for abdominal pain, constipation, diarrhea and vomiting. Endocrine: Negative for cold intolerance and heat intolerance. Genitourinary: Negative for decreased urine volume, difficulty urinating, dysuria, flank pain and urgency. Musculoskeletal: Positive for arthralgias, back pain and myalgias. Negative for neck pain and neck stiffness. Skin: Negative for color change and rash.    Neurological: Negative for dizziness, weakness, light-headedness, numbness and headaches. PAST MEDICAL AND SURGICAL HISTORY     Past Medical History:   Diagnosis Date    Seizures ([facility] [address]      No past surgical history on file. MEDICATIONS     Current Facility-Administered Medications:     lidocaine 4 % external patch 1 patch, 1 patch, Transdermal, Once, Jason Alejandro DO, 1 patch at 03/04/21 1929    Current Outpatient Medications:     levETIRAcetam (KEPPRA) 500 MG tablet, Take 1 tablet by mouth 2 times daily, Disp: 90 tablet, Rfl: 0    pregabalin (LYRICA) 75 MG capsule, Take 1 capsule by mouth 3 times daily for 30 days. , Disp: 90 capsule, Rfl: 0    terbinafine (LAMISIL) 250 MG tablet, Take 1 tablet by mouth daily, Disp: 39 tablet, Rfl: 0    amLODIPine (NORVASC) 5 MG tablet, Take 1 tablet by mouth daily, Disp: 30 tablet, Rfl: 0    ketoconazole (NIZORAL) 2 % shampoo, Apply to wet hair/scalp every 3 days for 2 weeks, Disp: 1 Bottle, Rfl: 0    amoxicillin-clavulanate (AUGMENTIN) 875-125 MG per tablet, Take 1 tablet by mouth 2 times daily for 14 days, Disp: 28 tablet, Rfl: 0    lisinopril (PRINIVIL;ZESTRIL) 40 MG tablet, Take 1 tablet by mouth daily, Disp: 30 tablet, Rfl: 0    glimepiride (AMARYL) 4 MG tablet, Take 6 mg by mouth every morning, Disp: , Rfl:     insulin lispro (HUMALOG) 100 UNIT/ML injection vial, Inject 0-15 Units into the skin 3 times daily (before meals), Disp: , Rfl:     NONFORMULARY, Apply 1-2 g topically 4 times daily [id number]/BACLO2/DICLO3/GABA6/LIDO2/PRILO2% Grams cream, Disp: , Rfl:     aspirin 81 MG chewable tablet, Take 81 mg by mouth daily, Disp: , Rfl:     DULoxetine (CYMBALTA) 60 MG extended release capsule, Take 60 mg by mouth daily, Disp: , Rfl:     buprenorphine (BUTRANS) 10 MCG/HR PTWK, Place 1 patch onto the skin once a week., Disp: , Rfl:       SOCIAL HISTORY     Social History     Social History Narrative    Not on file     Social History     Tobacco Use    Smoking status: Former Smoker     Types: Cigarettes    Smokeless tobacco: Never Used   Substance Use Topics    Alcohol use: Not Currently    Drug use: Not Currently         ALLERGIES   No Known Allergies      FAMILY HISTORY   No family history on file. PREVIOUS RECORDS   Previous records reviewed: Patient last seen in this emergency department last month for altered mental status. Gem [place] PHYSICAL EXAM     ED Triage Vitals [03/04/21 1906]   BP Temp Temp src Pulse Resp SpO2 Height Weight   (!) 131/95 98.2 °F (36.8 °C) -- 108 15 99 % 6' (1.829 m) 265 lb (120.2 kg)     Initial vital signs and nursing assessment reviewed and Normal except tachycardia. . Body mass index is 35.94 kg/m². Pulsoximetry is normal per my interpretation. Additional Vital Signs:  Vitals:    03/04/21 1906   BP: (!) 131/95   Pulse: 108   Resp: 15   Temp: 98.2 °F (36.8 °C)   SpO2: 99%       Physical Exam  Constitutional:       General: He is not in acute distress. Appearance: Normal appearance. He is not ill-appearing or diaphoretic. HENT:      Head: Normocephalic and atraumatic. Mouth/Throat:      Mouth: Mucous membranes are moist.      Pharynx: Oropharynx is clear. No oropharyngeal exudate or posterior oropharyngeal erythema. Eyes:      Extraocular Movements: Extraocular movements intact. Conjunctiva/sclera: Conjunctivae normal.      Pupils: Pupils are equal, round, and reactive to light. Neck:      Musculoskeletal: Normal range of motion. No neck rigidity or muscular tenderness. Cardiovascular:      Rate and Rhythm: Normal rate and regular rhythm. Pulses: Normal pulses. Heart sounds: Normal heart sounds. No murmur. No friction rub. No gallop. Pulmonary:      Effort: Pulmonary effort is normal.      Breath sounds: Normal breath sounds. No wheezing, rhonchi or rales. Abdominal:      Palpations: Abdomen is soft. Tenderness: There is no abdominal tenderness. There is no guarding or rebound. Musculoskeletal: Normal range of motion.          General: Tenderness present. Right lower leg: No edema. Left lower leg: No edema. Comments: Negative straight leg raise bilaterally. Palpable tenderness of the paraspinals lumbar area. Skin:     General: Skin is warm and dry. Capillary Refill: Capillary refill takes less than 2 seconds. Findings: No bruising, erythema or lesion. Neurological:      General: No focal deficit present. Mental Status: He is alert and oriented to person, place, and time. Cranial Nerves: No cranial nerve deficit. Sensory: No sensory deficit. Motor: No weakness. Coordination: Coordination normal.      Gait: Gait normal.         MEDICAL DECISION MAKING   Initial Assessment:   1. Well-appearing 50-year-old male chronic back pain. Acute exacerbation. Patient with history of substance use. Does not want narcotics, but instead asking for resources and symptomatic control temporarily. Neurovascular intact without red flag symptoms. Plan:   Attempt control patient's pain with Toradol, lidocaine patch. Patient denied any creams. Provided him with medications through our behavioral health unit for follow-up regarding his addiction and pain management. Patient very thankful for help and agreeable to follow-up with the resources provided including his PCP. Discussed strict return precautions that include focal neurologic deficit, severe weakness, difficulty breathing, intolerable pain. He is agreeable to this plan and discharge home.       ED RESULTS   Laboratory results:  Labs Reviewed - No data to display    Radiologic studies results:  No orders to display       ED Medications administered this visit:   Medications   lidocaine 4 % external patch 1 patch (1 patch Transdermal Patch Applied 3/4/21 1929)   ketorolac (TORADOL) injection 30 mg (30 mg Intramuscular Given 3/4/21 1929)         ED COURSE          Strict return precautions and follow up instructions were discussed with the patient prior

## 2023-04-07 ENCOUNTER — OFFICE VISIT (OUTPATIENT)
Dept: PHYSICAL MEDICINE AND REHAB | Age: 58
End: 2023-04-07

## 2023-04-07 VITALS
SYSTOLIC BLOOD PRESSURE: 150 MMHG | WEIGHT: 276 LBS | DIASTOLIC BLOOD PRESSURE: 82 MMHG | HEIGHT: 73 IN | BODY MASS INDEX: 36.58 KG/M2

## 2023-04-07 DIAGNOSIS — M47.816 LUMBAR SPONDYLOSIS: Primary | ICD-10-CM

## 2023-04-07 DIAGNOSIS — M48.56XA PATHOLOGICAL COMPRESSION FRACTURE OF LUMBAR VERTEBRA, INITIAL ENCOUNTER (HCC): ICD-10-CM

## 2023-04-07 DIAGNOSIS — G89.29 OTHER CHRONIC PAIN: ICD-10-CM

## 2023-04-07 DIAGNOSIS — M17.0 PRIMARY OSTEOARTHRITIS OF BOTH KNEES: ICD-10-CM

## 2023-04-07 DIAGNOSIS — M48.061 SPINAL STENOSIS OF LUMBAR REGION WITHOUT NEUROGENIC CLAUDICATION: ICD-10-CM

## 2023-04-07 RX ORDER — HYDROCODONE BITARTRATE AND ACETAMINOPHEN 5; 325 MG/1; MG/1
1 TABLET ORAL 2 TIMES DAILY PRN
Qty: 60 TABLET | Refills: 0 | Status: SHIPPED | OUTPATIENT
Start: 2023-04-22 | End: 2023-05-22

## 2023-04-07 RX ORDER — TRIAMCINOLONE ACETONIDE 40 MG/ML
80 INJECTION, SUSPENSION INTRA-ARTICULAR; INTRAMUSCULAR ONCE
Status: COMPLETED | OUTPATIENT
Start: 2023-04-07 | End: 2023-04-07

## 2023-04-07 RX ADMIN — TRIAMCINOLONE ACETONIDE 80 MG: 40 INJECTION, SUSPENSION INTRA-ARTICULAR; INTRAMUSCULAR at 12:46

## 2023-04-07 NOTE — PROGRESS NOTES
Chronic Pain/PM&R Clinic Note     Encounter Date: 4/7/23    Subjective:   Chief Complaint:   Chief Complaint   Patient presents with    Follow-up     PROCEDURE        History of Present Illness:   Eliza Gee is a 65 y.o. male seen in the clinic initially on 10/20/21 for his history of chronic knee pain. He has a medical history of anxiety/depression and T2DM is s/p multiple toe amputations. He has longstanding history of bilateral knee pain for the last several years. He endorses the majority of pain to be on the medial and lateral aspect of the knees at the joint line. He states that his knee pain is a constant 5/10 aching, stabbing pain. His knee pain is worse with any activities where he is trying to go up and down stairs and really has a difficult time being on his legs for an extended duration of time. He denies any further radiation down his legs. He does have some low back pain was found to have an L1 compression fracture several months ago and states that he did not undergo a kyphoplasty but is wondering how this can be addressed. He describes the majority of his back pain to be axial in nature slightly above his waistline. This pain is described as a constant aching, nagging, stabbing pain that he rates as a constant 6/10 pain. He denies any pain that radiates further down his legs, associated leg weakness, leg paresthesias, saddle anesthesia, bowel/bladder incontinence. Today, 04/07/2023, patient presents for planned follow up on chronic pain. He feels he may have obtained 25-30% relief from the lumbar radiofrequency ablation that was completed on 03/23/2023. 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 He has not fully been able to evaluate his response since it has only been two weeks and he has not been overly active. He states his knees continue to bother him. He states they feels \"full\".  He states he has had them drained in the past which

## 2023-04-07 NOTE — PROGRESS NOTES
Knee Arthrocentesis with Injection Procedure Note    Pre-operative Diagnosis: bilateral knee pain with osteoarthritis    Post-operative Diagnosis: same    Indications: Symptom relief from osteoarthritis    Anesthesia: not required     Procedure Details     Verbal consent was obtained for the procedure. The left knee joint was prepped with an alcohol swab. A 22 gauge needle was inserted into the superior aspect of the joint from a lateral approach. 0 ml of fluid was removed from the joint. 4 ml 0.25% bupivocaine and 1 ml of triamcinolone (KENALOG) 40mg/ml was then injected into the joint through the same needle. The needle was removed and the area cleansed and dressed. This exactly procedure was repeat on the contralateral side. Complications:  None; patient tolerated the procedure well.

## 2023-04-19 ENCOUNTER — TELEPHONE (OUTPATIENT)
Dept: PHYSICAL MEDICINE AND REHAB | Age: 58
End: 2023-04-19

## 2023-04-19 DIAGNOSIS — M47.816 LUMBAR SPONDYLOSIS: ICD-10-CM

## 2023-04-19 DIAGNOSIS — G89.29 OTHER CHRONIC PAIN: ICD-10-CM

## 2023-04-20 RX ORDER — HYDROCODONE BITARTRATE AND ACETAMINOPHEN 5; 325 MG/1; MG/1
1 TABLET ORAL 2 TIMES DAILY PRN
Qty: 60 TABLET | Refills: 0 | Status: SHIPPED | OUTPATIENT
Start: 2023-04-22 | End: 2023-05-04 | Stop reason: SDUPTHER

## 2023-05-04 ENCOUNTER — OFFICE VISIT (OUTPATIENT)
Dept: PHYSICAL MEDICINE AND REHAB | Age: 58
End: 2023-05-04

## 2023-05-04 VITALS — HEART RATE: 75 BPM | DIASTOLIC BLOOD PRESSURE: 86 MMHG | SYSTOLIC BLOOD PRESSURE: 140 MMHG | OXYGEN SATURATION: 96 %

## 2023-05-04 DIAGNOSIS — M17.12 OSTEOARTHRITIS OF LEFT KNEE, UNSPECIFIED OSTEOARTHRITIS TYPE: Primary | ICD-10-CM

## 2023-05-04 DIAGNOSIS — M48.56XA PATHOLOGICAL COMPRESSION FRACTURE OF LUMBAR VERTEBRA, INITIAL ENCOUNTER (HCC): ICD-10-CM

## 2023-05-04 DIAGNOSIS — M47.816 LUMBAR SPONDYLOSIS: ICD-10-CM

## 2023-05-04 DIAGNOSIS — G89.29 OTHER CHRONIC PAIN: ICD-10-CM

## 2023-05-04 DIAGNOSIS — Z86.39 HISTORY OF DIABETIC NEUROPATHY: ICD-10-CM

## 2023-05-04 RX ORDER — HYDROCODONE BITARTRATE AND ACETAMINOPHEN 5; 325 MG/1; MG/1
1 TABLET ORAL 2 TIMES DAILY PRN
Qty: 60 TABLET | Refills: 0 | Status: SHIPPED | OUTPATIENT
Start: 2023-05-22 | End: 2023-06-21

## 2023-05-04 NOTE — PROGRESS NOTES
not have a significant response to the knee injections. I have set him up for the left genicular nerve block with Dr. Trinity May. I have continued his Norco for breakthrough pain relief while he continues to work with injections. Plan: The following treatment recommendations and plan were discussed in detail with Nallely Chavez. Imaging:   No imaging reviewed    Analgesics:  Due to acute exacerbation of chronic low back pain due to his right toe amputations and change in gait, I have continued the patient on low-dose Norco 5 mg to take up to twice a day for severe pain (pain greater than 7/10). We will utilize this until we can complete better injection therapy for his knee and back pain. Patient is advised take this medication as prescribed otherwise taking more than prescribed can lead development tolerance, physical dependence, addiction. OARRS reviewed and is appropriate. Pain contract signed. Interventions: For his continued chronic right knee pain secondary to osteoarthritis, I set the patient up for left genicular nerve block under fluoroscopic guidance. The risk of the procedure discussed in detail the patient. Patient was proceed with the injection. Anticoagulation/NPO Recommendations:   Patient does not need to hold any medications prior to the procedure. Patient does not not need to be NPO prior to the procedure. Plan to do a LOCAL injection     Multidisciplinary Pain Management:   In the presence of complex, chronic, and multi-factorial pain, the importance of a multidisciplinary approach to pain management in the patients management regimen was emphasized and discussed in great detail.      Referrals:  None    Prescriptions Written This Visit:   Norco 5-325 mg (#60, 0 refills)    Follow-up: Left genicular nerve block, in office 1 week after      Bolivar Buerger, APRN - CNP  Interventional Pain Management/PM&R   [place]

## 2023-05-05 ENCOUNTER — PREP FOR PROCEDURE (OUTPATIENT)
Dept: PHYSICAL MEDICINE AND REHAB | Age: 58
End: 2023-05-05

## 2023-05-08 NOTE — DISCHARGE INSTRUCTIONS
SSIs also need another surgery to treat the infection. What are some of the things that hospitals are doing to prevent SSIs? To prevent SSIs, doctors, nurses, and other healthcare providers: May remove some of your hair immediately before your surgery using electric clippers if the hair is in the same area where the procedure will occur. They should not shave you with a razor. Give you antibiotics before your surgery starts. In most cases, you should get antibiotics within 60 minutes before the surgery starts and the antibiotics should be stopped within 24 hours after surgery. Clean the skin at the site of your surgery with a special soap that kills germs. Clean their hands and arms up to their elbows with an antiseptic agent just before the surgery. Wear special hair covers, masks, gowns, and gloves during surgery to  keep the surgery area clean. Clean their hands with soap and water or an alcohol-based hand rub before and after caring for each patient. If you do not see your providers clean their hands, please     ask  them to do so. What can I do to help prevent SSIs? Before your surgery:  Tell your doctor about other medical problems you may have. Health problems such as allergies, diabetes, and obesity could affect your surgery and your treatment. Quit smoking. Patients who smoke get more infections. Talk to your doctor about how you can quit before your surgery. Do not shave near where you will have surgery. Shaving with a razor can irritate your skin and make it easier to develop an infection. At the time of your surgery:  Speak up if someone tries to shave you with a razor before surgery. Ask why you need to be shaved and talk with your surgeon if you have any concerns. Ask if you will get antibiotics before surgery.   After your surgery:  Make sure that your healthcare providers clean their hands before examining you, either with soap and water or an alcohol-based hand

## 2023-05-10 NOTE — H&P
radiofrequency ablation performed by Mayra Byrne DO at [facility] Bilateral 2/21/2023    medial branch blocks  bilateral Lumbar 4/5, 5/Sacral 1 performed by Mayra Byrne DO at [facility] Bilateral 3/9/2023    medial branch blocks  bilateral Lumbar 4/5 and 5/Sacral 1 performed by Mayra Byrne DO at [facility] Bilateral 3/23/2023    radiofrequency at bilateral Lumbar 4/5, 5/Sacral 1 performed by Mayra Byrne DO at [address] [place] [address] Bilateral     multiple toes amputation       Family History   Problem Relation Age of Onset    Hypertension Mother     Diabetes Mother        Medications & Allergies:   Current Outpatient Medications   Medication Instructions    aspirin 81 mg, Oral, DAILY    atorvastatin (LIPITOR) 20 mg, Oral, DAILY    baclofen (LIORESAL) 20 MG tablet No dose, route, or frequency recorded. glimepiride (AMARYL) 6 mg, Oral, EVERY MORNING    [START ON 5/22/2023] HYDROcodone-acetaminophen (NORCO) 5-325 MG per tablet 1 tablet, Oral, 2 TIMES DAILY PRN    Insulin Glargine-Lixisenatide (SOLIQUA) 100-33 UNT-MCG/ML SOPN SubCUTAneous    lisinopril (PRINIVIL;ZESTRIL) 10 mg, Oral, DAILY    metFORMIN (GLUCOPHAGE) 500 mg, Oral, DAILY WITH BREAKFAST    pregabalin (LYRICA) 75 mg, Oral, 3 TIMES DAILY       No Known Allergies    Review of Systems:   Constitutional: negative for weight changes or fevers  Genitourinary: negative for bowel/bladder incontinence   Musculoskeletal: positive for low back pain and bilateral knee pain  Neurological: negative for any leg weakness or numbness/tingling  Behavioral/Psych: negative for anxiety/depression   All other systems reviewed and are negative    Objective: There were no vitals filed for this visit.     Constitutional: Pleasant, no acute distress   Head: Normocephalic, atraumatic   Eyes: Conjunctivae normal

## 2023-05-11 ENCOUNTER — HOSPITAL ENCOUNTER (OUTPATIENT)
Age: 58
Setting detail: OUTPATIENT SURGERY
Discharge: HOME | End: 2023-05-11
Attending: ANESTHESIOLOGY | Admitting: ANESTHESIOLOGY
Payer: MEDICARE

## 2023-05-11 ENCOUNTER — APPOINTMENT (OUTPATIENT)
Dept: GENERAL RADIOLOGY | Age: 58
End: 2023-05-11
Attending: ANESTHESIOLOGY
Payer: MEDICARE

## 2023-05-11 VITALS
HEART RATE: 72 BPM | HEIGHT: 73 IN | BODY MASS INDEX: 35.78 KG/M2 | SYSTOLIC BLOOD PRESSURE: 145 MMHG | RESPIRATION RATE: 14 BRPM | OXYGEN SATURATION: 98 % | TEMPERATURE: 97 F | DIASTOLIC BLOOD PRESSURE: 85 MMHG | WEIGHT: 270 LBS

## 2023-05-11 PROCEDURE — 3209999900 FLUORO FOR SURGICAL PROCEDURES

## 2023-05-11 PROCEDURE — 2500000003 HC RX 250 WO HCPCS: Performed by: ANESTHESIOLOGY

## 2023-05-11 PROCEDURE — 3600000054 HC PAIN LEVEL 3 BASE: Performed by: ANESTHESIOLOGY

## 2023-05-11 PROCEDURE — 2709999900 HC NON-CHARGEABLE SUPPLY: Performed by: ANESTHESIOLOGY

## 2023-05-11 PROCEDURE — 7100000010 HC PHASE II RECOVERY - FIRST 15 MIN: Performed by: ANESTHESIOLOGY

## 2023-05-11 RX ORDER — LIDOCAINE HYDROCHLORIDE 10 MG/ML
INJECTION, SOLUTION EPIDURAL; INFILTRATION; INTRACAUDAL; PERINEURAL PRN
Status: DISCONTINUED | OUTPATIENT
Start: 2023-05-11 | End: 2023-05-11 | Stop reason: ALTCHOICE

## 2023-05-11 RX ORDER — BUPIVACAINE HYDROCHLORIDE 2.5 MG/ML
INJECTION, SOLUTION EPIDURAL; INFILTRATION; INTRACAUDAL PRN
Status: DISCONTINUED | OUTPATIENT
Start: 2023-05-11 | End: 2023-05-11 | Stop reason: ALTCHOICE

## 2023-05-11 ASSESSMENT — PAIN - FUNCTIONAL ASSESSMENT: PAIN_FUNCTIONAL_ASSESSMENT: 0-10

## 2023-05-11 ASSESSMENT — PAIN SCALES - GENERAL: PAINLEVEL_OUTOF10: 0

## 2023-05-11 NOTE — PROGRESS NOTES
1346-Patient to Phase II. Report received from surgical RN. Patient awake and alert. Vital signs obtained and stable. Respirations unlabored on room air. Patient denies pain and nausea. Denies numbness and tingling in extremities. Injection site open to air and no drainage noted. Patient instructed to stay in bed. Call light in reach.      1400-discharged home in stable condition

## 2023-05-11 NOTE — PROGRESS NOTES
Pt. Admitted in stable condition. Consent signed. VS obtained and WNL. Discharge instructions reviewed with the pt. Pt. Denies questions. AVS given to pt. Pt. Denies all other needs. Warm blanket provided. Call light left within reach.

## 2023-05-11 NOTE — PROCEDURES
Pre-operative Diagnosis: Knee pain     Post-operative Diagnosis: Knee pain     Procedure: LEFT genicular nerve blocks     Procedure Description:  The patient was brought to the procedure room and placed on the exam table in a comfortable supine position. The place for needle placement was obtained by manual palpation with radiographic confirmation. The sterile field was prepared by chloroprep and sterile drapes. Local anesthesia superficial and deep was provided by local infiltration of 1% Lidocaine. Three 3.5 inch spinal needles were advanced to a bony endpoint on the superiolateral portion of the femoral condyle, superiomedial portion of the femoral condyle, and the inferiomedial portion of the tibial condyle until a bony endpoint was met. Lateral x-ray views showed all the needles at 50% depth of the femur and tibia. Then, 0.5 cc of 0.5% bupivacaine was injected after negative aspiration. The needles were withdrawn. The patient tolerated the procedure well. Patient was subsequently monitored in the post procedure setting and discharged in an ambulatory fashion.       Procedural Complications: None  Estimated Blood Loss: 0 mL          Marty Worrell DO  Interventional Pain Management/PM&R   [facility] and [address] [facility]

## 2023-05-18 ENCOUNTER — OFFICE VISIT (OUTPATIENT)
Dept: PHYSICAL MEDICINE AND REHAB | Age: 58
End: 2023-05-18

## 2023-05-18 VITALS
BODY MASS INDEX: 35.78 KG/M2 | DIASTOLIC BLOOD PRESSURE: 88 MMHG | HEIGHT: 73 IN | WEIGHT: 270 LBS | SYSTOLIC BLOOD PRESSURE: 156 MMHG

## 2023-05-18 DIAGNOSIS — M17.12 OSTEOARTHRITIS OF LEFT KNEE, UNSPECIFIED OSTEOARTHRITIS TYPE: ICD-10-CM

## 2023-05-18 DIAGNOSIS — M25.561 CHRONIC PAIN OF RIGHT KNEE: ICD-10-CM

## 2023-05-18 DIAGNOSIS — G89.29 CHRONIC PAIN OF RIGHT KNEE: ICD-10-CM

## 2023-05-18 DIAGNOSIS — M25.562 CHRONIC PAIN OF LEFT KNEE: Primary | ICD-10-CM

## 2023-05-18 DIAGNOSIS — G89.29 CHRONIC PAIN OF LEFT KNEE: Primary | ICD-10-CM

## 2023-05-18 DIAGNOSIS — M48.061 SPINAL STENOSIS OF LUMBAR REGION WITHOUT NEUROGENIC CLAUDICATION: ICD-10-CM

## 2023-05-18 DIAGNOSIS — M47.816 LUMBAR SPONDYLOSIS: ICD-10-CM

## 2023-05-18 DIAGNOSIS — G89.29 OTHER CHRONIC PAIN: ICD-10-CM

## 2023-05-18 NOTE — PROGRESS NOTES
Chronic Pain/PM&R Clinic Note     Encounter Date: 5/18/23    Subjective:   Chief Complaint:   Chief Complaint   Patient presents with    Follow Up After Procedure     Left genicular nerve block  5/11/23       History of Present Illness:   Ramakrishna Sauceda is a 65 y.o. male seen in the clinic initially on 10/20/21 for his history of chronic knee pain. He has a medical history of anxiety/depression and T2DM is s/p multiple toe amputations. He has longstanding history of bilateral knee pain for the last several years. He endorses the majority of pain to be on the medial and lateral aspect of the knees at the joint line. He states that his knee pain is a constant 5/10 aching, stabbing pain. His knee pain is worse with any activities where he is trying to go up and down stairs and really has a difficult time being on his legs for an extended duration of time. He denies any further radiation down his legs. He does have some low back pain was found to have an L1 compression fracture several months ago and states that he did not undergo a kyphoplasty but is wondering how this can be addressed. He describes the majority of his back pain to be axial in nature slightly above his waistline. This pain is described as a constant aching, nagging, stabbing pain that he rates as a constant 6/10 pain. He denies any pain that radiates further down his legs, associated leg weakness, leg paresthesias, saddle anesthesia, bowel/bladder incontinence. Today, in follow-up 5/4/2023, patient presents for planned follow-up on chronic pain. He states his low back pain has been tolerable but he is really struggling with bilateral knee pain. He states the right genicular RFA did help with the severity of his pain but he still does get significant pain in the back of his knee. He did have a meniscal tear in his right knee that was repaired by Dr. Jodi Dunaway. He has a balance at [facility] and is unable to go back until it is paid.  He knows he

## 2023-06-01 ENCOUNTER — PREP FOR PROCEDURE (OUTPATIENT)
Dept: PHYSICAL MEDICINE AND REHAB | Age: 58
End: 2023-06-01

## 2023-06-09 ENCOUNTER — TELEPHONE (OUTPATIENT)
Dept: PHYSICAL MEDICINE AND REHAB | Age: 58
End: 2023-06-09

## 2023-07-06 ENCOUNTER — OFFICE VISIT (OUTPATIENT)
Dept: PHYSICAL MEDICINE AND REHAB | Age: 58
End: 2023-07-06
Payer: MEDICARE

## 2023-07-06 VITALS
BODY MASS INDEX: 35.78 KG/M2 | DIASTOLIC BLOOD PRESSURE: 84 MMHG | SYSTOLIC BLOOD PRESSURE: 164 MMHG | HEIGHT: 73 IN | WEIGHT: 270 LBS

## 2023-07-06 DIAGNOSIS — G89.29 CHRONIC PAIN OF LEFT KNEE: Primary | ICD-10-CM

## 2023-07-06 DIAGNOSIS — G89.29 OTHER CHRONIC PAIN: ICD-10-CM

## 2023-07-06 DIAGNOSIS — M17.12 OSTEOARTHRITIS OF LEFT KNEE, UNSPECIFIED OSTEOARTHRITIS TYPE: ICD-10-CM

## 2023-07-06 DIAGNOSIS — M47.816 LUMBAR SPONDYLOSIS: ICD-10-CM

## 2023-07-06 DIAGNOSIS — M25.562 CHRONIC PAIN OF LEFT KNEE: Primary | ICD-10-CM

## 2023-07-06 PROCEDURE — G8417 CALC BMI ABV UP PARAM F/U: HCPCS | Performed by: ANESTHESIOLOGY

## 2023-07-06 PROCEDURE — 1036F TOBACCO NON-USER: CPT | Performed by: ANESTHESIOLOGY

## 2023-07-06 PROCEDURE — 3077F SYST BP >= 140 MM HG: CPT | Performed by: ANESTHESIOLOGY

## 2023-07-06 PROCEDURE — 3017F COLORECTAL CA SCREEN DOC REV: CPT | Performed by: ANESTHESIOLOGY

## 2023-07-06 PROCEDURE — 99214 OFFICE O/P EST MOD 30 MIN: CPT | Performed by: ANESTHESIOLOGY

## 2023-07-06 PROCEDURE — 3079F DIAST BP 80-89 MM HG: CPT | Performed by: ANESTHESIOLOGY

## 2023-07-06 PROCEDURE — G8427 DOCREV CUR MEDS BY ELIG CLIN: HCPCS | Performed by: ANESTHESIOLOGY

## 2023-07-06 RX ORDER — HYDROCODONE BITARTRATE AND ACETAMINOPHEN 5; 325 MG/1; MG/1
1 TABLET ORAL 2 TIMES DAILY PRN
Qty: 60 TABLET | Refills: 0 | Status: SHIPPED | OUTPATIENT
Start: 2023-07-21 | End: 2023-08-20

## 2023-07-06 NOTE — PROGRESS NOTES
Functionality Assessment/Goals Worksheet     On a scale of 0 (Does not Interfere) to 10 (Completely Interferes)     1. Which number describes how during the past week pain has interfered with           the following:  A. General Activity:  8  B. Mood: 2  C. Walking Ability:  8  D. Normal Work (Includes both work outside the home and housework):  8  E. Relations with Other People:   1  F. Sleep:   4  G. Enjoyment of Life:   6    2. Patient Prefers to Take their Pain Medications:     [x]  On a regular basis   []  Only when necessary    []  Does not take pain medications    3. What are the Patient's Goals/Expectations for Visiting Pain Management?      [x]  Learn about my pain    []  Receive Medication   []  Physical Therapy     []  Treat Depression   []  Receive Injections    []  Treat Sleep   []  Deal with Anxiety and Stress   []  Treat Opoid Dependence/Addiction   []  Other:

## 2023-07-06 NOTE — PROGRESS NOTES
Chronic Pain/PM&R Clinic Note     Encounter Date: 7/6/23    Subjective:   Chief Complaint:   Chief Complaint   Patient presents with    Follow Up After Procedure     Left genicular nerve block 5/11/23       History of Present Illness:   Addison Moe is a 65 y.o. male seen in the clinic initially on 10/20/21 for his history of chronic knee pain. He has a medical history of anxiety/depression and T2DM is s/p multiple toe amputations. He has longstanding history of bilateral knee pain for the last several years. He endorses the majority of pain to be on the medial and lateral aspect of the knees at the joint line. He states that his knee pain is a constant 5/10 aching, stabbing pain. His knee pain is worse with any activities where he is trying to go up and down stairs and really has a difficult time being on his legs for an extended duration of time. He denies any further radiation down his legs. He does have some low back pain was found to have an L1 compression fracture several months ago and states that he did not undergo a kyphoplasty but is wondering how this can be addressed. He describes the majority of his back pain to be axial in nature slightly above his waistline. This pain is described as a constant aching, nagging, stabbing pain that he rates as a constant 6/10 pain. He denies any pain that radiates further down his legs, associated leg weakness, leg paresthesias, saddle anesthesia, bowel/bladder incontinence. Today, 7/6/2023, patient presents for follow-up for management of chronic low back and knee pain. He continues to have ongoing knee pain that continues to lead to significant flareups in his low back pain. He is wondering what the next steps are to manage his pain because he feels like his knee is continuing to be his problem that is exacerbating his other pain conditions.   He states that he has seen the doctors over at [facility] and is okay with seeing the physicians here for a knee

## 2023-07-17 ENCOUNTER — TELEPHONE (OUTPATIENT)
Dept: PHYSICAL MEDICINE AND REHAB | Age: 58
End: 2023-07-17

## 2023-07-17 NOTE — TELEPHONE ENCOUNTER
Armando Moreno is calling to request a referral  for compression fx in his back. Please call patient back when referral has been placed, with details.

## 2023-08-10 ENCOUNTER — OFFICE VISIT (OUTPATIENT)
Dept: NEUROSURGERY | Age: 58
End: 2023-08-10

## 2023-08-10 VITALS
DIASTOLIC BLOOD PRESSURE: 91 MMHG | WEIGHT: 270.06 LBS | BODY MASS INDEX: 35.79 KG/M2 | HEIGHT: 73 IN | SYSTOLIC BLOOD PRESSURE: 172 MMHG | HEART RATE: 90 BPM

## 2023-08-10 DIAGNOSIS — S32.010A COMPRESSION FRACTURE OF L1 VERTEBRA, INITIAL ENCOUNTER (HCC): Primary | ICD-10-CM

## 2023-08-10 PROCEDURE — 3080F DIAST BP >= 90 MM HG: CPT | Performed by: PHYSICIAN ASSISTANT

## 2023-08-10 PROCEDURE — 1036F TOBACCO NON-USER: CPT | Performed by: PHYSICIAN ASSISTANT

## 2023-08-10 PROCEDURE — G8427 DOCREV CUR MEDS BY ELIG CLIN: HCPCS | Performed by: PHYSICIAN ASSISTANT

## 2023-08-10 PROCEDURE — 3077F SYST BP >= 140 MM HG: CPT | Performed by: PHYSICIAN ASSISTANT

## 2023-08-10 PROCEDURE — 3017F COLORECTAL CA SCREEN DOC REV: CPT | Performed by: PHYSICIAN ASSISTANT

## 2023-08-10 PROCEDURE — G8417 CALC BMI ABV UP PARAM F/U: HCPCS | Performed by: PHYSICIAN ASSISTANT

## 2023-08-10 RX ORDER — TADALAFIL 5 MG/1
5 TABLET ORAL PRN
COMMUNITY
Start: 2023-06-02

## 2023-08-10 RX ORDER — DAPAGLIFLOZIN 10 MG/1
10 TABLET, FILM COATED ORAL DAILY
COMMUNITY
Start: 2023-07-10

## 2023-08-10 ASSESSMENT — ENCOUNTER SYMPTOMS
BACK PAIN: 1
CHEST TIGHTNESS: 0
APNEA: 0
SHORTNESS OF BREATH: 0

## 2023-08-17 DIAGNOSIS — S32.010A COMPRESSION FRACTURE OF L1 VERTEBRA, INITIAL ENCOUNTER (HCC): ICD-10-CM

## 2023-08-22 DIAGNOSIS — M47.816 LUMBAR SPONDYLOSIS: ICD-10-CM

## 2023-08-22 DIAGNOSIS — G89.29 OTHER CHRONIC PAIN: ICD-10-CM

## 2023-08-22 RX ORDER — HYDROCODONE BITARTRATE AND ACETAMINOPHEN 5; 325 MG/1; MG/1
1 TABLET ORAL 2 TIMES DAILY PRN
Qty: 60 TABLET | Refills: 0 | Status: SHIPPED | OUTPATIENT
Start: 2023-08-22 | End: 2023-09-21

## 2023-08-22 NOTE — TELEPHONE ENCOUNTER
OARRS reviewed. UDS: no data  . Last seen: 7/6/2023.  Follow-up:   Future Appointments   Date Time Provider [address]   8/28/2023  9[facility]   10/19/2023 10:40 AM DO ALBERTINA Ruiz SRPX [facility]

## 2023-08-26 ENCOUNTER — HOSPITAL ENCOUNTER (OUTPATIENT)
Dept: CT IMAGING | Age: 58
Discharge: HOME | End: 2023-08-26
Attending: RADIOLOGY

## 2023-08-26 ENCOUNTER — HOSPITAL ENCOUNTER (OUTPATIENT)
Dept: MRI IMAGING | Age: 58
Discharge: HOME | End: 2023-08-26
Attending: RADIOLOGY

## 2023-08-26 DIAGNOSIS — Z00.6 EXAMINATION FOR NORMAL COMPARISON FOR CLINICAL RESEARCH: ICD-10-CM

## 2023-08-28 ENCOUNTER — OFFICE VISIT (OUTPATIENT)
Dept: NEUROSURGERY | Age: 58
End: 2023-08-28
Payer: MEDICARE

## 2023-08-28 VITALS
HEIGHT: 73 IN | BODY MASS INDEX: 35.79 KG/M2 | DIASTOLIC BLOOD PRESSURE: 91 MMHG | WEIGHT: 270.06 LBS | HEART RATE: 83 BPM | SYSTOLIC BLOOD PRESSURE: 151 MMHG

## 2023-08-28 DIAGNOSIS — S32.010A COMPRESSION FRACTURE OF L1 VERTEBRA, INITIAL ENCOUNTER (HCC): ICD-10-CM

## 2023-08-28 DIAGNOSIS — S32.010A COMPRESSION FRACTURE OF L1 VERTEBRA, INITIAL ENCOUNTER (HCC): Primary | ICD-10-CM

## 2023-08-28 DIAGNOSIS — M51.9 LUMBAR DISC DISEASE: ICD-10-CM

## 2023-08-28 DIAGNOSIS — M41.55 OTHER SECONDARY SCOLIOSIS, THORACOLUMBAR REGION: ICD-10-CM

## 2023-08-28 PROCEDURE — 99214 OFFICE O/P EST MOD 30 MIN: CPT | Performed by: PHYSICIAN ASSISTANT

## 2023-08-28 PROCEDURE — G8417 CALC BMI ABV UP PARAM F/U: HCPCS | Performed by: PHYSICIAN ASSISTANT

## 2023-08-28 PROCEDURE — 1036F TOBACCO NON-USER: CPT | Performed by: PHYSICIAN ASSISTANT

## 2023-08-28 PROCEDURE — 3017F COLORECTAL CA SCREEN DOC REV: CPT | Performed by: PHYSICIAN ASSISTANT

## 2023-08-28 PROCEDURE — 3077F SYST BP >= 140 MM HG: CPT | Performed by: PHYSICIAN ASSISTANT

## 2023-08-28 PROCEDURE — 3080F DIAST BP >= 90 MM HG: CPT | Performed by: PHYSICIAN ASSISTANT

## 2023-08-28 PROCEDURE — G8427 DOCREV CUR MEDS BY ELIG CLIN: HCPCS | Performed by: PHYSICIAN ASSISTANT

## 2023-08-28 NOTE — PROGRESS NOTES
[address] [facility] [place] [address]  Dept: [phone]  Dept Fax: [phone]  Loc: [phone]    Follow up Visit  Visit Date: 8/28/2023      Landon Covarrubias  is a 65 y.o. male who is returning to the office today for a follow-up visit to address symptoms consistent with possible compression deformity fracture. He was most recently seen and evaluated in our office setting on 8/10/2023 with imaging dating to 2021, no longer considered diagnostic. An updated MRI was ordered for today's visit along with a CT scan. He arrives today unaccompanied and ambulating without assistance but was noticeably uncomfortable with continuing back pain. We reviewed the latest CT scan and MRIs and initiated discussions involving surgical intervention primarily in the form of T10 to pelvis decompression and instrumented fusion with possible interbody implants and possible extension to additional levels as indicated. We reviewed that procedure in detail along with expectation for recovery and the associated risks which include, but not limited to; bleeding, infection, anesthetic complication, neurologic injury, incomplete relief of symptoms, the need for future fusion and even death. Prior to consideration consent and scheduling a discogram/discography is ordered to ascertain discogenic component secondary to abnormal findings on the CT scan that are significant for vacuum phenomenon at multiple levels. An order for discography will be processed at today's appointment along with a renewal for evaluation by pain management Dr. Bronwyn Blackmon for injection therapy with a follow-up with our service in 4 weeks, to include Dr. Mian Franco, to review and update surgical planning. Patient was evaluated today and is doing marginally overall. No new complaints were voiced.   Patient  lives alone  Wound: none  Follow-up Studies: No orders of

## 2023-09-20 ENCOUNTER — TELEPHONE (OUTPATIENT)
Dept: PHYSICAL MEDICINE AND REHAB | Age: 58
End: 2023-09-20

## 2023-09-20 DIAGNOSIS — M47.816 LUMBAR SPONDYLOSIS: ICD-10-CM

## 2023-09-20 DIAGNOSIS — G89.29 OTHER CHRONIC PAIN: ICD-10-CM

## 2023-09-20 RX ORDER — HYDROCODONE BITARTRATE AND ACETAMINOPHEN 5; 325 MG/1; MG/1
1 TABLET ORAL 2 TIMES DAILY PRN
Qty: 60 TABLET | Refills: 0 | Status: SHIPPED | OUTPATIENT
Start: 2023-09-21 | End: 2023-10-21

## 2023-09-20 NOTE — TELEPHONE ENCOUNTER
[address] wanted to pass on to make sure your aware that pt. In the past has had alcohol addiction, since have pt.  On norco.

## 2023-09-20 NOTE — TELEPHONE ENCOUNTER
Jayden Rubio called requesting a refill on the following medications:  Requested Prescriptions     Pending Prescriptions Disp Refills    HYDROcodone-acetaminophen (NORCO) 5-325 MG per tablet 60 tablet 0     Sig: Take 1 tablet by mouth 2 times daily as needed for Pain for up to 30 days. Pharmacy verified: [facility]  . pv      Date of last visit: 7/6/2023  Date of next visit (if applicable): 27/10/8781

## 2023-10-18 DIAGNOSIS — M47.816 LUMBAR SPONDYLOSIS: ICD-10-CM

## 2023-10-18 DIAGNOSIS — G89.29 OTHER CHRONIC PAIN: ICD-10-CM

## 2023-10-18 RX ORDER — HYDROCODONE BITARTRATE AND ACETAMINOPHEN 5; 325 MG/1; MG/1
1 TABLET ORAL 2 TIMES DAILY PRN
Qty: 60 TABLET | Refills: 0 | OUTPATIENT
Start: 2023-10-21 | End: 2023-11-20

## 2023-10-18 NOTE — TELEPHONE ENCOUNTER
Called pt to let him know that he can't get refill until next appt in November since he didn't come in last appt scheduled. Pt asked he called in to reschedule due to him being called into work that day and couldn't find anyone to cover his shift. Reiterated back to pt that he can't get refill until 11/27/23 appt with Dr Burke Campos. Pt verbalized understanding.

## 2023-10-18 NOTE — TELEPHONE ENCOUNTER
Matthew Watkins called requesting a refill on the following medications:  Requested Prescriptions     Pending Prescriptions Disp Refills    HYDROcodone-acetaminophen (NORCO) 5-325 MG per tablet 60 tablet 0     Sig: Take 1 tablet by mouth 2 times daily as needed for Pain for up to 30 days.      [facility]      Date of last visit: 07-17-23  Date of next visit (if applicable): 05/51/7830

## 2023-10-18 NOTE — TELEPHONE ENCOUNTER
Patient missed f/u appointment and has a track record for non-compliance. No refills on meds and do NOT move patient's appt sooner. Will discuss at f/u.       Ketan Bae,   Interventional Pain Management/PM&R   [address]

## 2023-10-18 NOTE — TELEPHONE ENCOUNTER
OARRS reviewed. UDS: + for  no data. Last seen: 7/6/2023.  Follow-up:   Future Appointments   Date Time Provider [address]   11/27/2023 10:40 AM Marty Worrell, DO ENG SRPX Pain [facility]

## 2023-11-29 ENCOUNTER — CLINICAL DOCUMENTATION (OUTPATIENT)
Dept: PHYSICAL MEDICINE AND REHAB | Age: 58
End: 2023-11-29

## 2023-11-29 NOTE — PROGRESS NOTES
Due to multiple no-shows and same-day cancellations, patient will be dismissed from the practice immediately. This decision is final and patient will be notified via letter in the mail of our decision.       Lena Naylor DO  Interventional Pain Management/PM&R   [address]

## 2023-12-27 DIAGNOSIS — S32.010A COMPRESSION FRACTURE OF L1 VERTEBRA, INITIAL ENCOUNTER (HCC): Primary | ICD-10-CM

## 2024-01-10 ENCOUNTER — HOSPITAL ENCOUNTER (OUTPATIENT)
Dept: INTERVENTIONAL RADIOLOGY/VASCULAR | Age: 59
Discharge: HOME | End: 2024-01-10
Payer: MEDICARE

## 2024-01-10 VITALS
HEART RATE: 69 BPM | RESPIRATION RATE: 18 BRPM | OXYGEN SATURATION: 98 % | SYSTOLIC BLOOD PRESSURE: 181 MMHG | DIASTOLIC BLOOD PRESSURE: 95 MMHG

## 2024-01-10 DIAGNOSIS — M51.9 LUMBAR DISC DISEASE: ICD-10-CM

## 2024-01-10 DIAGNOSIS — M41.55 OTHER SECONDARY SCOLIOSIS, THORACOLUMBAR REGION: ICD-10-CM

## 2024-01-10 PROCEDURE — 6360000002 HC RX W HCPCS: Performed by: RADIOLOGY

## 2024-01-10 PROCEDURE — 2580000003 HC RX 258: Performed by: RADIOLOGY

## 2024-01-10 RX ORDER — SODIUM CHLORIDE 450 MG/100ML
INJECTION, SOLUTION INTRAVENOUS CONTINUOUS
Status: DISCONTINUED | OUTPATIENT
Start: 2024-01-10 | End: 2024-01-11 | Stop reason: HOSPADM

## 2024-01-10 RX ADMIN — Medication 2000 MG: at 11:58

## 2024-01-10 RX ADMIN — SODIUM CHLORIDE: 4.5 INJECTION, SOLUTION INTRAVENOUS at 11:55

## 2024-01-10 ASSESSMENT — PAIN SCALES - GENERAL: PAINLEVEL_OUTOF10: 1

## 2024-01-10 ASSESSMENT — PAIN DESCRIPTION - LOCATION: LOCATION: BACK

## 2024-01-10 NOTE — PROGRESS NOTES
1130  Guilherme ambulated into room with son for discogram. Pt rights and responsibilities offered to pt. He states he does not take any blood thinners. He has been NPO for over 4 hours. No labs were ordered. Iv started and Guilherme has call light within reach. Son at bedside. Guilherme is alert and oriented. No other needs at this time.     1250   Guilherme was picked up for procedure    1350 Guilherme returned from procedure.          D/c instructions explained and Guilherme verbalized understanding. Guilherme ambulated out with son for d/c today.       _m___ Safety:       (Environmental)  [place] to environment  Ensure ID band is correct and in place/ allergy band as needed  Assess for fall risk  Initiate fall precautions as applicable (fall band, side rails, etc.)  Call light within reach  Bed in low position/ wheels locked    _m___ Pain:       Assess pain level and characteristics  Administer analgesics as ordered  Assess effectiveness of pain management and report to MD as needed    _m___ Knowledge Deficit:  Assess baseline knowledge  Provide teaching at level of understanding  Provide teaching via preferred learning method  Evaluate teaching effectiveness    __m__ Hemodynamic/Respiratory Status:       (Pre and Post Procedure Monitoring)  Assess/Monitor vital signs and LOC  Assess Baseline SpO2 prior to any sedation  Obtain weight/height  Assess vital signs/ LOC until patient meets discharge criteria  Monitor procedure site and notify MD of any issues

## 2024-01-10 NOTE — PROGRESS NOTES
1255 Pt in specials radiology for discogram. Discussed procedure with pt and pt verbalizes understanding.   1302 Dr Davey to speak to pt.  1324 Pt positioned prone on table.  1335 Pt screaming and kicking legs. Unable to lie still.  1336 Pt cont to scream with needle insertion. Procedure stopped.  1337 Band-aid applied to site on lower back. Site without redness, swelling or hematoma.  1342 Pt positioned on cart for comfort. Report called to OPN Randi.  1344 Pt transferred to OPN per cart.

## 2024-01-10 NOTE — DISCHARGE INSTRUCTIONS
NERVE BLOCK DISCHARGE INSTRUCTION    1.  Take it easy and rest the remainder of the day.  2.  Do not drive for the remainder of the day.  3.  You may use ice on the area of the injection to help alleviate discomfort.  Avoid heat for the remainder of the day.  4.  Do not soak in water or use a tub bath or hot tub for the remainder of the day.  5.  You may resume normal eating and drinking.  6. This evening you may resume your pain medications and any other medications you had stopped prior to the injection.  7.  Resume activities starting tomorrow in gradual manner.  You may resume physical therapy.  8. Follow up with ordering doctor if you continue to have pain.           9.  If you do have another nerve block ordered, follow instructions below:  Bring someone to drive you home.  Nothing to eat or drink 2 hours prior.  No blood thinners or aspirin products 5 days prior.  10.  Notify Radiology ([phone]), or go to the emergency room immediately if you develop any of the following symptoms: fever, chills, changes in mental status, severe pain, difficulty breathing, prolonged, severe headache, numbness or weakness in your arms or legs, loss of control of your bladder or bowel, excessive redness, swelling, or drainage from the area of injection.

## 2024-01-22 ENCOUNTER — OFFICE VISIT (OUTPATIENT)
Dept: NEUROSURGERY | Age: 59
End: 2024-01-22
Payer: MEDICARE

## 2024-01-22 VITALS
HEIGHT: 73 IN | WEIGHT: 270 LBS | SYSTOLIC BLOOD PRESSURE: 180 MMHG | BODY MASS INDEX: 35.78 KG/M2 | DIASTOLIC BLOOD PRESSURE: 90 MMHG

## 2024-01-22 DIAGNOSIS — S32.010A COMPRESSION FRACTURE OF L1 VERTEBRA, INITIAL ENCOUNTER (HCC): Primary | ICD-10-CM

## 2024-01-22 DIAGNOSIS — M41.55 OTHER SECONDARY SCOLIOSIS, THORACOLUMBAR REGION: ICD-10-CM

## 2024-01-22 DIAGNOSIS — M51.9 LUMBAR DISC DISEASE: ICD-10-CM

## 2024-01-22 PROCEDURE — G8417 CALC BMI ABV UP PARAM F/U: HCPCS | Performed by: PHYSICIAN ASSISTANT

## 2024-01-22 PROCEDURE — 3080F DIAST BP >= 90 MM HG: CPT | Performed by: PHYSICIAN ASSISTANT

## 2024-01-22 PROCEDURE — 99214 OFFICE O/P EST MOD 30 MIN: CPT | Performed by: PHYSICIAN ASSISTANT

## 2024-01-22 PROCEDURE — 3017F COLORECTAL CA SCREEN DOC REV: CPT | Performed by: PHYSICIAN ASSISTANT

## 2024-01-22 PROCEDURE — G8484 FLU IMMUNIZE NO ADMIN: HCPCS | Performed by: PHYSICIAN ASSISTANT

## 2024-01-22 PROCEDURE — 1036F TOBACCO NON-USER: CPT | Performed by: PHYSICIAN ASSISTANT

## 2024-01-22 PROCEDURE — 3077F SYST BP >= 140 MM HG: CPT | Performed by: PHYSICIAN ASSISTANT

## 2024-01-22 PROCEDURE — G8427 DOCREV CUR MEDS BY ELIG CLIN: HCPCS | Performed by: PHYSICIAN ASSISTANT

## 2024-01-22 NOTE — PROGRESS NOTES
[facility] [place] [facility]  [facility] NEUROSURGERY  [address]  [facility] [address]  Dept: [phone]  Dept Fax: [phone]  Loc: [phone]    Post Op Follow Visit  Visit Date: 1/22/2024      Guilherme  is a 58 y.o. male who is returning to the office today for a post-op follow-up visit for continuing evaluation of back pain and is a preoperative appointment for surgical planning.  He was most recently seen and evaluated in our office setting on 8/28/2023.  Patient was unable to tolerate discogram due to the level of discomfort and has been discharged from [facility] pain management for noncompliance.  He arrives today accompanied by his son and ambulating without assistance having recovered from recent knee replacement surgery.  In November he underwent total knee replacement for the left knee with the right knee scheduled in February.  We discussed and reviewed surgical options while simultaneously reviewing the latest MRI of the lumbar spine.  We renewed discussions involving surgical intervention which we feel is reasonable in the form of posterior lumbar decompression and instrumented fusion from thoracic 10 including the pelvis.  We discussed the procedure in detail along with expectations for recovery and the associated risks.  Prior to consideration, consent or scheduling he is adamant that he would like to undergo and recover from right total knee replacement surgery.  Therefore, we have agreed to follow-up with him in approximately 2 months with a new referral processed for a pain management specialist at [facility] in an attempt to provide some relief from ongoing back pain.  He is in agreement with this plan moving forward and is encouraged to reach out to our service with any additional questions or concerns.     Patient was evaluated today and is doing marginally overall.  No new complaints were voiced.  Patient  lives with their family  Wound:  no

## 2024-05-15 ENCOUNTER — OFFICE VISIT (OUTPATIENT)
Dept: NEUROSURGERY | Age: 59
End: 2024-05-15

## 2024-05-15 VITALS
HEIGHT: 73 IN | HEART RATE: 86 BPM | WEIGHT: 270 LBS | SYSTOLIC BLOOD PRESSURE: 157 MMHG | BODY MASS INDEX: 35.78 KG/M2 | DIASTOLIC BLOOD PRESSURE: 102 MMHG

## 2024-05-15 DIAGNOSIS — M51.9 LUMBAR DISC DISEASE: ICD-10-CM

## 2024-05-15 DIAGNOSIS — S32.010A COMPRESSION FRACTURE OF L1 VERTEBRA, INITIAL ENCOUNTER (HCC): Primary | ICD-10-CM

## 2024-05-15 DIAGNOSIS — M41.55 OTHER SECONDARY SCOLIOSIS, THORACOLUMBAR REGION: ICD-10-CM

## 2024-05-15 RX ORDER — ETODOLAC 200 MG/1
200 CAPSULE ORAL EVERY 8 HOURS
COMMUNITY

## 2024-05-15 RX ORDER — METFORMIN HYDROCHLORIDE 500 MG/1
500 TABLET, EXTENDED RELEASE ORAL 2 TIMES DAILY
COMMUNITY
Start: 2024-05-07

## 2024-05-15 NOTE — PROGRESS NOTES
[facility] [place] [facility]  [facility] NEUROSURGERY  [address]  [facility] [address]  Dept: [phone]  Dept Fax: [phone]  Loc: [phone]    Follow-up visit  Visit Date: 5/15/2024      Guilherme  is a 58 y.o. male who is returning to the office today for a follow-up visit for continuing evaluation of back pain.  He was most recently seen and evaluated in our office setting on January 22, 2024 where he was accompanied by his son and ambulating without assistance having recently recovered from his knee replacement surgery.  Total right knee replacement was scheduled for February.  We discussed and reviewed surgical options principally in the form of posterior lumbar decompression and instrumented fusion from thoracic 10 to the pelvis.    We described the procedure in detail along with expectations for recovery and the associated risks but prior to consideration, consent or scheduling he was adamant that he would like to first proceed scheduled knee replacement surgery.  Today's follow-up is to renew discussions and following consideration, consent and scheduling of the aforementioned procedure.  He arrives today with the most recent imaging dating to August 2023.               He arrives today accompanied by his wife and ambulating without assistance.  He relates having been seen and treated by Stephanie osborne/pain management with diminishing returns.  Additionally, he relates having delayed consideration and scheduling of the anticipated right total knee stating that the back pain and worsened to the point where his preferential order was to address the back pain with surgical intervention prior to the knee surgery.    We renewed discussions involving surgical intervention in the form of posterior lumbar decompression and instrumented fusion from thoracic 10 to the pelvis with possible interbody implants.  We described the procedure in detail along with expectation for

## 2024-06-03 ENCOUNTER — TELEPHONE (OUTPATIENT)
Dept: NEUROSURGERY | Age: 59
End: 2024-06-03

## 2024-06-03 ENCOUNTER — HOSPITAL ENCOUNTER (OUTPATIENT)
Dept: CT IMAGING | Age: 59
Discharge: HOME | End: 2024-06-03
Payer: MEDICARE

## 2024-06-03 ENCOUNTER — HOSPITAL ENCOUNTER (OUTPATIENT)
Dept: MRI IMAGING | Age: 59
Discharge: HOME | End: 2024-06-03
Payer: MEDICARE

## 2024-06-03 DIAGNOSIS — M41.55 OTHER SECONDARY SCOLIOSIS, THORACOLUMBAR REGION: ICD-10-CM

## 2024-06-03 DIAGNOSIS — M51.9 LUMBAR DISC DISEASE: ICD-10-CM

## 2024-06-03 PROCEDURE — 72131 CT LUMBAR SPINE W/O DYE: CPT

## 2024-06-03 PROCEDURE — 72148 MRI LUMBAR SPINE W/O DYE: CPT

## 2024-06-03 NOTE — TELEPHONE ENCOUNTER
Patient called the office and stated he has been seen twice by Stephanie Richardson CNP with pain management. He stated the first visit they gave him a back brace and the second visit nothing helped prior but she would not prescribe pain medication.  Patient stated he was having a CT and MRI completed today and asked if there was anything we could do for pain in the interim.   Informed patient to contact pain management or PCP for pain control until surgery.

## 2024-07-29 ENCOUNTER — OFFICE VISIT (OUTPATIENT)
Dept: NEUROSURGERY | Age: 59
End: 2024-07-29
Payer: MEDICARE

## 2024-07-29 VITALS
BODY MASS INDEX: 35.78 KG/M2 | WEIGHT: 270 LBS | HEART RATE: 112 BPM | HEIGHT: 73 IN | SYSTOLIC BLOOD PRESSURE: 184 MMHG | DIASTOLIC BLOOD PRESSURE: 100 MMHG

## 2024-07-29 DIAGNOSIS — S32.010A COMPRESSION FRACTURE OF L1 VERTEBRA, INITIAL ENCOUNTER (HCC): Primary | ICD-10-CM

## 2024-07-29 DIAGNOSIS — M51.9 LUMBAR DISC DISEASE: ICD-10-CM

## 2024-07-29 DIAGNOSIS — M41.55 OTHER SECONDARY SCOLIOSIS, THORACOLUMBAR REGION: ICD-10-CM

## 2024-07-29 PROCEDURE — 3080F DIAST BP >= 90 MM HG: CPT | Performed by: PHYSICIAN ASSISTANT

## 2024-07-29 PROCEDURE — 1036F TOBACCO NON-USER: CPT | Performed by: PHYSICIAN ASSISTANT

## 2024-07-29 PROCEDURE — G8417 CALC BMI ABV UP PARAM F/U: HCPCS | Performed by: PHYSICIAN ASSISTANT

## 2024-07-29 PROCEDURE — 99214 OFFICE O/P EST MOD 30 MIN: CPT | Performed by: PHYSICIAN ASSISTANT

## 2024-07-29 PROCEDURE — G8427 DOCREV CUR MEDS BY ELIG CLIN: HCPCS | Performed by: PHYSICIAN ASSISTANT

## 2024-07-29 PROCEDURE — 3077F SYST BP >= 140 MM HG: CPT | Performed by: PHYSICIAN ASSISTANT

## 2024-07-29 PROCEDURE — 3017F COLORECTAL CA SCREEN DOC REV: CPT | Performed by: PHYSICIAN ASSISTANT

## 2024-07-29 NOTE — PROGRESS NOTES
[facility] [place] [facility]  [facility] NEUROSURGERY  [address]  [facility] [address]  Dept: [phone]  Dept Fax: [phone]  Loc: [phone]    Follow-up Visit  Visit Date: 7/29/2024      Guilherme  is a 58 y.o. male who is returning to the office today for a follow-up visit to address radiating low back pain and to revisit discussions involving surgical options including posterior lumbar decompression and instrumented fusion from thoracic 10 to the pelvis.    He was most recently seen and evaluated in our office setting on 5/15/2024 where we described the above procedure in detail along with expectation for recovery and the associated risks associated with the procedure.  Prior to consideration, consent or scheduling he was adamant that he would like to first proceed with scheduled knee replacement surgery.  He arrived to that appointment accompanied by his wife and ambulating without assistance and following renewed discussions he opted to proceed with the surgical option with a consent offered for his signature and the surgery to be scheduled at [facility] at the earliest convenience.  A new CT scan and new MRI were ordered in advance of scheduling.            He arrives today unaccompanied and ambulating without assistance with complaints of worsening back pain, awakening him from sleep.  He is anxious and amicable to moving forward with the above described procedure.  We revisited those discussions, which included Dr. Herrera/neurosurgeon, and have agreed that the posterior lumbar decompression and instrumented fusion of T10 to the pelvis is reasonable including possible interbody implants and possible extension to any additional levels as indicated.  The surgery will be scheduled at the earliest convenience based on the OR availability with our service reaching out to him in the next week to provide additional dates for his approval.  In the interim he is

## 2024-07-31 ENCOUNTER — TELEPHONE (OUTPATIENT)
Dept: NEUROSURGERY | Age: 59
End: 2024-07-31

## 2024-07-31 NOTE — TELEPHONE ENCOUNTER
SURGERY SCHEDULING FORM   [place] [address]      Phone *[phone] *[phone]   Surgical Scheduling Direct Line Phone *[phone] Fax *[phone]      Guilherme Alexander   1965   male    [address] [facility] [address]              Home Phone: [phone]    Cell Phone:    Telephone Information:   Mobile [phone]          Surgeon: Dr. Herrera   Surgery Date: 09/05/2024  Time: 7:30 am    Procedure: Posterior lumbar decompression and instrumented fusion of T10-Pelvis plus interbody implants     Diagnosis:  Compression fracture of L1 vertebra, Lumbar disc disease    Important Medical History:       Special Inst/Equip: Position: Prone, Cell Saver, Apollo Table, Operating Microscope, Neuropsyology, Neuromonitoring    Anesthesia:  Gen            Admission Type:  Observation      Case Location:      Main OR         Need Preop Cardiac Clearance:       Does Patient have Cardiologist/physician?         Surgery Confirmation #: __________________________________________________    : ________________________   Date: __________________________     Insurance Company Name: Medicare Part A and B  CPT CODES: 63047 x3, 63048 x2, [id number], [id number], [id number], [id number]

## (undated) DEVICE — 1840 FOAM BLOCK NEEDLE COUNTER: Brand: DEVON

## (undated) DEVICE — NEEDLE SPNL 22GA L3.5IN BLK HUB S STL REG WALL FIT STYL W/

## (undated) DEVICE — GLOVE BIOGEL POWDER FREE SZ 8

## (undated) DEVICE — HYPODERMIC SAFETY NEEDLE: Brand: MAGELLAN

## (undated) DEVICE — MARKER,SKIN,WI/RULER AND LABELS: Brand: MEDLINE

## (undated) DEVICE — TOWEL,OR,DSP,ST,BLUE,STD,4/PK,20PK/CS: Brand: MEDLINE

## (undated) DEVICE — SYRINGE MED 3ML CLR PLAS STD N CTRL LUERLOCK TIP DISP

## (undated) DEVICE — 6 ML SYRINGE LUER-LOCK TIP: Brand: MONOJECT

## (undated) DEVICE — NEEDLE HYPO 18GA L1.5IN THN WALL PIVOTING SHLD BVL ORIENTED

## (undated) DEVICE — GAUZE SPONGES,USP TYPE VII GAUZE, 12 PLY: Brand: CURITY

## (undated) DEVICE — 3 ML SYRINGE LUER-LOCK TIP: Brand: MONOJECT

## (undated) DEVICE — SYRINGE MED 10ML LUERLOCK TIP W/O SFTY DISP

## (undated) DEVICE — SHEETS DRAW

## (undated) DEVICE — APPLICATOR MEDICATED 3 CC SOLUTION CLR STRL CHLORAPREP